# Patient Record
Sex: FEMALE | Race: WHITE | Employment: FULL TIME | URBAN - NONMETROPOLITAN AREA
[De-identification: names, ages, dates, MRNs, and addresses within clinical notes are randomized per-mention and may not be internally consistent; named-entity substitution may affect disease eponyms.]

---

## 2017-01-20 ENCOUNTER — OFFICE VISIT (OUTPATIENT)
Dept: FAMILY MEDICINE CLINIC | Age: 52
End: 2017-01-20

## 2017-01-20 VITALS
SYSTOLIC BLOOD PRESSURE: 128 MMHG | WEIGHT: 185 LBS | HEIGHT: 66 IN | DIASTOLIC BLOOD PRESSURE: 74 MMHG | BODY MASS INDEX: 29.73 KG/M2 | HEART RATE: 72 BPM

## 2017-01-20 DIAGNOSIS — K21.9 GASTROESOPHAGEAL REFLUX DISEASE WITHOUT ESOPHAGITIS: ICD-10-CM

## 2017-01-20 DIAGNOSIS — M05.9 RHEUMATOID ARTHRITIS WITH POSITIVE RHEUMATOID FACTOR, INVOLVING UNSPECIFIED SITE (HCC): ICD-10-CM

## 2017-01-20 DIAGNOSIS — E78.5 HYPERLIPIDEMIA, UNSPECIFIED HYPERLIPIDEMIA TYPE: ICD-10-CM

## 2017-01-20 DIAGNOSIS — Z72.0 TOBACCO ABUSE: Primary | ICD-10-CM

## 2017-01-20 DIAGNOSIS — R60.1 GENERALIZED EDEMA: ICD-10-CM

## 2017-01-20 PROCEDURE — 99214 OFFICE O/P EST MOD 30 MIN: CPT | Performed by: FAMILY MEDICINE

## 2017-01-20 RX ORDER — ATORVASTATIN CALCIUM 20 MG/1
TABLET, FILM COATED ORAL
Qty: 90 TABLET | Refills: 1 | Status: SHIPPED | OUTPATIENT
Start: 2017-01-20 | End: 2017-05-26 | Stop reason: SDUPTHER

## 2017-01-20 RX ORDER — FOLIC ACID 1 MG/1
1 TABLET ORAL DAILY
COMMUNITY
End: 2022-08-11

## 2017-01-20 RX ORDER — PAROXETINE HYDROCHLORIDE 20 MG/1
20 TABLET, FILM COATED ORAL DAILY
Qty: 30 TABLET | Refills: 3 | Status: SHIPPED | OUTPATIENT
Start: 2017-01-20 | End: 2017-05-26 | Stop reason: SDUPTHER

## 2017-01-20 RX ORDER — ESOMEPRAZOLE MAGNESIUM 40 MG/1
40 CAPSULE, DELAYED RELEASE ORAL
Qty: 90 CAPSULE | Refills: 1 | Status: SHIPPED | OUTPATIENT
Start: 2017-01-20 | End: 2017-05-26 | Stop reason: SDUPTHER

## 2017-01-20 RX ORDER — FUROSEMIDE 20 MG/1
20 TABLET ORAL DAILY
Qty: 20 TABLET | Refills: 3 | Status: SHIPPED | OUTPATIENT
Start: 2017-01-20 | End: 2017-05-26 | Stop reason: SDUPTHER

## 2017-01-20 ASSESSMENT — ENCOUNTER SYMPTOMS
EYES NEGATIVE: 1
RESPIRATORY NEGATIVE: 1
GASTROINTESTINAL NEGATIVE: 1
ALLERGIC/IMMUNOLOGIC NEGATIVE: 1

## 2017-04-30 ENCOUNTER — OFFICE VISIT (OUTPATIENT)
Dept: PRIMARY CARE CLINIC | Age: 52
End: 2017-04-30
Payer: COMMERCIAL

## 2017-04-30 VITALS
HEART RATE: 74 BPM | OXYGEN SATURATION: 98 % | WEIGHT: 178 LBS | TEMPERATURE: 98.3 F | HEIGHT: 64 IN | SYSTOLIC BLOOD PRESSURE: 126 MMHG | BODY MASS INDEX: 30.39 KG/M2 | DIASTOLIC BLOOD PRESSURE: 70 MMHG

## 2017-04-30 DIAGNOSIS — J40 BRONCHITIS: Primary | ICD-10-CM

## 2017-04-30 PROCEDURE — 99213 OFFICE O/P EST LOW 20 MIN: CPT | Performed by: FAMILY MEDICINE

## 2017-04-30 RX ORDER — PREDNISONE 20 MG/1
40 TABLET ORAL DAILY
Qty: 10 TABLET | Refills: 0 | Status: SHIPPED | OUTPATIENT
Start: 2017-04-30 | End: 2017-05-05

## 2017-04-30 RX ORDER — AZITHROMYCIN 250 MG/1
TABLET, FILM COATED ORAL
Qty: 1 PACKET | Refills: 1 | Status: SHIPPED | OUTPATIENT
Start: 2017-04-30 | End: 2017-08-10 | Stop reason: ALTCHOICE

## 2017-04-30 RX ORDER — ALBUTEROL SULFATE 90 UG/1
2 AEROSOL, METERED RESPIRATORY (INHALATION) EVERY 4 HOURS PRN
Qty: 1 INHALER | Refills: 0 | Status: SHIPPED | OUTPATIENT
Start: 2017-04-30 | End: 2019-07-05 | Stop reason: SDUPTHER

## 2017-04-30 ASSESSMENT — ENCOUNTER SYMPTOMS
SINUS PRESSURE: 1
CONSTIPATION: 0
NAUSEA: 0
SORE THROAT: 0
WHEEZING: 1
SHORTNESS OF BREATH: 1
DIARRHEA: 0
CHOKING: 0
CHEST TIGHTNESS: 0
COUGH: 1
TROUBLE SWALLOWING: 0

## 2017-05-26 DIAGNOSIS — R60.1 GENERALIZED EDEMA: ICD-10-CM

## 2017-05-26 DIAGNOSIS — E78.5 HYPERLIPIDEMIA, UNSPECIFIED HYPERLIPIDEMIA TYPE: ICD-10-CM

## 2017-05-26 RX ORDER — FUROSEMIDE 20 MG/1
20 TABLET ORAL DAILY PRN
Qty: 90 TABLET | Refills: 0 | Status: SHIPPED | OUTPATIENT
Start: 2017-05-26 | End: 2017-08-10 | Stop reason: SDUPTHER

## 2017-05-26 RX ORDER — ATORVASTATIN CALCIUM 20 MG/1
20 TABLET, FILM COATED ORAL DAILY
Qty: 90 TABLET | Refills: 0 | Status: SHIPPED | OUTPATIENT
Start: 2017-05-26 | End: 2017-08-10 | Stop reason: SDUPTHER

## 2017-05-26 RX ORDER — ESOMEPRAZOLE MAGNESIUM 40 MG/1
40 CAPSULE, DELAYED RELEASE ORAL
Qty: 90 CAPSULE | Refills: 0 | Status: SHIPPED | OUTPATIENT
Start: 2017-05-26 | End: 2017-08-10 | Stop reason: SDUPTHER

## 2017-05-26 RX ORDER — PAROXETINE HYDROCHLORIDE 20 MG/1
20 TABLET, FILM COATED ORAL DAILY
Qty: 90 TABLET | Refills: 0 | Status: SHIPPED | OUTPATIENT
Start: 2017-05-26 | End: 2017-08-10 | Stop reason: SDUPTHER

## 2017-08-10 ENCOUNTER — OFFICE VISIT (OUTPATIENT)
Dept: FAMILY MEDICINE CLINIC | Age: 52
End: 2017-08-10
Payer: COMMERCIAL

## 2017-08-10 VITALS
BODY MASS INDEX: 30.73 KG/M2 | DIASTOLIC BLOOD PRESSURE: 72 MMHG | HEART RATE: 72 BPM | WEIGHT: 180 LBS | HEIGHT: 64 IN | SYSTOLIC BLOOD PRESSURE: 130 MMHG

## 2017-08-10 DIAGNOSIS — Z72.0 TOBACCO ABUSE: ICD-10-CM

## 2017-08-10 DIAGNOSIS — E78.5 HYPERLIPIDEMIA, UNSPECIFIED HYPERLIPIDEMIA TYPE: ICD-10-CM

## 2017-08-10 DIAGNOSIS — F32.9 REACTIVE DEPRESSION: ICD-10-CM

## 2017-08-10 DIAGNOSIS — J31.2 CHRONIC SORE THROAT: ICD-10-CM

## 2017-08-10 DIAGNOSIS — Z12.11 SCREENING FOR COLON CANCER: ICD-10-CM

## 2017-08-10 DIAGNOSIS — Z12.31 VISIT FOR SCREENING MAMMOGRAM: Primary | ICD-10-CM

## 2017-08-10 DIAGNOSIS — Z01.419 VISIT FOR GYNECOLOGIC EXAMINATION: ICD-10-CM

## 2017-08-10 DIAGNOSIS — G47.8 UNREFRESHED BY SLEEP: ICD-10-CM

## 2017-08-10 DIAGNOSIS — R60.1 GENERALIZED EDEMA: ICD-10-CM

## 2017-08-10 DIAGNOSIS — R06.83 SNORING: ICD-10-CM

## 2017-08-10 DIAGNOSIS — M05.9 RHEUMATOID ARTHRITIS WITH POSITIVE RHEUMATOID FACTOR, INVOLVING UNSPECIFIED SITE (HCC): ICD-10-CM

## 2017-08-10 DIAGNOSIS — K21.9 GASTROESOPHAGEAL REFLUX DISEASE WITHOUT ESOPHAGITIS: ICD-10-CM

## 2017-08-10 PROCEDURE — 87081 CULTURE SCREEN ONLY: CPT | Performed by: FAMILY MEDICINE

## 2017-08-10 PROCEDURE — 99214 OFFICE O/P EST MOD 30 MIN: CPT | Performed by: FAMILY MEDICINE

## 2017-08-10 RX ORDER — ATORVASTATIN CALCIUM 20 MG/1
20 TABLET, FILM COATED ORAL DAILY
Qty: 90 TABLET | Refills: 3 | Status: SHIPPED | OUTPATIENT
Start: 2017-08-10 | End: 2017-08-14 | Stop reason: SDUPTHER

## 2017-08-10 RX ORDER — PAROXETINE HYDROCHLORIDE 40 MG/1
40 TABLET, FILM COATED ORAL EVERY MORNING
Qty: 90 TABLET | Refills: 3 | Status: SHIPPED | OUTPATIENT
Start: 2017-08-10 | End: 2017-08-14 | Stop reason: SDUPTHER

## 2017-08-10 RX ORDER — ESOMEPRAZOLE MAGNESIUM 40 MG/1
40 CAPSULE, DELAYED RELEASE ORAL
Qty: 90 CAPSULE | Refills: 3 | Status: SHIPPED | OUTPATIENT
Start: 2017-08-10 | End: 2017-08-17 | Stop reason: SDUPTHER

## 2017-08-10 RX ORDER — PAROXETINE HYDROCHLORIDE 20 MG/1
TABLET, FILM COATED ORAL
Qty: 90 TABLET | Refills: 1 | Status: SHIPPED | OUTPATIENT
Start: 2017-08-10 | End: 2017-08-10 | Stop reason: SDUPTHER

## 2017-08-10 RX ORDER — FUROSEMIDE 20 MG/1
TABLET ORAL
Qty: 90 TABLET | Refills: 1 | Status: SHIPPED | OUTPATIENT
Start: 2017-08-10 | End: 2017-08-14 | Stop reason: SDUPTHER

## 2017-08-10 ASSESSMENT — ENCOUNTER SYMPTOMS
GASTROINTESTINAL NEGATIVE: 1
SORE THROAT: 1
EYES NEGATIVE: 1
RESPIRATORY NEGATIVE: 1

## 2017-08-10 ASSESSMENT — PATIENT HEALTH QUESTIONNAIRE - PHQ9
SUM OF ALL RESPONSES TO PHQ QUESTIONS 1-9: 0
1. LITTLE INTEREST OR PLEASURE IN DOING THINGS: 0
2. FEELING DOWN, DEPRESSED OR HOPELESS: 0
SUM OF ALL RESPONSES TO PHQ9 QUESTIONS 1 & 2: 0

## 2017-08-13 LAB
CULTURE: NORMAL
Lab: NORMAL
SPECIMEN DESCRIPTION: NORMAL
SPECIMEN DESCRIPTION: NORMAL
STATUS: NORMAL

## 2017-08-14 DIAGNOSIS — R60.1 GENERALIZED EDEMA: ICD-10-CM

## 2017-08-14 DIAGNOSIS — E78.5 HYPERLIPIDEMIA, UNSPECIFIED HYPERLIPIDEMIA TYPE: ICD-10-CM

## 2017-08-17 RX ORDER — PAROXETINE HYDROCHLORIDE 40 MG/1
40 TABLET, FILM COATED ORAL EVERY MORNING
Qty: 90 TABLET | Refills: 1 | Status: SHIPPED | OUTPATIENT
Start: 2017-08-17 | End: 2018-02-28 | Stop reason: SDUPTHER

## 2017-08-17 RX ORDER — ATORVASTATIN CALCIUM 20 MG/1
20 TABLET, FILM COATED ORAL DAILY
Qty: 90 TABLET | Refills: 1 | Status: SHIPPED | OUTPATIENT
Start: 2017-08-17 | End: 2017-09-27 | Stop reason: SDUPTHER

## 2017-08-17 RX ORDER — FUROSEMIDE 20 MG/1
TABLET ORAL
Qty: 90 TABLET | Refills: 1 | Status: SHIPPED | OUTPATIENT
Start: 2017-08-17 | End: 2018-02-01 | Stop reason: SDUPTHER

## 2017-08-17 RX ORDER — ESOMEPRAZOLE MAGNESIUM 40 MG/1
40 CAPSULE, DELAYED RELEASE ORAL
Qty: 90 CAPSULE | Refills: 1 | Status: SHIPPED | OUTPATIENT
Start: 2017-08-17 | End: 2017-10-17 | Stop reason: SDUPTHER

## 2017-08-21 DIAGNOSIS — J31.2 CHRONIC SORE THROAT: Primary | ICD-10-CM

## 2017-09-13 ENCOUNTER — TELEPHONE (OUTPATIENT)
Dept: FAMILY MEDICINE CLINIC | Age: 52
End: 2017-09-13

## 2017-09-27 DIAGNOSIS — E78.5 HYPERLIPIDEMIA, UNSPECIFIED HYPERLIPIDEMIA TYPE: ICD-10-CM

## 2017-09-27 RX ORDER — ATORVASTATIN CALCIUM 20 MG/1
TABLET, FILM COATED ORAL
Qty: 90 TABLET | Refills: 3 | Status: SHIPPED | OUTPATIENT
Start: 2017-09-27 | End: 2018-09-27 | Stop reason: SDUPTHER

## 2017-10-17 RX ORDER — ESOMEPRAZOLE MAGNESIUM 40 MG/1
CAPSULE, DELAYED RELEASE ORAL
Qty: 90 CAPSULE | Refills: 1 | Status: SHIPPED | OUTPATIENT
Start: 2017-10-17 | End: 2018-09-27 | Stop reason: SDUPTHER

## 2017-10-25 ENCOUNTER — OFFICE VISIT (OUTPATIENT)
Dept: PRIMARY CARE CLINIC | Age: 52
End: 2017-10-25
Payer: COMMERCIAL

## 2017-10-25 ENCOUNTER — OFFICE VISIT (OUTPATIENT)
Dept: OBGYN | Age: 52
End: 2017-10-25

## 2017-10-25 VITALS
DIASTOLIC BLOOD PRESSURE: 74 MMHG | BODY MASS INDEX: 31.24 KG/M2 | OXYGEN SATURATION: 99 % | HEART RATE: 71 BPM | SYSTOLIC BLOOD PRESSURE: 136 MMHG | WEIGHT: 183 LBS

## 2017-10-25 DIAGNOSIS — Z01.419 WELL FEMALE EXAM WITH ROUTINE GYNECOLOGICAL EXAM: Primary | ICD-10-CM

## 2017-10-25 DIAGNOSIS — B37.0 ORAL CANDIDIASIS: Primary | ICD-10-CM

## 2017-10-25 PROCEDURE — 99202 OFFICE O/P NEW SF 15 MIN: CPT | Performed by: NURSE PRACTITIONER

## 2017-10-25 RX ORDER — NAPROXEN 500 MG/1
TABLET ORAL
COMMUNITY
Start: 2016-10-27 | End: 2022-05-26 | Stop reason: SDUPTHER

## 2017-10-25 ASSESSMENT — ENCOUNTER SYMPTOMS
COLOR CHANGE: 1
RESPIRATORY NEGATIVE: 1
SWOLLEN GLANDS: 0

## 2017-10-25 NOTE — PROGRESS NOTES
Subjective:      Patient ID: Bill Callahan is a 50 y.o. female. Other   This is a new problem. The current episode started in the past 7 days. The problem occurs constantly. The problem has been unchanged. Pertinent negatives include no fever or swollen glands. Treatments tried: Listerine; tooth brush. The treatment provided no relief. Review of Systems   Constitutional: Negative for appetite change and fever. Respiratory: Negative. Cardiovascular: Negative. Musculoskeletal: Negative. Skin: Positive for color change. Neurological: Negative. Objective:   Physical Exam   Constitutional: She is oriented to person, place, and time. She appears well-developed. HENT:   Head: Normocephalic and atraumatic. Right Ear: Tympanic membrane, external ear and ear canal normal.   Left Ear: Tympanic membrane, external ear and ear canal normal.   Nose: Nose normal.   Mouth/Throat: Uvula is midline and mucous membranes are normal. Oropharyngeal exudate present. Eyes: Conjunctivae, EOM and lids are normal. Pupils are equal, round, and reactive to light. Neck: Trachea normal and normal range of motion. Cardiovascular: Normal rate, regular rhythm, normal heart sounds and normal pulses. Pulmonary/Chest: Effort normal and breath sounds normal.   Musculoskeletal: Normal range of motion. Neurological: She is alert and oriented to person, place, and time. Skin: Skin is warm, dry and intact. Vitals:    10/25/17 1545   BP: 136/74   Pulse: 71   SpO2: 99%     I have reviewed pertinent family and social history. Assessment:      1. Oral candidiasis  nystatin (MYCOSTATIN) 082477 UNIT/ML suspension           Plan:      Patient is on Humira. Discussed that Oral yeast infection could likely be caused from Humira. Use Nystatin oral suspension as directed. Recommend yogurt and/or probiotic. Return to ER or UC for symptoms which worsen or fail to improve.   Follow up or establish with PCP for

## 2017-10-25 NOTE — PATIENT INSTRUCTIONS
Patient Education        Candidiasis: Care Instructions  Your Care Instructions  Candidiasis (say \"fuj-jyx-PK-uh-asim\") is a yeast infection. Yeast normally lives in your body. But it can cause problems if your body's defenses don't work as they should. Some medicines can increase your chance of getting a yeast infection. These include antibiotics, steroids, and cancer drugs. And some diseases like AIDS and diabetes can make you more likely to get yeast infections. There are different types of yeast infections. Sarmad Catherine is a yeast infection in the mouth. It usually occurs in people with weak immune systems. It causes white patches inside the mouth and throat. Yeast infections of the skin usually occur in skin folds where the skin stays moist. They cause red, oozing patches on your skin. Babies can get these infections under the diaper. People who often wear gloves can get them on their hands. Many women get vaginal yeast infections. They are most common when women take antibiotics. These infections can cause the vagina to itch and burn. They also cause white discharge that looks like cottage cheese. In rare cases, yeast infects the blood. This can cause serious disease. This kind of infection is treated with medicine given through a needle into a vein (IV). After you start treatment, a yeast infection usually goes away quickly. But if your immune system is weak, the infection may come back. Tell your doctor if you get yeast infections often. Follow-up care is a key part of your treatment and safety. Be sure to make and go to all appointments, and call your doctor if you are having problems. It's also a good idea to know your test results and keep a list of the medicines you take. How can you care for yourself at home? · Take your medicines exactly as prescribed. Call your doctor if you think you are having a problem with your medicine. · Use antibiotics only as directed by your doctor.   · Eat yogurt with live cultures. It has bacteria called lactobacillus. It may help prevent some types of yeast infections. · Keep your skin clean and dry. Put powder on moist places. · If you are using a cream or suppository to treat a vaginal yeast infection, don't use condoms or a diaphragm. Use a different type of birth control. · Eat a healthy diet and get regular exercise. This will help keep your immune system strong. When should you call for help? Call your doctor now or seek immediate medical care if:  · You have a fever. · You are pregnant and have signs of a vaginal or urinary tract infection such as:  ¨ Severe itching in your vagina. ¨ Pain during sex or when you urinate. ¨ Unusual discharge from your vagina. ¨ A frequent urge to urinate. ¨ Urine that is cloudy or smells bad. Watch closely for changes in your health, and be sure to contact your doctor if:  · You do not get better as expected. Where can you learn more? Go to https://MiniLuxe.Storypanda. org and sign in to your Hashtago account. Enter H969 in the eDreams Edusoft box to learn more about \"Candidiasis: Care Instructions. \"     If you do not have an account, please click on the \"Sign Up Now\" link. Current as of: October 13, 2016  Content Version: 11.3  © 3719-2316 [employer]. Care instructions adapted under license by [facility] ([facility]). If you have questions about a medical condition or this instruction, always ask your healthcare professional. [place] [address] any warranty or liability for your use of this information.

## 2017-11-09 ENCOUNTER — HOSPITAL ENCOUNTER (OUTPATIENT)
Age: 52
Setting detail: SPECIMEN
Discharge: HOME | End: 2017-11-09
Payer: COMMERCIAL

## 2017-11-09 ENCOUNTER — OFFICE VISIT (OUTPATIENT)
Dept: OBGYN | Age: 52
End: 2017-11-09
Payer: COMMERCIAL

## 2017-11-09 VITALS
DIASTOLIC BLOOD PRESSURE: 80 MMHG | SYSTOLIC BLOOD PRESSURE: 124 MMHG | HEART RATE: 78 BPM | WEIGHT: 188.2 LBS | HEIGHT: 65 IN | BODY MASS INDEX: 31.36 KG/M2

## 2017-11-09 DIAGNOSIS — Z01.419 WELL FEMALE EXAM WITH ROUTINE GYNECOLOGICAL EXAM: Primary | ICD-10-CM

## 2017-11-09 DIAGNOSIS — Z01.419 WELL FEMALE EXAM WITH ROUTINE GYNECOLOGICAL EXAM: ICD-10-CM

## 2017-11-09 DIAGNOSIS — Z12.31 SCREENING MAMMOGRAM, ENCOUNTER FOR: ICD-10-CM

## 2017-11-09 PROCEDURE — G0145 SCR C/V CYTO,THINLAYER,RESCR: HCPCS

## 2017-11-09 PROCEDURE — 99396 PREV VISIT EST AGE 40-64: CPT | Performed by: NURSE PRACTITIONER

## 2017-11-09 NOTE — PROGRESS NOTES
Mana Rojasbarbara  2017              50 y.o. Chief Complaint   Patient presents with    Gynecologic Exam     annual exam and pap         Patient's last menstrual period was 10/24/2017. No referring provider defined for this encounter. HPI :Annual Exam  Patient presents for annual exam.  Counseling on healthy lifestyle reviewed, as well as the need for self breast exam. We reviewed the need for Kegal exercises . We discussed and reviewed the need for routine screenings and immunization updates when appropriate. Menses every 2-3 months lasting 7 days. Heavy 5 days requiring change of tampon every 3-4 hours. Performs monthly self breast exams. There are no questions or concerns of a gynecologic nature. The patient is sexually active. last pap: was normal, last mammogram: was normal  The patient has regular exercise: yes .  We did review the need for and frequency of both weight bearing and strengthening as tolerated by the patient. ________________________________________________________________________  Obstetric History       T3      L6     SAB0   TAB0   Ectopic0   Molar0   Multiple0   Live Births3       # Outcome Date GA Lbr Roberto Carlos/2nd Weight Sex Delivery Anes PTL Lv   3 Term      Vag-Spont   BRANDT   2 Term      Vag-Spont   BRANDT   1 Term      Vag-Spont   BRANDT        Past Medical History:   Diagnosis Date    Fibroid uterus     removed with laparoscopy     GERD (gastroesophageal reflux disease)     Hyperlipidemia     Rheumatoid arthritis (HCC)     Tobacco abuse                                                                    Past Surgical History:   Procedure Laterality Date    CHOLECYSTECTOMY      LAPAROSCOPY      exploratory\     Family History   Problem Relation Age of Onset    Cancer Mother      breast , tumor in bowels    Heart Disease Father      Social History     Social History    Marital status:      Spouse name: N/A    Number of children: N/A    Years 2014    ________________________________________________________________________  REVIEW OF SYSTEMS:     A minimum of an eleven point review of systems was completed. Review Of Systems (11 point):  General ROS:  negative  Hematological and Lymphatic ROS:negative   Breast ROS: negative  Cardiovascular ROS: negative  Respiratory ROS: negative   Gastrointestinal ROS: negative  Genito-Urinary ROS: negative  Psychological ROS: negative  Neurological ROS: negative  Musculoskeletal ROS: negative  Dermatological ROS: negative                                                                                                                                                                                   PHYSICAL Exam:     Constitutional:  Blood pressure 124/80, pulse 78, height 5' 4.5\" (1.638 m), weight 188 lb 3.2 oz (85.4 kg), last menstrual period 10/24/2017, not currently breastfeeding. General Appearance: This  is a well Developed, well Nourished, well groomed female. Her BMI was reviewed. Skin:  There was a Normal Inspection of the skin without rashes or lesions. There were no rashes. (Papular, Maculopapular, Hives, Pustular, Macular)     There were no lesions (Ulcers, Erythema, Abn. Appearing Nevi)      Lymphatic:  No Lymph Nodes were Palpable in the neck , axilla or groin. Neck and EENT:  The neck was supple. There were no masses   The thyroid was not enlarged and had no masses. PERRLA, Nares Patent No Masses    Respiratory: The lungs were auscultated and found to be clear. There were no rales, rhonchi or wheezes. There was a good respiratory effort. Cardiovascular: The heart was in a regular rate and rhythm. . No S3 or S4. There was no murmur appreciated. Extremities: The patients extremities were without calf tenderness, edema, or varicosities. There was full range of motion in all four extremities. Pulses in all four extremities    Abdomen: The abdomen was soft and non-tender.  There were good bowel sounds in all quadrants and there was no guarding, rebound or rigidity. On evaluation there was no evidence of hepatosplenomegaly and there was no costal vertebral robel tenderness bilaterally. No hernias were appreciated. Psych: The patient had a normal Orientation to: Time, Place, Person, and Situation  Mood and affect appropriate    Breast:  (Chest)  normal appearance, no masses or tenderness, Inspection negative, No nipple retraction or dimpling, No nipple discharge or bleeding, No axillary or supraclavicular adenopathy, Normal to palpation without dominant masses, negative findings: normal in size and symmetry, normal contour with no evidence of flattening or dimpling, skin normal, nipples everted without rashes or discharge, palpation negative for masses or nodules, no palpable axillary lymphadenopathy, positive findings: fibrocystic changes, bilateral, upper aspect of breasts, right greater than left      Pelvic Exam:  External genitalia: normal general appearance  Urinary system: urethral meatus normal  Vaginal: normal mucosa without prolapse or lesions, normal without tenderness, induration or masses and normal rugae  Cervix: normal appearance and thin prep PAP obtained  Adnexa: normal bimanual exam and non palpable  Uterus: normal single, nontender and mid-position    Musculosk:  Normal Gait and station was noted. Digits were evaluated without abnormal findings. Range of motion, stability and strength were evaluated and found to be appropriate for the patients age. ASSESSMENT:      50 y.o. Annual  1. Well female exam with routine gynecological exam  PAP SMEAR   2.  Screening mammogram, encounter for  [facility] DIGITAL SCREEN W CAD BILATERAL        Chief Complaint   Patient presents with    Gynecologic Exam     annual exam and pap         Past Medical History:   Diagnosis Date    Fibroid uterus     removed with laparoscopy     GERD (gastroesophageal reflux disease)     Hyperlipidemia     [facility]oid [facility] ([facility] [address]     Tobacco abuse          Patient Active Problem List   Diagnosis    Gastroesophageal reflux disease without esophagitis    Tobacco abuse    Hyperlipidemia    Rheumatoid arthritis ([facility] [address]          Hereditary Breast, Ovarian, Colon and Uterine Cancer screening Done. Tobacco & Secondary smoke risks reviewed; instructed on cessation and avoidance    PLAN:  No Follow-up on file. Repeat pap per ASCCP 2013 guidelines  Return for annual exams  Mammograms every 1 year. If 39 yo and last mammogram was negative. Routine health maintenance per patients PCP. No orders of the defined types were placed in this encounter.       Bill Erickson  11/9/2017      (Please note that portions of this note were completed with a voice-recognition program. Efforts were made to edit the dictations but occasionally words are mis-transcribed.)

## 2017-11-17 LAB — CYTOLOGY REPORT: NORMAL

## 2017-11-28 ENCOUNTER — OFFICE VISIT (OUTPATIENT)
Dept: PRIMARY CARE CLINIC | Age: 52
End: 2017-11-28
Payer: COMMERCIAL

## 2017-11-28 VITALS
OXYGEN SATURATION: 98 % | RESPIRATION RATE: 14 BRPM | DIASTOLIC BLOOD PRESSURE: 70 MMHG | HEART RATE: 71 BPM | WEIGHT: 184.2 LBS | BODY MASS INDEX: 28.91 KG/M2 | SYSTOLIC BLOOD PRESSURE: 118 MMHG | HEIGHT: 67 IN | TEMPERATURE: 97.7 F

## 2017-11-28 DIAGNOSIS — J06.9 UPPER RESPIRATORY TRACT INFECTION, UNSPECIFIED TYPE: ICD-10-CM

## 2017-11-28 DIAGNOSIS — B37.0 ORAL CANDIDIASIS: ICD-10-CM

## 2017-11-28 DIAGNOSIS — J04.0 LARYNGITIS: Primary | ICD-10-CM

## 2017-11-28 LAB — S PYO AG THROAT QL: NORMAL

## 2017-11-28 PROCEDURE — 87880 STREP A ASSAY W/OPTIC: CPT | Performed by: PHYSICIAN ASSISTANT

## 2017-11-28 PROCEDURE — 99213 OFFICE O/P EST LOW 20 MIN: CPT | Performed by: PHYSICIAN ASSISTANT

## 2017-11-28 RX ORDER — CETIRIZINE HYDROCHLORIDE 10 MG/1
10 TABLET ORAL DAILY
Qty: 14 TABLET | Refills: 0 | Status: SHIPPED | OUTPATIENT
Start: 2017-11-28 | End: 2018-03-28 | Stop reason: ALTCHOICE

## 2017-11-28 RX ORDER — TIZANIDINE 2 MG/1
TABLET ORAL
COMMUNITY
Start: 2017-11-21 | End: 2021-10-07 | Stop reason: ALTCHOICE

## 2017-11-28 RX ORDER — DEXTROMETHORPHAN POLISTIREX 30 MG/5ML
60 SUSPENSION ORAL 2 TIMES DAILY PRN
COMMUNITY
End: 2018-02-06 | Stop reason: ALTCHOICE

## 2017-11-28 ASSESSMENT — ENCOUNTER SYMPTOMS
SWOLLEN GLANDS: 0
COUGH: 1
SORE THROAT: 1

## 2017-11-29 NOTE — PROGRESS NOTES
Subjective:      Patient ID: Bill Callahan is a 50 y.o. female. Pharyngitis   This is a new problem. The current episode started in the past 7 days. The problem has been gradually worsening. Associated symptoms include coughing and a sore throat. Pertinent negatives include no congestion, fatigue, fever, headaches, myalgias or swollen glands. Treatments tried: Delsym. The treatment provided mild relief. Review of Systems   Constitutional: Negative for fatigue and fever. HENT: Positive for sore throat. Negative for congestion. Respiratory: Positive for cough. Musculoskeletal: Negative for myalgias. Neurological: Negative for headaches. Objective:   Physical Exam   Constitutional: She is oriented to person, place, and time. She appears well-developed. HENT:   Head: Normocephalic. Right Ear: External ear normal.   Left Ear: External ear normal.   Mouth/Throat: No oropharyngeal exudate or posterior oropharyngeal edema. Eyes: Pupils are equal, round, and reactive to light. Neck: Neck supple. Cardiovascular: Normal rate and regular rhythm. Pulmonary/Chest: Effort normal and breath sounds normal.   Neurological: She is alert and oriented to person, place, and time. Office Visit on 11/28/2017   Component Date Value Ref Range Status    Strep A Ag 11/28/2017 None Detected  None Detected Final       Assessment:      1. Laryngitis    2. Upper respiratory tract infection, unspecified type    3. Oral candidiasis          Plan:      Viral etiology discussed. Recommend symptomatic care. Fluids/Rest.  Tylenol. Cetirizine. Salt water gargles. Nystatin suspension. Follow-up with PCP.

## 2017-11-29 NOTE — PATIENT INSTRUCTIONS
clear your throat. This can cause more irritation of your larynx. Take an over-the-counter cough suppressant (if your doctor recommends it) if you have a dry cough that does not produce mucus. · Do not smoke or allow others to smoke around you. If you need help quitting, talk to your doctor about stop-smoking programs and medicines. These can increase your chances of quitting for good. · Use a humidifier or vaporizer to add moisture to your bedroom. Humidity helps to thin the mucus in the nasal membranes that causes stuffiness or postnasal drip. Follow the directions for cleaning the machine. · Drink plenty of fluids, enough so that your urine is light yellow or clear like water. If you have kidney, heart, or liver disease and have to limit fluids, talk with your doctor before you increase the amount of fluids you drink. · Use saline (saltwater) nasal washes to help keep your nasal passages open and wash out mucus and bacteria. You can buy saline nose drops at a grocery store or drugstore. Or, you can make your own at home by mixing ½ teaspoon salt, 1 cup water (at room temperature), and ½ teaspoon baking soda. If you make your own, fill a bulb syringe with the solution, insert the tip into your nostril, and squeeze gently. Arlice Corona your nose. When should you call for help? Call 911 anytime you think you may need emergency care. For example, call if:  · You have trouble breathing. Call your doctor now or seek immediate medical care if:  · You have new or worse pain. · You have trouble swallowing. Watch closely for changes in your health, and be sure to contact your doctor if:  · You do not get better as expected. Where can you learn more? Go to https://[email or web address] [email or web address].Tow [email or web address] org and sign in to your GetYou account. Enter Y283 in the Cloud Takeoff box to learn more about \"Laryngitis: Care Instructions. \"     If you do not have an account, please click on the \"Sign Up Now\" link.   Current as of: July 29, 2016  Content Version: 11.3  © 7625-8894 [employer]. Care instructions adapted under license by [facility] ([place] [facility]). If you have questions about a medical condition or this instruction, always ask your healthcare professional. [address] any warranty or liability for your use of this information.

## 2017-12-02 ENCOUNTER — HOSPITAL ENCOUNTER (OUTPATIENT)
Dept: MAMMOGRAPHY | Age: 52
Discharge: HOME | End: 2017-12-02
Payer: COMMERCIAL

## 2017-12-02 DIAGNOSIS — Z12.31 VISIT FOR SCREENING MAMMOGRAM: ICD-10-CM

## 2017-12-14 ENCOUNTER — OFFICE VISIT (OUTPATIENT)
Dept: OTOLARYNGOLOGY | Age: 52
End: 2017-12-14
Payer: COMMERCIAL

## 2017-12-14 VITALS
HEART RATE: 72 BPM | DIASTOLIC BLOOD PRESSURE: 78 MMHG | SYSTOLIC BLOOD PRESSURE: 132 MMHG | WEIGHT: 186 LBS | BODY MASS INDEX: 43.05 KG/M2 | HEIGHT: 55 IN

## 2017-12-14 DIAGNOSIS — R49.0 DYSPHONIA: Primary | ICD-10-CM

## 2017-12-14 DIAGNOSIS — J37.0 CHRONIC LARYNGITIS: ICD-10-CM

## 2017-12-14 DIAGNOSIS — J38.1 VOCAL CORD POLYP: ICD-10-CM

## 2017-12-14 PROCEDURE — 31575 DIAGNOSTIC LARYNGOSCOPY: CPT | Performed by: OTOLARYNGOLOGY

## 2017-12-14 NOTE — PROGRESS NOTES
Wendel Ormond  19    Chief Complaint   Patient presents with   Wiliam Shaper     had previous polyps removed over 20yrs ago       HPI:  hx VC polyps 20 yrs ago, now co hoarse x 1 yr, smokes, no pain, on pred 10 for arthritis     Past Med Hx:     Past Medical History:   Diagnosis Date    Fibroid uterus     removed with laparoscopy     GERD (gastroesophageal reflux disease)     Hyperlipidemia     Rheumatoid arthritis ([address]     Tobacco abuse         Past Surgical History:   Procedure Laterality Date    CHOLECYSTECTOMY      LAPAROSCOPY      exploratory\       Family History:     Family History   Problem Relation Age of Onset    Cancer Mother      breast , tumor in bowels    Heart Disease Father        Social Hx:     Social History     Social History    Marital status:      Spouse name: N/A    Number of children: N/A    Years of education: N/A     Occupational History    Not on file.      Social History Main Topics    Smoking status: Current Every Day Smoker     Packs/day: 1.00     Years: 30.00     Types: Cigarettes    Smokeless tobacco: Never Used    Alcohol use 0.0 oz/week      Comment: social    Drug use: No    Sexual activity: Not on file     Other Topics Concern    Not on file     Social History Narrative    No narrative on file       ROS:   CV: n   Endocrine:    Resp:     GI:  gerd     Neuro:   PE:     General appearance:  Normal                 Ability to Communicate:  Normal       Head & Face:  Normal   Salivary Glands:  Normal              Facial Strength:  Normal   Ears:    Pinna:  Normal            EAC:  Normal      TMs:  Normal       [place]:  Escalona Rinne     Finger Rub     Nose:    External: Normal    Septum:  Normal    Turbinates: Normal             Nasal Cavity: Normal         Naso Pharyngoscopy:     Nasal Endoscopy:      Oral Cavity & Oral Pharynx:    Tongue:  Normal    Teeth & Gums:             Palate:  Mckayla     Tonsils:      FOM:  Normal     Other: Procedure: FIBEROPTIC DIRECT LARYNGOSCOPY    Pre-op dx: dysphonia     Post - op dx: Same    Indications: same    Anesthesia: Topical spray of oxymetazoline & 4% lidocaine mixed 1:1    Description of procedure: Each nostril was sprayed with an oxymetazoline & 4% lidocaine mixture. After approximately 5 minutes a [employer] 4mm fiberoptic nasopharyngoscope was passed through the  nostril, through the naso pharynx to the hypopharynx. The base of tongue, epiglottis, aryepglotic folds, arytenoids, intra-arytenoid space, false and true vocal cords, and pyriform sinuses were visualized. Vocal cord mobility was also assessed. Findings:  Base of tongue: Normal    Epiglottis: Normal    Aryepiglottic folds: Normal    Arytenoids: Normal    False vocal cords: Normal    True vocal cords: Adam VC thickening, w early polyp formation    Pyriform sinuses: Normal    Vocal cord mobility: Normal    Intra-aytenoid space: Normal    Hypopharynx : Normal   Neck:    Thyroid:Normal       Lymph nodes: Normal           Trachea:  Normal      Masses:  Normal    Other:        Eyes:    EOMs:      Nystagmus:      Neurological:    CN V:      CN VII:       Gait & Station:      Romberg:      Tandem Gait:      Halpikes:       Oriented x 3: Normal     Affect:  Normal    Data reviewed:      ASSESSMENT:  1. Dysphonia  WI LARYNGOSCOPY FLEXIBLE DIAGNOSTIC   2. Vocal cord polyp     3. Chronic laryngitis         PLAN:cont pred & omep, stop smoking, asmanex inhaler, see 4wks  Return in about 4 weeks (around 1/11/2018).     Orders Placed This Encounter   Medications    mometasone (ASMANEX 30 METERED DOSES) 220 MCG/INH inhaler     Sig: Inhale 2 puffs into the lungs daily     Dispense:  1 Inhaler     Refill:  3         Erik Walker MD

## 2018-02-01 DIAGNOSIS — R60.1 GENERALIZED EDEMA: ICD-10-CM

## 2018-02-01 RX ORDER — ESOMEPRAZOLE MAGNESIUM 40 MG/1
CAPSULE, DELAYED RELEASE ORAL
Qty: 90 CAPSULE | Refills: 0 | Status: SHIPPED | OUTPATIENT
Start: 2018-02-01 | End: 2018-02-06 | Stop reason: SDUPTHER

## 2018-02-01 RX ORDER — FUROSEMIDE 20 MG/1
TABLET ORAL
Qty: 90 TABLET | Refills: 0 | Status: SHIPPED | OUTPATIENT
Start: 2018-02-01 | End: 2018-05-03 | Stop reason: SDUPTHER

## 2018-02-06 ENCOUNTER — TELEPHONE (OUTPATIENT)
Dept: FAMILY MEDICINE CLINIC | Age: 53
End: 2018-02-06

## 2018-02-06 ENCOUNTER — OFFICE VISIT (OUTPATIENT)
Dept: PRIMARY CARE CLINIC | Age: 53
End: 2018-02-06
Payer: COMMERCIAL

## 2018-02-06 VITALS
SYSTOLIC BLOOD PRESSURE: 130 MMHG | WEIGHT: 194.8 LBS | DIASTOLIC BLOOD PRESSURE: 80 MMHG | TEMPERATURE: 98 F | HEART RATE: 80 BPM | BODY MASS INDEX: 31.31 KG/M2 | HEIGHT: 66 IN

## 2018-02-06 DIAGNOSIS — J01.00 ACUTE NON-RECURRENT MAXILLARY SINUSITIS: Primary | ICD-10-CM

## 2018-02-06 PROCEDURE — 99213 OFFICE O/P EST LOW 20 MIN: CPT | Performed by: FAMILY MEDICINE

## 2018-02-06 RX ORDER — AMOXICILLIN AND CLAVULANATE POTASSIUM 875; 125 MG/1; MG/1
1 TABLET, FILM COATED ORAL 2 TIMES DAILY
Qty: 20 TABLET | Refills: 0 | Status: SHIPPED | OUTPATIENT
Start: 2018-02-06 | End: 2019-02-12 | Stop reason: SDUPTHER

## 2018-02-06 RX ORDER — BENZONATATE 100 MG/1
100 CAPSULE ORAL EVERY 8 HOURS PRN
Qty: 30 CAPSULE | Refills: 0 | Status: SHIPPED | OUTPATIENT
Start: 2018-02-06 | End: 2018-02-15 | Stop reason: SDUPTHER

## 2018-02-06 RX ORDER — DEXTROMETHORPHAN HYDROBROMIDE AND PROMETHAZINE HYDROCHLORIDE 15; 6.25 MG/5ML; MG/5ML
5 SYRUP ORAL NIGHTLY PRN
Qty: 50 ML | Refills: 0 | Status: SHIPPED | OUTPATIENT
Start: 2018-02-06 | End: 2018-02-15 | Stop reason: ALTCHOICE

## 2018-02-06 ASSESSMENT — ENCOUNTER SYMPTOMS
SINUS PRESSURE: 1
COUGH: 1
VOMITING: 0
NAUSEA: 0
SORE THROAT: 1
RHINORRHEA: 0
WHEEZING: 0
SHORTNESS OF BREATH: 0
VOICE CHANGE: 1
DIARRHEA: 0

## 2018-02-07 NOTE — PATIENT INSTRUCTIONS

## 2018-02-15 ENCOUNTER — OFFICE VISIT (OUTPATIENT)
Dept: FAMILY MEDICINE CLINIC | Age: 53
End: 2018-02-15
Payer: COMMERCIAL

## 2018-02-15 VITALS
HEIGHT: 66 IN | WEIGHT: 183 LBS | HEART RATE: 72 BPM | SYSTOLIC BLOOD PRESSURE: 128 MMHG | BODY MASS INDEX: 29.41 KG/M2 | DIASTOLIC BLOOD PRESSURE: 72 MMHG

## 2018-02-15 DIAGNOSIS — R60.1 GENERALIZED EDEMA: ICD-10-CM

## 2018-02-15 DIAGNOSIS — G47.30 SLEEP APNEA, UNSPECIFIED TYPE: Primary | ICD-10-CM

## 2018-02-15 DIAGNOSIS — Z72.0 TOBACCO ABUSE: ICD-10-CM

## 2018-02-15 DIAGNOSIS — Z12.11 ENCOUNTER FOR SCREENING COLONOSCOPY: ICD-10-CM

## 2018-02-15 DIAGNOSIS — E78.5 HYPERLIPIDEMIA, UNSPECIFIED HYPERLIPIDEMIA TYPE: ICD-10-CM

## 2018-02-15 DIAGNOSIS — M05.9 RHEUMATOID ARTHRITIS WITH POSITIVE RHEUMATOID FACTOR, INVOLVING UNSPECIFIED SITE (HCC): ICD-10-CM

## 2018-02-15 DIAGNOSIS — J01.00 ACUTE NON-RECURRENT MAXILLARY SINUSITIS: ICD-10-CM

## 2018-02-15 DIAGNOSIS — Z12.31 VISIT FOR SCREENING MAMMOGRAM: Primary | ICD-10-CM

## 2018-02-15 DIAGNOSIS — F32.9 REACTIVE DEPRESSION: ICD-10-CM

## 2018-02-15 DIAGNOSIS — K21.9 GASTROESOPHAGEAL REFLUX DISEASE WITHOUT ESOPHAGITIS: ICD-10-CM

## 2018-02-15 PROCEDURE — 99214 OFFICE O/P EST MOD 30 MIN: CPT | Performed by: FAMILY MEDICINE

## 2018-02-15 RX ORDER — DEXTROMETHORPHAN HYDROBROMIDE AND PROMETHAZINE HYDROCHLORIDE 15; 6.25 MG/5ML; MG/5ML
5 SYRUP ORAL NIGHTLY PRN
Qty: 50 ML | Refills: 0 | Status: SHIPPED | OUTPATIENT
Start: 2018-02-15 | End: 2018-03-28 | Stop reason: ALTCHOICE

## 2018-02-15 RX ORDER — CLOTRIMAZOLE 10 MG/1
10 LOZENGE ORAL; TOPICAL
Qty: 50 TABLET | Refills: 0 | Status: SHIPPED | OUTPATIENT
Start: 2018-02-15 | End: 2018-02-25

## 2018-02-15 RX ORDER — BENZONATATE 100 MG/1
100 CAPSULE ORAL EVERY 8 HOURS PRN
Qty: 30 CAPSULE | Refills: 0 | Status: SHIPPED | OUTPATIENT
Start: 2018-02-15 | End: 2018-03-28 | Stop reason: ALTCHOICE

## 2018-02-15 ASSESSMENT — ENCOUNTER SYMPTOMS
SINUS PRESSURE: 1
EYES NEGATIVE: 1
COUGH: 1
GASTROINTESTINAL NEGATIVE: 1
SORE THROAT: 0
RHINORRHEA: 1
VOICE CHANGE: 1

## 2018-02-15 ASSESSMENT — PATIENT HEALTH QUESTIONNAIRE - PHQ9
1. LITTLE INTEREST OR PLEASURE IN DOING THINGS: 0
2. FEELING DOWN, DEPRESSED OR HOPELESS: 0
SUM OF ALL RESPONSES TO PHQ9 QUESTIONS 1 & 2: 0
SUM OF ALL RESPONSES TO PHQ QUESTIONS 1-9: 0

## 2018-02-15 NOTE — PROGRESS NOTES
Subjective:      Patient ID: Ivette Weeks is a 50 y.o. female. Pharyngitis   Associated symptoms include arthralgias (right wrist/mcps), congestion, coughing and fatigue. Pertinent negatives include no fever or sore throat. Hyperlipidemia       Routine follow up on hyperlipidemia. Her rheumatoid arthritis is ongoing. She has been on methotrexate  And humira. Not having complete remission in her migratory arthralgias. Moving through all the joints by report. Left hand quite swollen about 2 weeks ago by report. Started on a steroid burst through her rheumatologist , finishing today. Hands are better at present. Still achey. Recent sinus issues, likely starting with cold. Finishing augmentin. Compliant with medications at present. Some chronic hoarse voice. Smoker. Inhaled steroid use. Saw ent x 2 and had direct visualization of cords with just irritation. Past Medical History:   Diagnosis Date    Fibroid uterus     removed with laparoscopy     GERD (gastroesophageal reflux disease)     Hyperlipidemia     Rheumatoid arthritis (HCC)     Tobacco abuse        Past Surgical History:   Procedure Laterality Date    CHOLECYSTECTOMY      LAPAROSCOPY      exploratory\     Current Outpatient Prescriptions   Medication Sig Dispense Refill    amoxicillin-clavulanate (AUGMENTIN) 875-125 MG per tablet Take 1 tablet by mouth 2 times daily for 10 days With food.  20 tablet 0    benzonatate (TESSALON PERLES) 100 MG capsule Take 1 capsule by mouth every 8 hours as needed for Cough 30 capsule 0    furosemide (LASIX) 20 MG tablet TAKE 1 TABLET DAILY AS     NEEDED FOR EDEMA 90 tablet 0    mometasone (ASMANEX 30 METERED DOSES) 220 MCG/INH inhaler Inhale 2 puffs into the lungs daily 1 Inhaler 3    tiZANidine (ZANAFLEX) 2 MG tablet       cetirizine (ZYRTEC) 10 MG tablet Take 1 tablet by mouth daily 14 tablet 0    naproxen (NAPROSYN) 500 MG tablet       esomeprazole (NEXIUM) 40 MG delayed release capsule TAKE 1 CAPSULE EVERY MORNING BEFORE BREAKFAST 90 capsule 1    atorvastatin (LIPITOR) 20 MG tablet TAKE 1 TABLET DAILY 90 tablet 3    PARoxetine (PAXIL) 40 MG tablet Take 1 tablet by mouth every morning 90 tablet 1    Adalimumab (HUMIRA SC) Inject into the skin      methotrexate (RHEUMATREX) 2.5 MG chemo tablet Take by mouth once a week      albuterol sulfate HFA (VENTOLIN HFA) 108 (90 BASE) MCG/ACT inhaler Inhale 2 puffs into the lungs every 4 hours as needed for Wheezing or Shortness of Breath 1 Inhaler 0    folic acid (FOLVITE) 1 MG tablet Take 1 mg by mouth daily      vitamin D (CHOLECALCIFEROL) 1000 UNIT TABS tablet Take 1,000 Units by mouth daily       No current facility-administered medications for this visit. No Known Allergies      Review of Systems   Constitutional: Positive for fatigue. Negative for fever. HENT: Positive for congestion, rhinorrhea, sinus pressure and voice change. Negative for sore throat. Eyes: Negative. Respiratory: Positive for cough. Cardiovascular: Negative. Gastrointestinal: Negative. Endocrine: Negative. Genitourinary: Negative. Musculoskeletal: Positive for arthralgias (right wrist/mcps). Skin: Negative. Allergic/Immunologic: Positive for immunocompromised state. Neurological: Negative. Hematological: Negative. Psychiatric/Behavioral: Negative for dysphoric mood. Objective:   Physical Exam   Constitutional: She is oriented to person, place, and time. She appears well-developed and well-nourished. No distress. HENT:   Head: Normocephalic and atraumatic. Right Ear: External ear normal.   Left Ear: External ear normal.   Nose: Mucosal edema present. No rhinorrhea. Mouth/Throat: Uvula is midline. Posterior oropharyngeal erythema present. No oropharyngeal exudate or posterior oropharyngeal edema (crowded oral pharnyx , posterior tougue blocking views of oral pharnyx. ).        Eyes: Conjunctivae and EOM are normal. No

## 2018-02-25 DIAGNOSIS — E78.5 HYPERLIPIDEMIA, UNSPECIFIED HYPERLIPIDEMIA TYPE: ICD-10-CM

## 2018-02-26 RX ORDER — ATORVASTATIN CALCIUM 20 MG/1
TABLET, FILM COATED ORAL
Qty: 90 TABLET | Refills: 1 | Status: SHIPPED | OUTPATIENT
Start: 2018-02-26 | End: 2018-03-28 | Stop reason: SDUPTHER

## 2018-02-28 RX ORDER — PAROXETINE HYDROCHLORIDE 40 MG/1
40 TABLET, FILM COATED ORAL EVERY MORNING
Qty: 90 TABLET | Refills: 1 | Status: SHIPPED | OUTPATIENT
Start: 2018-02-28 | End: 2018-08-01 | Stop reason: SDUPTHER

## 2018-02-28 RX ORDER — PAROXETINE HYDROCHLORIDE 40 MG/1
40 TABLET, FILM COATED ORAL EVERY MORNING
Qty: 10 TABLET | Refills: 0 | Status: SHIPPED | OUTPATIENT
Start: 2018-02-28 | End: 2018-03-28 | Stop reason: SDUPTHER

## 2018-03-13 ENCOUNTER — TELEPHONE (OUTPATIENT)
Dept: FAMILY MEDICINE CLINIC | Age: 53
End: 2018-03-13

## 2018-03-13 RX ORDER — ZOLPIDEM TARTRATE 10 MG/1
10 TABLET ORAL NIGHTLY PRN
Qty: 3 TABLET | Refills: 0 | Status: SHIPPED | OUTPATIENT
Start: 2018-03-13 | End: 2018-03-16

## 2018-03-28 ENCOUNTER — NURSE ONLY (OUTPATIENT)
Dept: SURGERY | Age: 53
End: 2018-03-28

## 2018-03-28 VITALS
HEIGHT: 66 IN | WEIGHT: 180 LBS | BODY MASS INDEX: 28.93 KG/M2 | HEART RATE: 84 BPM | TEMPERATURE: 98.7 F | DIASTOLIC BLOOD PRESSURE: 70 MMHG | SYSTOLIC BLOOD PRESSURE: 124 MMHG

## 2018-03-28 DIAGNOSIS — Z12.11 COLON CANCER SCREENING: Primary | ICD-10-CM

## 2018-03-28 RX ORDER — PREDNISONE 10 MG/1
5 TABLET ORAL DAILY
COMMUNITY
Start: 2018-02-22 | End: 2020-01-16

## 2018-03-28 RX ORDER — POLYETHYLENE GLYCOL 3350, SODIUM CHLORIDE, SODIUM BICARBONATE, POTASSIUM CHLORIDE 420; 11.2; 5.72; 1.48 G/4L; G/4L; G/4L; G/4L
4000 POWDER, FOR SOLUTION ORAL ONCE
Qty: 4000 ML | Refills: 0 | Status: SHIPPED | OUTPATIENT
Start: 2018-03-28 | End: 2018-03-28

## 2018-03-28 NOTE — PROGRESS NOTES
 naproxen (NAPROSYN) 500 MG tablet       esomeprazole (NEXIUM) 40 MG delayed release capsule TAKE 1 CAPSULE EVERY MORNING BEFORE BREAKFAST 90 capsule 1    atorvastatin (LIPITOR) 20 MG tablet TAKE 1 TABLET DAILY 90 tablet 3    Adalimumab (HUMIRA SC) Inject into the skin      methotrexate (RHEUMATREX) 2.5 MG chemo tablet Take by mouth once a week      albuterol sulfate HFA (VENTOLIN HFA) 108 (90 BASE) MCG/ACT inhaler Inhale 2 puffs into the lungs every 4 hours as needed for Wheezing or Shortness of Breath 1 Inhaler 0    folic acid (FOLVITE) 1 MG tablet Take 1 mg by mouth daily      vitamin D (CHOLECALCIFEROL) 1000 UNIT TABS tablet Take 1,000 Units by mouth daily      tiZANidine (ZANAFLEX) 2 MG tablet        No current facility-administered medications on file prior to visit.       Allergies as of 03/28/2018    (No Known Allergies)           PEx:                ASSESSMENT:        PLAN:colonoscopy at [facility] 4/27/18

## 2018-04-03 ENCOUNTER — TELEPHONE (OUTPATIENT)
Dept: FAMILY MEDICINE CLINIC | Age: 53
End: 2018-04-03

## 2018-05-03 DIAGNOSIS — R60.1 GENERALIZED EDEMA: ICD-10-CM

## 2018-05-03 RX ORDER — FUROSEMIDE 20 MG/1
TABLET ORAL
Qty: 90 TABLET | Refills: 0 | Status: ON HOLD | OUTPATIENT
Start: 2018-05-03 | End: 2022-09-30 | Stop reason: ALTCHOICE

## 2018-05-03 RX ORDER — ESOMEPRAZOLE MAGNESIUM 40 MG/1
CAPSULE, DELAYED RELEASE ORAL
Qty: 90 CAPSULE | Refills: 0 | Status: SHIPPED | OUTPATIENT
Start: 2018-05-03 | End: 2018-09-27 | Stop reason: SDUPTHER

## 2018-08-01 RX ORDER — ESOMEPRAZOLE MAGNESIUM 40 MG/1
CAPSULE, DELAYED RELEASE ORAL
Qty: 90 CAPSULE | Refills: 3 | Status: SHIPPED | OUTPATIENT
Start: 2018-08-01 | End: 2019-07-05 | Stop reason: SDUPTHER

## 2018-08-01 RX ORDER — PAROXETINE HYDROCHLORIDE 40 MG/1
TABLET, FILM COATED ORAL
Qty: 90 TABLET | Refills: 3 | Status: SHIPPED | OUTPATIENT
Start: 2018-08-01 | End: 2019-07-05 | Stop reason: SDUPTHER

## 2018-08-25 ENCOUNTER — HOSPITAL ENCOUNTER (OUTPATIENT)
Dept: LAB | Age: 53
Setting detail: SPECIMEN
Discharge: HOME | End: 2018-08-25
Payer: COMMERCIAL

## 2018-08-25 DIAGNOSIS — E78.5 HYPERLIPIDEMIA, UNSPECIFIED HYPERLIPIDEMIA TYPE: ICD-10-CM

## 2018-08-25 DIAGNOSIS — M05.9 RHEUMATOID ARTHRITIS WITH POSITIVE RHEUMATOID FACTOR, INVOLVING UNSPECIFIED SITE (HCC): ICD-10-CM

## 2018-08-25 LAB
ABSOLUTE EOS #: 0.2 K/UL (ref 0–0.4)
ABSOLUTE IMMATURE GRANULOCYTE: ABNORMAL K/UL (ref 0–0.3)
ABSOLUTE LYMPH #: 3.5 K/UL (ref 1–4.8)
ABSOLUTE MONO #: 0.7 K/UL (ref 0.1–1.2)
ANION GAP SERPL CALCULATED.3IONS-SCNC: 12 MMOL/L (ref 9–17)
BASOPHILS # BLD: 1 % (ref 0–1)
BASOPHILS ABSOLUTE: 0.1 K/UL (ref 0–0.2)
BUN BLDV-MCNC: 19 MG/DL (ref 6–20)
BUN/CREAT BLD: 31 (ref 9–20)
CALCIUM SERPL-MCNC: 9.3 MG/DL (ref 8.6–10.4)
CHLORIDE BLD-SCNC: 109 MMOL/L (ref 98–107)
CHOLESTEROL/HDL RATIO: 3.3
CHOLESTEROL: 179 MG/DL
CO2: 25 MMOL/L (ref 20–31)
CREAT SERPL-MCNC: 0.62 MG/DL (ref 0.5–0.9)
DIFFERENTIAL TYPE: ABNORMAL
EOSINOPHILS RELATIVE PERCENT: 3 % (ref 1–7)
GFR AFRICAN AMERICAN: >60 ML/MIN
GFR NON-AFRICAN AMERICAN: >60 ML/MIN
GFR SERPL CREATININE-BSD FRML MDRD: ABNORMAL ML/MIN/{1.73_M2}
GFR SERPL CREATININE-BSD FRML MDRD: ABNORMAL ML/MIN/{1.73_M2}
GLUCOSE BLD-MCNC: 97 MG/DL (ref 70–99)
HCT VFR BLD CALC: 42.2 % (ref 36–46)
HDLC SERPL-MCNC: 54 MG/DL
HEMOGLOBIN: 14.6 G/DL (ref 12–16)
IMMATURE GRANULOCYTES: ABNORMAL %
LDL CHOLESTEROL: 99 MG/DL (ref 0–130)
LYMPHOCYTES # BLD: 44 % (ref 16–46)
MCH RBC QN AUTO: 34.9 PG (ref 26–34)
MCHC RBC AUTO-ENTMCNC: 34.6 G/DL (ref 31–37)
MCV RBC AUTO: 100.9 FL (ref 80–100)
MONOCYTES # BLD: 9 % (ref 4–11)
NRBC AUTOMATED: ABNORMAL PER 100 WBC
PDW BLD-RTO: 13.2 % (ref 11–14.5)
PLATELET # BLD: 251 K/UL (ref 140–450)
PLATELET ESTIMATE: ABNORMAL
PMV BLD AUTO: 8.6 FL (ref 6–12)
POTASSIUM SERPL-SCNC: 3.7 MMOL/L (ref 3.7–5.3)
RBC # BLD: 4.18 M/UL (ref 4–5.2)
RBC # BLD: ABNORMAL 10*6/UL
SEG NEUTROPHILS: 43 % (ref 43–77)
SEGMENTED NEUTROPHILS ABSOLUTE COUNT: 3.5 K/UL (ref 1.8–7.7)
SODIUM BLD-SCNC: 146 MMOL/L (ref 135–144)
TRIGL SERPL-MCNC: 130 MG/DL
VLDLC SERPL CALC-MCNC: NORMAL MG/DL (ref 1–30)
WBC # BLD: 8 K/UL (ref 3.5–11)
WBC # BLD: ABNORMAL 10*3/UL

## 2018-08-25 PROCEDURE — 36415 COLL VENOUS BLD VENIPUNCTURE: CPT

## 2018-08-25 PROCEDURE — 85025 COMPLETE CBC W/AUTO DIFF WBC: CPT

## 2018-08-25 PROCEDURE — 80061 LIPID PANEL: CPT

## 2018-08-25 PROCEDURE — 80048 BASIC METABOLIC PNL TOTAL CA: CPT

## 2018-09-27 ENCOUNTER — OFFICE VISIT (OUTPATIENT)
Dept: FAMILY MEDICINE CLINIC | Age: 53
End: 2018-09-27
Payer: COMMERCIAL

## 2018-09-27 VITALS
HEIGHT: 65 IN | SYSTOLIC BLOOD PRESSURE: 118 MMHG | WEIGHT: 179 LBS | HEART RATE: 72 BPM | BODY MASS INDEX: 29.82 KG/M2 | DIASTOLIC BLOOD PRESSURE: 68 MMHG

## 2018-09-27 DIAGNOSIS — G47.30 SLEEP APNEA, UNSPECIFIED TYPE: ICD-10-CM

## 2018-09-27 DIAGNOSIS — E78.5 HYPERLIPIDEMIA, UNSPECIFIED HYPERLIPIDEMIA TYPE: ICD-10-CM

## 2018-09-27 DIAGNOSIS — Z72.0 TOBACCO ABUSE: ICD-10-CM

## 2018-09-27 DIAGNOSIS — F32.9 REACTIVE DEPRESSION: ICD-10-CM

## 2018-09-27 DIAGNOSIS — K21.9 GASTROESOPHAGEAL REFLUX DISEASE WITHOUT ESOPHAGITIS: ICD-10-CM

## 2018-09-27 DIAGNOSIS — M05.9 RHEUMATOID ARTHRITIS WITH POSITIVE RHEUMATOID FACTOR, INVOLVING UNSPECIFIED SITE (HCC): ICD-10-CM

## 2018-09-27 DIAGNOSIS — R60.1 GENERALIZED EDEMA: Primary | ICD-10-CM

## 2018-09-27 PROCEDURE — 99214 OFFICE O/P EST MOD 30 MIN: CPT | Performed by: FAMILY MEDICINE

## 2018-09-27 RX ORDER — ATORVASTATIN CALCIUM 20 MG/1
TABLET, FILM COATED ORAL
Qty: 90 TABLET | Refills: 3 | Status: SHIPPED | OUTPATIENT
Start: 2018-09-27 | End: 2019-10-13 | Stop reason: SDUPTHER

## 2018-09-27 ASSESSMENT — ENCOUNTER SYMPTOMS
SORE THROAT: 0
GASTROINTESTINAL NEGATIVE: 1
EYES NEGATIVE: 1
SINUS PRESSURE: 0
RHINORRHEA: 0
COUGH: 0
RESPIRATORY NEGATIVE: 1
VOICE CHANGE: 0

## 2018-09-27 ASSESSMENT — PATIENT HEALTH QUESTIONNAIRE - PHQ9
SUM OF ALL RESPONSES TO PHQ QUESTIONS 1-9: 0
SUM OF ALL RESPONSES TO PHQ QUESTIONS 1-9: 0
SUM OF ALL RESPONSES TO PHQ9 QUESTIONS 1 & 2: 0
1. LITTLE INTEREST OR PLEASURE IN DOING THINGS: 0
2. FEELING DOWN, DEPRESSED OR HOPELESS: 0

## 2018-09-27 NOTE — PROGRESS NOTES
methotrexate (RHEUMATREX) 2.5 MG chemo tablet Take by mouth once a week      albuterol sulfate HFA (VENTOLIN HFA) 108 (90 BASE) MCG/ACT inhaler Inhale 2 puffs into the lungs every 4 hours as needed for Wheezing or Shortness of Breath 1 Inhaler 0    folic acid (FOLVITE) 1 MG tablet Take 1 mg by mouth daily      vitamin D (CHOLECALCIFEROL) 1000 UNIT TABS tablet Take 1,000 Units by mouth daily       No current facility-administered medications for this visit. No Known Allergies      Review of Systems   Constitutional: Negative for fatigue and fever. HENT: Negative for congestion, rhinorrhea, sinus pressure, sore throat and voice change. Eyes: Negative. Respiratory: Negative. Negative for cough. Cardiovascular: Negative. Gastrointestinal: Negative. Endocrine: Negative. Genitourinary: Negative. Musculoskeletal: Positive for arthralgias (right wrist/mcps). Skin: Negative. Allergic/Immunologic: Positive for immunocompromised state. Neurological: Negative. Hematological: Negative. Psychiatric/Behavioral: Negative for dysphoric mood. Objective:   Physical Exam   Constitutional: She is oriented to person, place, and time. She appears well-developed and well-nourished. No distress. HENT:   Head: Normocephalic and atraumatic. Right Ear: External ear normal.   Left Ear: External ear normal.   Nose: Mucosal edema present. No rhinorrhea. Mouth/Throat: Uvula is midline. Posterior oropharyngeal erythema present. No oropharyngeal exudate or posterior oropharyngeal edema (crowded oral pharnyx , posterior tougue blocking views of oral pharnyx. ). Eyes: Conjunctivae and EOM are normal. No scleral icterus. Neck: Neck supple. No thyromegaly present. Cardiovascular: Normal rate, regular rhythm, normal heart sounds and intact distal pulses. No murmur heard. Pulmonary/Chest: Effort normal and breath sounds normal. No respiratory distress. She has no wheezes.    Abdominal: Soft. Bowel sounds are normal. She exhibits no distension. There is no tenderness. There is no rebound. Musculoskeletal: Normal range of motion. She exhibits no edema or tenderness. Neurological: She is alert and oriented to person, place, and time. Skin: Skin is warm and dry. No rash noted. No erythema. Psychiatric: She has a normal mood and affect. Her behavior is normal. Judgment and thought content normal.     /68 (Site: Left Upper Arm, Position: Sitting, Cuff Size: Large Adult)   Pulse 72   Ht 5' 5\" (1.651 m)   Wt 179 lb (81.2 kg)   BMI 29.79 kg/m²   No visits with results within 1 Month(s) from this visit.    Latest known visit with results is:   Hospital Outpatient Visit on 08/25/2018   Component Date Value Ref Range Status    Cholesterol 08/25/2018 179  <200 mg/dL Final    HDL 08/25/2018 54  >40 mg/dL Final    LDL Cholesterol 08/25/2018 99  0 - 130 mg/dL Final    Chol/HDL Ratio 08/25/2018 3.3  <5 Final    Triglycerides 08/25/2018 130  <150 mg/dL Final    VLDL 08/25/2018 NOT REPORTED  1 - 30 mg/dL Final    Glucose 08/25/2018 97  70 - 99 mg/dL Final    BUN 08/25/2018 19  6 - 20 mg/dL Final    CREATININE 08/25/2018 0.62  0.50 - 0.90 mg/dL Final    Bun/Cre Ratio 08/25/2018 31* 9 - 20 Final    Calcium 08/25/2018 9.3  8.6 - 10.4 mg/dL Final    Sodium 08/25/2018 146* 135 - 144 mmol/L Final    Potassium 08/25/2018 3.7  3.7 - 5.3 mmol/L Final    Chloride 08/25/2018 109* 98 - 107 mmol/L Final    CO2 08/25/2018 25  20 - 31 mmol/L Final    Anion Gap 08/25/2018 12  9 - 17 mmol/L Final    GFR Non- 08/25/2018 >60  >60 mL/min Final    GFR  08/25/2018 >60  >60 mL/min Final    GFR Comment 08/25/2018        Final    GFR Staging 08/25/2018 NOT REPORTED   Final    WBC 08/25/2018 8.0  3.5 - 11.0 k/uL Final    RBC 08/25/2018 4.18  4.0 - 5.2 m/uL Final    Hemoglobin 08/25/2018 14.6  12.0 - 16.0 g/dL Final    Hematocrit 08/25/2018 42.2  36 - 46 % Final    MCV having migratory flares of joints. Still taking prednisone at 10mg/day. Following with rheumatologist , trialing humara at present along with mtx. Cont. Scheduled follow. Gerd: quiescent on nexium at present. Consider dosing reduction if possible. Increasing irritability and mood swings. She noted improvement at first, seeing some recurrent symptoms at present. Increased to 40mg/day. Doing well over the interval.  Plan to cont. Same. jet; started cpap. Mostly compliant. Mask ends up on the floor some nights. encouraged ongoing compliance. Updating mammogram, gyne exam and pap updated 11/17-ok. Colonoscopy due. Will schedule. Somewhat chronic sore throat at last visit. Soft palate red. No individual sores or ulceration in the nose or throat. Recent treatment for sinusitis. Consider yeast issue with chronic inhaled steroid use , vs other. Trial of antifungal troches. This seemed to help and resolve the issue. Reminder to rinse mouth after asmanex use. Recheck prn recurrent symptoms. Shingles vaccine. Will need to get permission from her rheumatologist due to her methotrexate use/contraindication. Menopausal, no menses in the last year. Discussed calcium and vitamin D supplement 2/day.

## 2018-10-30 ENCOUNTER — OFFICE VISIT (OUTPATIENT)
Dept: PRIMARY CARE CLINIC | Age: 53
End: 2018-10-30
Payer: COMMERCIAL

## 2018-10-30 VITALS
WEIGHT: 182 LBS | DIASTOLIC BLOOD PRESSURE: 78 MMHG | HEART RATE: 76 BPM | OXYGEN SATURATION: 96 % | BODY MASS INDEX: 29.25 KG/M2 | SYSTOLIC BLOOD PRESSURE: 122 MMHG | HEIGHT: 66 IN | TEMPERATURE: 98.3 F

## 2018-10-30 DIAGNOSIS — J06.9 UPPER RESPIRATORY TRACT INFECTION, UNSPECIFIED TYPE: Primary | ICD-10-CM

## 2018-10-30 DIAGNOSIS — J01.00 ACUTE NON-RECURRENT MAXILLARY SINUSITIS: ICD-10-CM

## 2018-10-30 PROCEDURE — 99213 OFFICE O/P EST LOW 20 MIN: CPT | Performed by: NURSE PRACTITIONER

## 2018-10-30 RX ORDER — BENZONATATE 100 MG/1
100 CAPSULE ORAL 3 TIMES DAILY PRN
Qty: 30 CAPSULE | Refills: 0 | Status: SHIPPED | OUTPATIENT
Start: 2018-10-30 | End: 2018-11-29 | Stop reason: SDUPTHER

## 2018-10-30 ASSESSMENT — ENCOUNTER SYMPTOMS
SORE THROAT: 1
RHINORRHEA: 1
GASTROINTESTINAL NEGATIVE: 1
COUGH: 1

## 2018-10-30 NOTE — PROGRESS NOTES
ALT 21 01/28/2017    LABGLOM >60 08/25/2018    GFRAA >60 08/25/2018       Outpatient Encounter Prescriptions as of 10/30/2018   Medication Sig Dispense Refill    benzonatate (TESSALON PERLES) 100 MG capsule Take 1 capsule by mouth 3 times daily as needed for Cough 30 capsule 0    atorvastatin (LIPITOR) 20 MG tablet TAKE 1 TABLET DAILY 90 tablet 3    esomeprazole (NEXIUM) 40 MG delayed release capsule TAKE 1 CAPSULE EVERY       MORNING BEFORE BREAKFAST 90 capsule 3    PARoxetine (PAXIL) 40 MG tablet TAKE 1 TABLET EVERY MORNING 90 tablet 3    furosemide (LASIX) 20 MG tablet TAKE 1 TABLET DAILY AS     NEEDED FOR EDEMA 90 tablet 0    predniSONE (DELTASONE) 10 MG tablet Take 1 tablet by mouth daily      mometasone (ASMANEX 30 METERED DOSES) 220 MCG/INH inhaler Inhale 2 puffs into the lungs daily 1 Inhaler 3    tiZANidine (ZANAFLEX) 2 MG tablet       naproxen (NAPROSYN) 500 MG tablet       Adalimumab (HUMIRA SC) Inject into the skin      methotrexate (RHEUMATREX) 2.5 MG chemo tablet Take by mouth once a week      albuterol sulfate HFA (VENTOLIN HFA) 108 (90 BASE) MCG/ACT inhaler Inhale 2 puffs into the lungs every 4 hours as needed for Wheezing or Shortness of Breath 1 Inhaler 0    folic acid (FOLVITE) 1 MG tablet Take 1 mg by mouth daily      vitamin D (CHOLECALCIFEROL) 1000 UNIT TABS tablet Take 1,000 Units by mouth daily       No facility-administered encounter medications on file as of 10/30/2018. Review of Systems   Constitutional: Positive for fatigue. Negative for chills and fever. HENT: Positive for rhinorrhea and sore throat. Respiratory: Positive for cough. Cardiovascular: Negative. Gastrointestinal: Negative. Genitourinary: Negative. Neurological: Negative. Physical Exam   Constitutional: She is oriented to person, place, and time. She appears well-developed and well-nourished. HENT:   Head: Normocephalic.    Right Ear: External ear normal.   Left Ear: External ear normal.   Nose: Nose normal.   Mouth/Throat: Oropharynx is clear and moist.   Neck: Normal range of motion. No thyromegaly present. Cardiovascular: Normal rate, regular rhythm and normal heart sounds. Pulmonary/Chest: Effort normal and breath sounds normal.   Musculoskeletal: Normal range of motion. Neurological: She is alert and oriented to person, place, and time. Skin: Skin is warm. Psychiatric: She has a normal mood and affect. Data reviewed:      ASSESSMENT:   Diagnosis Orders   1. Upper respiratory tract infection, unspecified type     2. Acute non-recurrent maxillary sinusitis  benzonatate (TESSALON PERLES) 100 MG capsule       PLAN:  Return if symptoms worsen or fail to improve.     Orders Placed This Encounter   Medications    benzonatate (TESSALON PERLES) 100 MG capsule     Sig: Take 1 capsule by mouth 3 times daily as needed for Cough     Dispense:  30 capsule     Refill:  0       Lungs clear throughout, no wheezing  Tessalon perles  Follow up if no improvement or worsening of symptoms      Electronically signed by PARAS Novoa CNP on 10/30/18 at 3:32 PM

## 2018-11-29 DIAGNOSIS — J01.00 ACUTE NON-RECURRENT MAXILLARY SINUSITIS: ICD-10-CM

## 2018-11-29 RX ORDER — BENZONATATE 100 MG/1
100 CAPSULE ORAL 3 TIMES DAILY PRN
Qty: 30 CAPSULE | Refills: 0 | Status: SHIPPED | OUTPATIENT
Start: 2018-11-29 | End: 2018-12-29

## 2018-12-12 ENCOUNTER — HOSPITAL ENCOUNTER (OUTPATIENT)
Dept: MAMMOGRAPHY | Age: 53
Discharge: HOME | End: 2018-12-14
Payer: COMMERCIAL

## 2018-12-12 DIAGNOSIS — Z12.31 SCREENING MAMMOGRAM, ENCOUNTER FOR: ICD-10-CM

## 2018-12-12 DIAGNOSIS — Z12.31 VISIT FOR SCREENING MAMMOGRAM: ICD-10-CM

## 2018-12-12 PROCEDURE — 77063 BREAST TOMOSYNTHESIS BI: CPT

## 2019-02-12 ENCOUNTER — OFFICE VISIT (OUTPATIENT)
Dept: PRIMARY CARE CLINIC | Age: 54
End: 2019-02-12
Payer: COMMERCIAL

## 2019-02-12 ENCOUNTER — HOSPITAL ENCOUNTER (OUTPATIENT)
Age: 54
Setting detail: SPECIMEN
Discharge: HOME | End: 2019-02-12
Payer: COMMERCIAL

## 2019-02-12 ENCOUNTER — OFFICE VISIT (OUTPATIENT)
Dept: OBGYN | Age: 54
End: 2019-02-12
Payer: COMMERCIAL

## 2019-02-12 VITALS
WEIGHT: 183 LBS | TEMPERATURE: 98.2 F | HEIGHT: 67 IN | BODY MASS INDEX: 28.72 KG/M2 | SYSTOLIC BLOOD PRESSURE: 124 MMHG | DIASTOLIC BLOOD PRESSURE: 80 MMHG | HEART RATE: 82 BPM

## 2019-02-12 VITALS
DIASTOLIC BLOOD PRESSURE: 80 MMHG | SYSTOLIC BLOOD PRESSURE: 124 MMHG | HEIGHT: 67 IN | WEIGHT: 183 LBS | BODY MASS INDEX: 28.72 KG/M2 | TEMPERATURE: 98.3 F | OXYGEN SATURATION: 98 % | HEART RATE: 82 BPM

## 2019-02-12 DIAGNOSIS — Z12.4 SCREENING FOR CERVICAL CANCER: ICD-10-CM

## 2019-02-12 DIAGNOSIS — J01.00 ACUTE NON-RECURRENT MAXILLARY SINUSITIS: Primary | ICD-10-CM

## 2019-02-12 DIAGNOSIS — Z12.31 VISIT FOR SCREENING MAMMOGRAM: ICD-10-CM

## 2019-02-12 DIAGNOSIS — Z01.419 WELL FEMALE EXAM WITH ROUTINE GYNECOLOGICAL EXAM: Primary | ICD-10-CM

## 2019-02-12 DIAGNOSIS — R05.9 COUGH: ICD-10-CM

## 2019-02-12 DIAGNOSIS — R51.9 INTRACTABLE HEADACHE, UNSPECIFIED CHRONICITY PATTERN, UNSPECIFIED HEADACHE TYPE: ICD-10-CM

## 2019-02-12 LAB
INFLUENZA A ANTIBODY: NEGATIVE
INFLUENZA B ANTIBODY: NEGATIVE

## 2019-02-12 PROCEDURE — 99213 OFFICE O/P EST LOW 20 MIN: CPT | Performed by: NURSE PRACTITIONER

## 2019-02-12 PROCEDURE — 99396 PREV VISIT EST AGE 40-64: CPT | Performed by: NURSE PRACTITIONER

## 2019-02-12 PROCEDURE — G0145 SCR C/V CYTO,THINLAYER,RESCR: HCPCS

## 2019-02-12 PROCEDURE — 87804 INFLUENZA ASSAY W/OPTIC: CPT | Performed by: NURSE PRACTITIONER

## 2019-02-12 RX ORDER — AMOXICILLIN AND CLAVULANATE POTASSIUM 875; 125 MG/1; MG/1
1 TABLET, FILM COATED ORAL 2 TIMES DAILY
Qty: 20 TABLET | Refills: 0 | Status: SHIPPED | OUTPATIENT
Start: 2019-02-12 | End: 2019-02-22

## 2019-02-12 ASSESSMENT — PATIENT HEALTH QUESTIONNAIRE - PHQ9
2. FEELING DOWN, DEPRESSED OR HOPELESS: 1
SUM OF ALL RESPONSES TO PHQ QUESTIONS 1-9: 2
SUM OF ALL RESPONSES TO PHQ QUESTIONS 1-9: 2
SUM OF ALL RESPONSES TO PHQ9 QUESTIONS 1 & 2: 2
1. LITTLE INTEREST OR PLEASURE IN DOING THINGS: 1

## 2019-02-12 ASSESSMENT — ENCOUNTER SYMPTOMS
SORE THROAT: 1
RHINORRHEA: 1
COUGH: 1
SINUS PAIN: 1
SINUS PRESSURE: 1
SHORTNESS OF BREATH: 0
WHEEZING: 0

## 2019-02-14 LAB — COMMENT: NORMAL

## 2019-02-25 LAB — CYTOLOGY REPORT: NORMAL

## 2019-02-26 ENCOUNTER — TELEPHONE (OUTPATIENT)
Dept: OBGYN | Age: 54
End: 2019-02-26

## 2019-02-26 RX ORDER — FLUCONAZOLE 150 MG/1
TABLET ORAL
Qty: 2 TABLET | Refills: 1 | Status: SHIPPED | OUTPATIENT
Start: 2019-02-26 | End: 2019-04-03 | Stop reason: ALTCHOICE

## 2019-04-03 ENCOUNTER — OFFICE VISIT (OUTPATIENT)
Dept: FAMILY MEDICINE CLINIC | Age: 54
End: 2019-04-03
Payer: COMMERCIAL

## 2019-04-03 VITALS
SYSTOLIC BLOOD PRESSURE: 124 MMHG | HEIGHT: 67 IN | WEIGHT: 183 LBS | DIASTOLIC BLOOD PRESSURE: 78 MMHG | HEART RATE: 80 BPM | BODY MASS INDEX: 28.72 KG/M2

## 2019-04-03 DIAGNOSIS — R60.1 GENERALIZED EDEMA: ICD-10-CM

## 2019-04-03 DIAGNOSIS — K21.9 GASTROESOPHAGEAL REFLUX DISEASE WITHOUT ESOPHAGITIS: ICD-10-CM

## 2019-04-03 DIAGNOSIS — G47.30 SLEEP APNEA, UNSPECIFIED TYPE: ICD-10-CM

## 2019-04-03 DIAGNOSIS — J31.2 CHRONIC SORE THROAT: ICD-10-CM

## 2019-04-03 DIAGNOSIS — M05.9 RHEUMATOID ARTHRITIS WITH POSITIVE RHEUMATOID FACTOR, INVOLVING UNSPECIFIED SITE (HCC): ICD-10-CM

## 2019-04-03 DIAGNOSIS — F32.9 REACTIVE DEPRESSION: ICD-10-CM

## 2019-04-03 DIAGNOSIS — Z72.0 TOBACCO ABUSE: ICD-10-CM

## 2019-04-03 DIAGNOSIS — E78.5 HYPERLIPIDEMIA, UNSPECIFIED HYPERLIPIDEMIA TYPE: Primary | ICD-10-CM

## 2019-04-03 PROCEDURE — 99214 OFFICE O/P EST MOD 30 MIN: CPT | Performed by: FAMILY MEDICINE

## 2019-04-03 ASSESSMENT — ENCOUNTER SYMPTOMS
EYES NEGATIVE: 1
RHINORRHEA: 0
RESPIRATORY NEGATIVE: 1
SINUS PRESSURE: 0
COUGH: 0
GASTROINTESTINAL NEGATIVE: 1
SORE THROAT: 0
VOICE CHANGE: 0

## 2019-04-03 NOTE — PROGRESS NOTES
(HUMIRA SC) Inject into the skin      methotrexate (RHEUMATREX) 2.5 MG chemo tablet Take by mouth once a week      albuterol sulfate HFA (VENTOLIN HFA) 108 (90 BASE) MCG/ACT inhaler Inhale 2 puffs into the lungs every 4 hours as needed for Wheezing or Shortness of Breath 1 Inhaler 0    folic acid (FOLVITE) 1 MG tablet Take 1 mg by mouth daily      vitamin D (CHOLECALCIFEROL) 1000 UNIT TABS tablet Take 1,000 Units by mouth daily       No current facility-administered medications for this visit. No Known Allergies      Review of Systems   Constitutional: Negative for fatigue and fever. HENT: Negative for congestion, rhinorrhea, sinus pressure, sore throat and voice change. Eyes: Negative. Respiratory: Negative. Negative for cough. Cardiovascular: Negative. Gastrointestinal: Negative. Endocrine: Negative. Genitourinary: Negative. Musculoskeletal: Positive for arthralgias (right wrist/mcps, toes/fingers). Skin: Negative. Allergic/Immunologic: Positive for immunocompromised state. Neurological: Negative. Hematological: Negative. Psychiatric/Behavioral: Negative for dysphoric mood. Objective:   Physical Exam   Constitutional: She is oriented to person, place, and time. She appears well-developed and well-nourished. No distress. HENT:   Head: Normocephalic and atraumatic. Mouth/Throat: Uvula is midline. Eyes: Conjunctivae and EOM are normal. No scleral icterus. Neck: Neck supple. No thyromegaly present. Cardiovascular: Normal rate, regular rhythm, normal heart sounds and intact distal pulses. No murmur heard. Pulmonary/Chest: Effort normal and breath sounds normal. No respiratory distress. She has no wheezes. Abdominal: Soft. Bowel sounds are normal. She exhibits no distension. There is no tenderness. There is no rebound. Musculoskeletal: Normal range of motion. She exhibits no edema or tenderness.    Neurological: She is alert and oriented to person, place, and time. Skin: Skin is warm and dry. No rash noted. No erythema. Psychiatric: She has a normal mood and affect. Her behavior is normal. Judgment and thought content normal.     /78 (Site: Right Upper Arm, Position: Sitting, Cuff Size: Large Adult)   Pulse 80   Ht 5' 7.01\" (1.702 m)   Wt 183 lb (83 kg)   LMP 01/18/2018 (Within Months)   BMI 28.66 kg/m²     Assessment:          Encounter Diagnoses   Name Primary?  Hyperlipidemia, unspecified hyperlipidemia type Yes    Generalized edema     Tobacco abuse     Rheumatoid arthritis with positive rheumatoid factor, involving unspecified site (HCC)     Gastroesophageal reflux disease without esophagitis     Reactive depression     Sleep apnea, unspecified type     Chronic sore throat        Plan:      Hyperlipidemia: updating labs on lipitor. Plan to cont. Same. Edema: some lower ext. Edema, complicated by RA swelling of ankles and other joints. Using lasix at 20mg/day to good effect. Bmp update pending. .    Tobacco abuse: ongoing 1/2 ppd. Rec. Cessation , contemplating at present. Failed chantix. RA: mtx not seemingly effective at present. Still having migratory flares of joints. Still taking prednisone at 10mg/day. Following with rheumatologist , trialing humara at present along with mtx. Cont. Scheduled follow. Gerd: quiescent on nexium at present. She reports symptoms fairly quickly off the medication. Opting to cont. Daily dosing. Increasing irritability and mood swings. She noted improvement at first, seeing some recurrent symptoms at last check. Increased to 40mg/day. Doing well over the interval.  Noted improvement. Plan to cont. Same. jet; started cpap. Mostly compliant. Mask ends up on the floor some nights. encouraged ongoing compliance. Updating mammogram, gyne exam and pap updated 11/17-ok. Colonoscopy updated 4/27/17,  Adenoma x 1. Repeat 5 yrs.      Somewhat chronic sore

## 2019-04-11 ENCOUNTER — HOSPITAL ENCOUNTER (OUTPATIENT)
Dept: LAB | Age: 54
Discharge: HOME | End: 2019-04-11
Payer: COMMERCIAL

## 2019-04-11 DIAGNOSIS — E78.5 HYPERLIPIDEMIA, UNSPECIFIED HYPERLIPIDEMIA TYPE: ICD-10-CM

## 2019-04-11 DIAGNOSIS — M05.9 RHEUMATOID ARTHRITIS WITH POSITIVE RHEUMATOID FACTOR, INVOLVING UNSPECIFIED SITE (HCC): ICD-10-CM

## 2019-04-11 LAB
ABSOLUTE EOS #: 0.2 K/UL (ref 0–0.4)
ABSOLUTE IMMATURE GRANULOCYTE: ABNORMAL K/UL (ref 0–0.3)
ABSOLUTE LYMPH #: 3.5 K/UL (ref 1–4.8)
ABSOLUTE MONO #: 0.7 K/UL (ref 0.1–1.2)
ALBUMIN SERPL-MCNC: 4.2 G/DL (ref 3.5–5.2)
ALBUMIN/GLOBULIN RATIO: 1.5 (ref 1–2.5)
ALP BLD-CCNC: 73 U/L (ref 35–104)
ALT SERPL-CCNC: 28 U/L (ref 5–33)
ANION GAP SERPL CALCULATED.3IONS-SCNC: 10 MMOL/L (ref 9–17)
AST SERPL-CCNC: 23 U/L
BASOPHILS # BLD: 1 % (ref 0–1)
BASOPHILS ABSOLUTE: 0.1 K/UL (ref 0–0.2)
BILIRUB SERPL-MCNC: 0.23 MG/DL (ref 0.3–1.2)
BUN BLDV-MCNC: 18 MG/DL (ref 6–20)
BUN/CREAT BLD: 26 (ref 9–20)
CALCIUM SERPL-MCNC: 9.4 MG/DL (ref 8.6–10.4)
CHLORIDE BLD-SCNC: 103 MMOL/L (ref 98–107)
CHOLESTEROL/HDL RATIO: 2.8
CHOLESTEROL: 189 MG/DL
CO2: 26 MMOL/L (ref 20–31)
CREAT SERPL-MCNC: 0.68 MG/DL (ref 0.5–0.9)
DIFFERENTIAL TYPE: ABNORMAL
EOSINOPHILS RELATIVE PERCENT: 2 % (ref 1–7)
GFR AFRICAN AMERICAN: >60 ML/MIN
GFR NON-AFRICAN AMERICAN: >60 ML/MIN
GFR SERPL CREATININE-BSD FRML MDRD: ABNORMAL ML/MIN/{1.73_M2}
GFR SERPL CREATININE-BSD FRML MDRD: ABNORMAL ML/MIN/{1.73_M2}
GLUCOSE BLD-MCNC: 105 MG/DL (ref 70–99)
HCT VFR BLD CALC: 41.9 % (ref 36–46)
HDLC SERPL-MCNC: 67 MG/DL
HEMOGLOBIN: 14.1 G/DL (ref 12–16)
IMMATURE GRANULOCYTES: ABNORMAL %
LDL CHOLESTEROL: 103 MG/DL (ref 0–130)
LYMPHOCYTES # BLD: 38 % (ref 16–46)
MCH RBC QN AUTO: 34.5 PG (ref 26–34)
MCHC RBC AUTO-ENTMCNC: 33.7 G/DL (ref 31–37)
MCV RBC AUTO: 102.5 FL (ref 80–100)
MONOCYTES # BLD: 7 % (ref 4–11)
NRBC AUTOMATED: ABNORMAL PER 100 WBC
PDW BLD-RTO: 13.6 % (ref 11–14.5)
PLATELET # BLD: 253 K/UL (ref 140–450)
PLATELET ESTIMATE: ABNORMAL
PMV BLD AUTO: 8.2 FL (ref 6–12)
POTASSIUM SERPL-SCNC: 3.7 MMOL/L (ref 3.7–5.3)
RBC # BLD: 4.09 M/UL (ref 4–5.2)
RBC # BLD: ABNORMAL 10*6/UL
SEDIMENTATION RATE, ERYTHROCYTE: 8 MM (ref 0–30)
SEG NEUTROPHILS: 52 % (ref 43–77)
SEGMENTED NEUTROPHILS ABSOLUTE COUNT: 4.8 K/UL (ref 1.8–7.7)
SODIUM BLD-SCNC: 139 MMOL/L (ref 135–144)
TOTAL PROTEIN: 7 G/DL (ref 6.4–8.3)
TRIGL SERPL-MCNC: 95 MG/DL
VLDLC SERPL CALC-MCNC: NORMAL MG/DL (ref 1–30)
WBC # BLD: 9.3 K/UL (ref 3.5–11)
WBC # BLD: ABNORMAL 10*3/UL

## 2019-04-11 PROCEDURE — 80061 LIPID PANEL: CPT

## 2019-04-11 PROCEDURE — 80053 COMPREHEN METABOLIC PANEL: CPT

## 2019-04-11 PROCEDURE — 36415 COLL VENOUS BLD VENIPUNCTURE: CPT

## 2019-04-11 PROCEDURE — 85025 COMPLETE CBC W/AUTO DIFF WBC: CPT

## 2019-04-11 PROCEDURE — 85651 RBC SED RATE NONAUTOMATED: CPT

## 2019-06-29 ENCOUNTER — OFFICE VISIT (OUTPATIENT)
Dept: PRIMARY CARE CLINIC | Age: 54
End: 2019-06-29
Payer: COMMERCIAL

## 2019-06-29 ENCOUNTER — HOSPITAL ENCOUNTER (INPATIENT)
Age: 54
LOS: 3 days | Discharge: HOME | DRG: 195 | End: 2019-07-02
Attending: EMERGENCY MEDICINE | Admitting: INTERNAL MEDICINE
Payer: COMMERCIAL

## 2019-06-29 VITALS
HEART RATE: 108 BPM | BODY MASS INDEX: 32.42 KG/M2 | DIASTOLIC BLOOD PRESSURE: 74 MMHG | SYSTOLIC BLOOD PRESSURE: 138 MMHG | OXYGEN SATURATION: 94 % | HEIGHT: 63 IN | TEMPERATURE: 103.6 F

## 2019-06-29 DIAGNOSIS — R51.9 ACUTE NONINTRACTABLE HEADACHE, UNSPECIFIED HEADACHE TYPE: ICD-10-CM

## 2019-06-29 DIAGNOSIS — R50.9 FEVER, UNSPECIFIED FEVER CAUSE: Primary | ICD-10-CM

## 2019-06-29 DIAGNOSIS — J18.9 PNEUMONIA DUE TO ORGANISM: Primary | ICD-10-CM

## 2019-06-29 DIAGNOSIS — R52 GENERALIZED BODY ACHES: ICD-10-CM

## 2019-06-29 LAB
INFLUENZA A ANTIBODY: NORMAL
INFLUENZA B ANTIBODY: NORMAL

## 2019-06-29 PROCEDURE — 94664 DEMO&/EVAL PT USE INHALER: CPT

## 2019-06-29 PROCEDURE — 6370000000 HC RX 637 (ALT 250 FOR IP): Performed by: EMERGENCY MEDICINE

## 2019-06-29 PROCEDURE — 96365 THER/PROPH/DIAG IV INF INIT: CPT

## 2019-06-29 PROCEDURE — 99285 EMERGENCY DEPT VISIT HI MDM: CPT

## 2019-06-29 PROCEDURE — 2580000003 HC RX 258: Performed by: NURSE PRACTITIONER

## 2019-06-29 PROCEDURE — 94150 VITAL CAPACITY TEST: CPT

## 2019-06-29 PROCEDURE — 36415 COLL VENOUS BLD VENIPUNCTURE: CPT

## 2019-06-29 PROCEDURE — 2060000000 HC ICU INTERMEDIATE R&B

## 2019-06-29 PROCEDURE — 96375 TX/PRO/DX INJ NEW DRUG ADDON: CPT

## 2019-06-29 PROCEDURE — 94760 N-INVAS EAR/PLS OXIMETRY 1: CPT

## 2019-06-29 PROCEDURE — 87040 BLOOD CULTURE FOR BACTERIA: CPT

## 2019-06-29 PROCEDURE — 96376 TX/PRO/DX INJ SAME DRUG ADON: CPT

## 2019-06-29 PROCEDURE — 6360000002 HC RX W HCPCS: Performed by: EMERGENCY MEDICINE

## 2019-06-29 PROCEDURE — 2580000003 HC RX 258: Performed by: EMERGENCY MEDICINE

## 2019-06-29 PROCEDURE — 87804 INFLUENZA ASSAY W/OPTIC: CPT | Performed by: FAMILY MEDICINE

## 2019-06-29 PROCEDURE — 6360000002 HC RX W HCPCS

## 2019-06-29 RX ORDER — ONDANSETRON 2 MG/ML
4 INJECTION INTRAMUSCULAR; INTRAVENOUS ONCE
Status: COMPLETED | OUTPATIENT
Start: 2019-06-29 | End: 2019-06-29

## 2019-06-29 RX ORDER — KETOROLAC TROMETHAMINE 30 MG/ML
30 INJECTION, SOLUTION INTRAMUSCULAR; INTRAVENOUS ONCE
Status: COMPLETED | OUTPATIENT
Start: 2019-06-29 | End: 2019-06-29

## 2019-06-29 RX ORDER — ONDANSETRON 2 MG/ML
4 INJECTION INTRAMUSCULAR; INTRAVENOUS EVERY 6 HOURS PRN
Status: DISCONTINUED | OUTPATIENT
Start: 2019-06-29 | End: 2019-07-02 | Stop reason: HOSPADM

## 2019-06-29 RX ORDER — FENTANYL CITRATE 50 UG/ML
INJECTION, SOLUTION INTRAMUSCULAR; INTRAVENOUS
Status: COMPLETED
Start: 2019-06-29 | End: 2019-06-29

## 2019-06-29 RX ORDER — SODIUM CHLORIDE 0.9 % (FLUSH) 0.9 %
10 SYRINGE (ML) INJECTION PRN
Status: DISCONTINUED | OUTPATIENT
Start: 2019-06-29 | End: 2019-07-02 | Stop reason: HOSPADM

## 2019-06-29 RX ORDER — PREDNISONE 10 MG/1
10 TABLET ORAL DAILY
Status: DISCONTINUED | OUTPATIENT
Start: 2019-06-30 | End: 2019-07-02 | Stop reason: HOSPADM

## 2019-06-29 RX ORDER — IPRATROPIUM BROMIDE AND ALBUTEROL SULFATE 2.5; .5 MG/3ML; MG/3ML
1 SOLUTION RESPIRATORY (INHALATION)
Status: DISCONTINUED | OUTPATIENT
Start: 2019-06-30 | End: 2019-06-30

## 2019-06-29 RX ORDER — ACETAMINOPHEN 500 MG
1000 TABLET ORAL ONCE
Status: COMPLETED | OUTPATIENT
Start: 2019-06-29 | End: 2019-06-29

## 2019-06-29 RX ORDER — PANTOPRAZOLE SODIUM 40 MG/1
40 TABLET, DELAYED RELEASE ORAL
Status: DISCONTINUED | OUTPATIENT
Start: 2019-06-30 | End: 2019-07-02 | Stop reason: HOSPADM

## 2019-06-29 RX ORDER — SODIUM CHLORIDE 9 MG/ML
INJECTION, SOLUTION INTRAVENOUS CONTINUOUS
Status: DISCONTINUED | OUTPATIENT
Start: 2019-06-29 | End: 2019-07-01

## 2019-06-29 RX ORDER — PAROXETINE HYDROCHLORIDE 20 MG/1
40 TABLET, FILM COATED ORAL DAILY
Status: DISCONTINUED | OUTPATIENT
Start: 2019-06-30 | End: 2019-07-02 | Stop reason: HOSPADM

## 2019-06-29 RX ORDER — NICOTINE 21 MG/24HR
1 PATCH, TRANSDERMAL 24 HOURS TRANSDERMAL DAILY PRN
Status: DISCONTINUED | OUTPATIENT
Start: 2019-06-29 | End: 2019-07-02 | Stop reason: HOSPADM

## 2019-06-29 RX ORDER — SODIUM CHLORIDE 0.9 % (FLUSH) 0.9 %
10 SYRINGE (ML) INJECTION EVERY 12 HOURS SCHEDULED
Status: DISCONTINUED | OUTPATIENT
Start: 2019-06-29 | End: 2019-07-02 | Stop reason: HOSPADM

## 2019-06-29 RX ORDER — ALBUTEROL SULFATE 90 UG/1
2 AEROSOL, METERED RESPIRATORY (INHALATION) EVERY 4 HOURS PRN
Status: DISCONTINUED | OUTPATIENT
Start: 2019-06-29 | End: 2019-06-30

## 2019-06-29 RX ORDER — FLUTICASONE PROPIONATE 110 UG/1
2 AEROSOL, METERED RESPIRATORY (INHALATION) 2 TIMES DAILY
Status: DISCONTINUED | OUTPATIENT
Start: 2019-06-29 | End: 2019-07-02 | Stop reason: HOSPADM

## 2019-06-29 RX ORDER — FOLIC ACID 1 MG/1
1 TABLET ORAL DAILY
Status: DISCONTINUED | OUTPATIENT
Start: 2019-06-30 | End: 2019-07-02 | Stop reason: HOSPADM

## 2019-06-29 RX ORDER — FENTANYL CITRATE 50 UG/ML
50 INJECTION, SOLUTION INTRAMUSCULAR; INTRAVENOUS ONCE
Status: COMPLETED | OUTPATIENT
Start: 2019-06-29 | End: 2019-06-29

## 2019-06-29 RX ORDER — ACETAMINOPHEN 325 MG/1
650 TABLET ORAL EVERY 4 HOURS PRN
Status: DISCONTINUED | OUTPATIENT
Start: 2019-06-29 | End: 2019-07-02 | Stop reason: HOSPADM

## 2019-06-29 RX ORDER — ALBUTEROL SULFATE 2.5 MG/3ML
2.5 SOLUTION RESPIRATORY (INHALATION)
Status: DISCONTINUED | OUTPATIENT
Start: 2019-06-29 | End: 2019-07-02 | Stop reason: HOSPADM

## 2019-06-29 RX ORDER — 0.9 % SODIUM CHLORIDE 0.9 %
1000 INTRAVENOUS SOLUTION INTRAVENOUS ONCE
Status: COMPLETED | OUTPATIENT
Start: 2019-06-29 | End: 2019-06-29

## 2019-06-29 RX ADMIN — SODIUM CHLORIDE: 9 INJECTION, SOLUTION INTRAVENOUS at 21:50

## 2019-06-29 RX ADMIN — FENTANYL CITRATE 50 MCG: 50 INJECTION, SOLUTION INTRAMUSCULAR; INTRAVENOUS at 18:21

## 2019-06-29 RX ADMIN — AZITHROMYCIN MONOHYDRATE 500 MG: 500 INJECTION, POWDER, LYOPHILIZED, FOR SOLUTION INTRAVENOUS at 18:58

## 2019-06-29 RX ADMIN — SODIUM CHLORIDE, PRESERVATIVE FREE 10 ML: 5 INJECTION INTRAVENOUS at 21:50

## 2019-06-29 RX ADMIN — SODIUM CHLORIDE 1000 ML: 9 INJECTION, SOLUTION INTRAVENOUS at 17:30

## 2019-06-29 RX ADMIN — SODIUM CHLORIDE 1000 ML: 9 INJECTION, SOLUTION INTRAVENOUS at 18:30

## 2019-06-29 RX ADMIN — ONDANSETRON 4 MG: 2 INJECTION INTRAMUSCULAR; INTRAVENOUS at 18:58

## 2019-06-29 RX ADMIN — ACETAMINOPHEN 1000 MG: 500 TABLET, FILM COATED ORAL at 19:03

## 2019-06-29 RX ADMIN — CEFTRIAXONE 1 G: 1 INJECTION, POWDER, FOR SOLUTION INTRAMUSCULAR; INTRAVENOUS at 18:29

## 2019-06-29 RX ADMIN — ONDANSETRON 4 MG: 2 INJECTION INTRAMUSCULAR; INTRAVENOUS at 19:43

## 2019-06-29 RX ADMIN — FENTANYL CITRATE 50 MCG: 50 INJECTION, SOLUTION INTRAMUSCULAR; INTRAVENOUS at 19:52

## 2019-06-29 RX ADMIN — KETOROLAC TROMETHAMINE 30 MG: 30 INJECTION, SOLUTION INTRAMUSCULAR; INTRAVENOUS at 17:41

## 2019-06-29 ASSESSMENT — PAIN DESCRIPTION - LOCATION
LOCATION: HEAD

## 2019-06-29 ASSESSMENT — PAIN DESCRIPTION - FREQUENCY
FREQUENCY: CONTINUOUS

## 2019-06-29 ASSESSMENT — ENCOUNTER SYMPTOMS
BLOOD IN STOOL: 0
SHORTNESS OF BREATH: 0
CONSTIPATION: 0
NAUSEA: 1
VOMITING: 1
EYE PAIN: 0
COUGH: 1
BACK PAIN: 0
DIARRHEA: 0
ABDOMINAL PAIN: 0

## 2019-06-29 ASSESSMENT — PAIN DESCRIPTION - DESCRIPTORS
DESCRIPTORS: CONSTANT;THROBBING
DESCRIPTORS: ACHING
DESCRIPTORS: ACHING
DESCRIPTORS: CONSTANT;THROBBING

## 2019-06-29 ASSESSMENT — PAIN SCALES - GENERAL
PAINLEVEL_OUTOF10: 9
PAINLEVEL_OUTOF10: 8
PAINLEVEL_OUTOF10: 4
PAINLEVEL_OUTOF10: 8
PAINLEVEL_OUTOF10: 6
PAINLEVEL_OUTOF10: 5
PAINLEVEL_OUTOF10: 9

## 2019-06-29 ASSESSMENT — PAIN DESCRIPTION - PAIN TYPE
TYPE: ACUTE PAIN

## 2019-06-29 NOTE — ED PROVIDER NOTES
abuse.    SURGICAL HISTORY      has a past surgical history that includes laparoscopy; Cholecystectomy; and Colonoscopy (2018). CURRENT MEDICATIONS       Previous Medications    ADALIMUMAB (HUMIRA SC)    Inject into the skin    ALBUTEROL SULFATE HFA (VENTOLIN HFA) 108 (90 BASE) MCG/ACT INHALER    Inhale 2 puffs into the lungs every 4 hours as needed for Wheezing or Shortness of Breath    ATORVASTATIN (LIPITOR) 20 MG TABLET    TAKE 1 TABLET DAILY    ESOMEPRAZOLE (NEXIUM) 40 MG DELAYED RELEASE CAPSULE    TAKE 1 CAPSULE EVERY       MORNING BEFORE BREAKFAST    FOLIC ACID (FOLVITE) 1 MG TABLET    Take 1 mg by mouth daily    FUROSEMIDE (LASIX) 20 MG TABLET    TAKE 1 TABLET DAILY AS     NEEDED FOR EDEMA    METHOTREXATE (RHEUMATREX) 2.5 MG CHEMO TABLET    Take by mouth once a week    MOMETASONE (ASMANEX 30 METERED DOSES) 220 MCG/INH INHALER    Inhale 2 puffs into the lungs daily    NAPROXEN (NAPROSYN) 500 MG TABLET        PAROXETINE (PAXIL) 40 MG TABLET    TAKE 1 TABLET EVERY MORNING    PREDNISONE (DELTASONE) 10 MG TABLET    Take 1 tablet by mouth daily    TIZANIDINE (ZANAFLEX) 2 MG TABLET        VITAMIN D (CHOLECALCIFEROL) 1000 UNIT TABS TABLET    Take 1,000 Units by mouth daily       ALLERGIES     has No Known Allergies. FAMILY HISTORY     indicated that her mother is . She indicated that her father is . family history includes Cancer in her mother; Heart Disease in her father. SOCIAL HISTORY      reports that she has been smoking cigarettes. She has a 30.00 pack-year smoking history. She has never used smokeless tobacco. She reports that she drinks alcohol. She reports that she does not use drugs. PHYSICAL EXAM     INITIAL VITALS:  tympanic temperature is 102.2 °F (39 °C). Her blood pressure is 146/82 (abnormal) and her pulse is 98. Her respiration is 18 and oxygen saturation is 98%.       Well-developed female who is febrile but nontoxic alert and oriented  Physical Exam

## 2019-06-29 NOTE — PROGRESS NOTES
Pt taken to [facility] ED for dehydration sx, fever of 103, migraine, and dry mucous membranes.

## 2019-06-30 ENCOUNTER — APPOINTMENT (OUTPATIENT)
Dept: GENERAL RADIOLOGY | Age: 54
DRG: 195 | End: 2019-06-30
Payer: COMMERCIAL

## 2019-06-30 LAB
ANION GAP SERPL CALCULATED.3IONS-SCNC: 14 MMOL/L (ref 9–17)
BUN BLDV-MCNC: 12 MG/DL (ref 6–20)
BUN/CREAT BLD: 24 (ref 9–20)
CALCIUM SERPL-MCNC: 9.1 MG/DL (ref 8.6–10.4)
CHLORIDE BLD-SCNC: 101 MMOL/L (ref 98–107)
CO2: 21 MMOL/L (ref 20–31)
CREAT SERPL-MCNC: 0.5 MG/DL (ref 0.5–0.9)
GFR AFRICAN AMERICAN: >60 ML/MIN
GFR NON-AFRICAN AMERICAN: >60 ML/MIN
GFR SERPL CREATININE-BSD FRML MDRD: ABNORMAL ML/MIN/{1.73_M2}
GFR SERPL CREATININE-BSD FRML MDRD: ABNORMAL ML/MIN/{1.73_M2}
GLUCOSE BLD-MCNC: 104 MG/DL (ref 70–99)
HCT VFR BLD CALC: 35.9 % (ref 36–46)
HEMOGLOBIN: 12.1 G/DL (ref 12–16)
MAGNESIUM: 1.8 MG/DL (ref 1.6–2.6)
MCH RBC QN AUTO: 33.8 PG (ref 26–34)
MCHC RBC AUTO-ENTMCNC: 33.6 G/DL (ref 31–37)
MCV RBC AUTO: 100.4 FL (ref 80–100)
NRBC AUTOMATED: ABNORMAL PER 100 WBC
PDW BLD-RTO: 13.3 % (ref 11–14.5)
PLATELET # BLD: 215 K/UL (ref 140–450)
PMV BLD AUTO: 8.3 FL (ref 6–12)
POTASSIUM SERPL-SCNC: 3.2 MMOL/L (ref 3.7–5.3)
RBC # BLD: 3.58 M/UL (ref 4–5.2)
SODIUM BLD-SCNC: 136 MMOL/L (ref 135–144)
WBC # BLD: 6.7 K/UL (ref 3.5–11)

## 2019-06-30 PROCEDURE — 2060000000 HC ICU INTERMEDIATE R&B

## 2019-06-30 PROCEDURE — 6370000000 HC RX 637 (ALT 250 FOR IP): Performed by: INTERNAL MEDICINE

## 2019-06-30 PROCEDURE — 83735 ASSAY OF MAGNESIUM: CPT

## 2019-06-30 PROCEDURE — 94640 AIRWAY INHALATION TREATMENT: CPT

## 2019-06-30 PROCEDURE — 6360000002 HC RX W HCPCS: Performed by: NURSE PRACTITIONER

## 2019-06-30 PROCEDURE — 71046 X-RAY EXAM CHEST 2 VIEWS: CPT

## 2019-06-30 PROCEDURE — 80048 BASIC METABOLIC PNL TOTAL CA: CPT

## 2019-06-30 PROCEDURE — 85027 COMPLETE CBC AUTOMATED: CPT

## 2019-06-30 PROCEDURE — 36415 COLL VENOUS BLD VENIPUNCTURE: CPT

## 2019-06-30 PROCEDURE — 6370000000 HC RX 637 (ALT 250 FOR IP)

## 2019-06-30 PROCEDURE — 99223 1ST HOSP IP/OBS HIGH 75: CPT | Performed by: INTERNAL MEDICINE

## 2019-06-30 PROCEDURE — 94669 MECHANICAL CHEST WALL OSCILL: CPT

## 2019-06-30 PROCEDURE — 6360000002 HC RX W HCPCS

## 2019-06-30 PROCEDURE — 6370000000 HC RX 637 (ALT 250 FOR IP): Performed by: NURSE PRACTITIONER

## 2019-06-30 PROCEDURE — 94761 N-INVAS EAR/PLS OXIMETRY MLT: CPT

## 2019-06-30 PROCEDURE — 2580000003 HC RX 258: Performed by: NURSE PRACTITIONER

## 2019-06-30 RX ORDER — KETOROLAC TROMETHAMINE 30 MG/ML
30 INJECTION, SOLUTION INTRAMUSCULAR; INTRAVENOUS EVERY 6 HOURS PRN
Status: DISCONTINUED | OUTPATIENT
Start: 2019-06-30 | End: 2019-07-02 | Stop reason: HOSPADM

## 2019-06-30 RX ORDER — KETOROLAC TROMETHAMINE 30 MG/ML
INJECTION, SOLUTION INTRAMUSCULAR; INTRAVENOUS
Status: COMPLETED
Start: 2019-06-30 | End: 2019-06-30

## 2019-06-30 RX ORDER — GUAIFENESIN 100 MG/5ML
SOLUTION ORAL
Status: COMPLETED
Start: 2019-06-30 | End: 2019-06-30

## 2019-06-30 RX ORDER — GUAIFENESIN/DEXTROMETHORPHAN 100-10MG/5
5 SYRUP ORAL EVERY 4 HOURS PRN
Status: DISCONTINUED | OUTPATIENT
Start: 2019-06-30 | End: 2019-07-02 | Stop reason: HOSPADM

## 2019-06-30 RX ORDER — IPRATROPIUM BROMIDE AND ALBUTEROL SULFATE 2.5; .5 MG/3ML; MG/3ML
1 SOLUTION RESPIRATORY (INHALATION) 4 TIMES DAILY
Status: DISCONTINUED | OUTPATIENT
Start: 2019-06-30 | End: 2019-07-02 | Stop reason: HOSPADM

## 2019-06-30 RX ORDER — POTASSIUM CHLORIDE 20 MEQ/1
40 TABLET, EXTENDED RELEASE ORAL ONCE
Status: COMPLETED | OUTPATIENT
Start: 2019-06-30 | End: 2019-06-30

## 2019-06-30 RX ORDER — GUAIFENESIN 600 MG/1
600 TABLET, EXTENDED RELEASE ORAL 2 TIMES DAILY
Status: DISCONTINUED | OUTPATIENT
Start: 2019-06-30 | End: 2019-07-02 | Stop reason: HOSPADM

## 2019-06-30 RX ADMIN — IPRATROPIUM BROMIDE AND ALBUTEROL SULFATE 1 AMPULE: .5; 3 SOLUTION RESPIRATORY (INHALATION) at 07:28

## 2019-06-30 RX ADMIN — ACETAMINOPHEN 650 MG: 325 TABLET ORAL at 03:19

## 2019-06-30 RX ADMIN — ACETAMINOPHEN 650 MG: 325 TABLET ORAL at 21:13

## 2019-06-30 RX ADMIN — KETOROLAC TROMETHAMINE 30 MG: 30 INJECTION, SOLUTION INTRAMUSCULAR at 05:10

## 2019-06-30 RX ADMIN — GUAIFENESIN 600 MG: 600 TABLET, EXTENDED RELEASE ORAL at 20:00

## 2019-06-30 RX ADMIN — AZITHROMYCIN MONOHYDRATE 500 MG: 500 INJECTION, POWDER, LYOPHILIZED, FOR SOLUTION INTRAVENOUS at 17:51

## 2019-06-30 RX ADMIN — ONDANSETRON 4 MG: 2 INJECTION INTRAMUSCULAR; INTRAVENOUS at 23:57

## 2019-06-30 RX ADMIN — IPRATROPIUM BROMIDE AND ALBUTEROL SULFATE 1 AMPULE: .5; 3 SOLUTION RESPIRATORY (INHALATION) at 15:28

## 2019-06-30 RX ADMIN — FOLIC ACID 1 MG: 1 TABLET ORAL at 08:08

## 2019-06-30 RX ADMIN — ACETAMINOPHEN 650 MG: 325 TABLET ORAL at 16:52

## 2019-06-30 RX ADMIN — KETOROLAC TROMETHAMINE 30 MG: 30 INJECTION, SOLUTION INTRAMUSCULAR at 23:58

## 2019-06-30 RX ADMIN — ACETAMINOPHEN 650 MG: 325 TABLET ORAL at 08:13

## 2019-06-30 RX ADMIN — GUAIFENESIN 600 MG: 600 TABLET, EXTENDED RELEASE ORAL at 11:50

## 2019-06-30 RX ADMIN — CEFTRIAXONE 1 G: 1 INJECTION, POWDER, FOR SOLUTION INTRAMUSCULAR; INTRAVENOUS at 17:50

## 2019-06-30 RX ADMIN — Medication 10 ML: at 05:13

## 2019-06-30 RX ADMIN — VITAMIN D 1000 UNITS: 25 TAB ORAL at 08:08

## 2019-06-30 RX ADMIN — KETOROLAC TROMETHAMINE 30 MG: 30 INJECTION, SOLUTION INTRAMUSCULAR at 11:51

## 2019-06-30 RX ADMIN — GUAIFENESIN 10 ML: 200 SOLUTION ORAL at 05:09

## 2019-06-30 RX ADMIN — IPRATROPIUM BROMIDE AND ALBUTEROL SULFATE 1 AMPULE: .5; 3 SOLUTION RESPIRATORY (INHALATION) at 20:36

## 2019-06-30 RX ADMIN — SODIUM CHLORIDE, PRESERVATIVE FREE 10 ML: 5 INJECTION INTRAVENOUS at 20:00

## 2019-06-30 RX ADMIN — KETOROLAC TROMETHAMINE 30 MG: 30 INJECTION, SOLUTION INTRAMUSCULAR at 17:51

## 2019-06-30 RX ADMIN — PAROXETINE HYDROCHLORIDE 40 MG: 20 TABLET, FILM COATED ORAL at 08:08

## 2019-06-30 RX ADMIN — SODIUM CHLORIDE: 9 INJECTION, SOLUTION INTRAVENOUS at 11:50

## 2019-06-30 RX ADMIN — POTASSIUM CHLORIDE 40 MEQ: 20 TABLET, EXTENDED RELEASE ORAL at 08:08

## 2019-06-30 RX ADMIN — PREDNISONE 10 MG: 10 TABLET ORAL at 08:08

## 2019-06-30 RX ADMIN — ENOXAPARIN SODIUM 40 MG: 40 INJECTION SUBCUTANEOUS at 08:09

## 2019-06-30 RX ADMIN — ALBUTEROL SULFATE 2.5 MG: 2.5 SOLUTION RESPIRATORY (INHALATION) at 04:56

## 2019-06-30 RX ADMIN — IPRATROPIUM BROMIDE AND ALBUTEROL SULFATE 1 AMPULE: .5; 3 SOLUTION RESPIRATORY (INHALATION) at 11:14

## 2019-06-30 RX ADMIN — PANTOPRAZOLE SODIUM 40 MG: 40 TABLET, DELAYED RELEASE ORAL at 05:11

## 2019-06-30 ASSESSMENT — PAIN DESCRIPTION - FREQUENCY
FREQUENCY: CONTINUOUS

## 2019-06-30 ASSESSMENT — PAIN DESCRIPTION - DESCRIPTORS
DESCRIPTORS: ACHING
DESCRIPTORS: HEADACHE
DESCRIPTORS: HEADACHE
DESCRIPTORS: HEADACHE;THROBBING
DESCRIPTORS: HEADACHE
DESCRIPTORS: HEADACHE

## 2019-06-30 ASSESSMENT — PAIN SCALES - GENERAL
PAINLEVEL_OUTOF10: 0
PAINLEVEL_OUTOF10: 4
PAINLEVEL_OUTOF10: 6
PAINLEVEL_OUTOF10: 0
PAINLEVEL_OUTOF10: 0
PAINLEVEL_OUTOF10: 3
PAINLEVEL_OUTOF10: 2
PAINLEVEL_OUTOF10: 8
PAINLEVEL_OUTOF10: 0
PAINLEVEL_OUTOF10: 3
PAINLEVEL_OUTOF10: 5
PAINLEVEL_OUTOF10: 9
PAINLEVEL_OUTOF10: 10

## 2019-06-30 ASSESSMENT — PAIN DESCRIPTION - PAIN TYPE
TYPE: ACUTE PAIN

## 2019-06-30 ASSESSMENT — PAIN DESCRIPTION - PROGRESSION
CLINICAL_PROGRESSION: NOT CHANGED

## 2019-06-30 ASSESSMENT — PAIN DESCRIPTION - LOCATION
LOCATION: HEAD
LOCATION: GENERALIZED
LOCATION: HEAD

## 2019-06-30 ASSESSMENT — PAIN DESCRIPTION - ONSET
ONSET: ON-GOING

## 2019-07-01 ENCOUNTER — APPOINTMENT (OUTPATIENT)
Dept: GENERAL RADIOLOGY | Age: 54
DRG: 195 | End: 2019-07-01
Payer: COMMERCIAL

## 2019-07-01 LAB
ABSOLUTE EOS #: 0.06 K/UL (ref 0–0.4)
ABSOLUTE IMMATURE GRANULOCYTE: ABNORMAL K/UL (ref 0–0.3)
ABSOLUTE LYMPH #: 1.37 K/UL (ref 1–4.8)
ABSOLUTE MONO #: 0.57 K/UL (ref 0.1–1.2)
ANION GAP SERPL CALCULATED.3IONS-SCNC: 13 MMOL/L (ref 9–17)
BASOPHILS # BLD: 1 % (ref 0–1)
BASOPHILS ABSOLUTE: 0.06 K/UL (ref 0–0.2)
BUN BLDV-MCNC: 10 MG/DL (ref 6–20)
BUN/CREAT BLD: 21 (ref 9–20)
CALCIUM SERPL-MCNC: 9 MG/DL (ref 8.6–10.4)
CHLORIDE BLD-SCNC: 103 MMOL/L (ref 98–107)
CO2: 22 MMOL/L (ref 20–31)
CREAT SERPL-MCNC: 0.47 MG/DL (ref 0.5–0.9)
DIFFERENTIAL TYPE: ABNORMAL
EOSINOPHILS RELATIVE PERCENT: 1 % (ref 1–7)
GFR AFRICAN AMERICAN: >60 ML/MIN
GFR NON-AFRICAN AMERICAN: >60 ML/MIN
GFR SERPL CREATININE-BSD FRML MDRD: ABNORMAL ML/MIN/{1.73_M2}
GFR SERPL CREATININE-BSD FRML MDRD: ABNORMAL ML/MIN/{1.73_M2}
GLUCOSE BLD-MCNC: 102 MG/DL (ref 70–99)
HCT VFR BLD CALC: 31.8 % (ref 36–46)
HEMOGLOBIN: 10.7 G/DL (ref 12–16)
IMMATURE GRANULOCYTES: ABNORMAL %
LYMPHOCYTES # BLD: 24 % (ref 16–46)
MCH RBC QN AUTO: 33.7 PG (ref 26–34)
MCHC RBC AUTO-ENTMCNC: 33.5 G/DL (ref 31–37)
MCV RBC AUTO: 100.4 FL (ref 80–100)
MONOCYTES # BLD: 10 % (ref 4–11)
MORPHOLOGY: ABNORMAL
MORPHOLOGY: ABNORMAL
NRBC AUTOMATED: ABNORMAL PER 100 WBC
PDW BLD-RTO: 13.8 % (ref 11–14.5)
PLATELET # BLD: 217 K/UL (ref 140–450)
PLATELET ESTIMATE: ABNORMAL
PMV BLD AUTO: 8.8 FL (ref 6–12)
POTASSIUM SERPL-SCNC: 3.1 MMOL/L (ref 3.7–5.3)
RBC # BLD: 3.17 M/UL (ref 4–5.2)
RBC # BLD: ABNORMAL 10*6/UL
SEG NEUTROPHILS: 64 % (ref 43–77)
SEGMENTED NEUTROPHILS ABSOLUTE COUNT: 3.64 K/UL (ref 1.8–7.7)
SODIUM BLD-SCNC: 138 MMOL/L (ref 135–144)
WBC # BLD: 5.7 K/UL (ref 3.5–11)
WBC # BLD: ABNORMAL 10*3/UL

## 2019-07-01 PROCEDURE — 2060000000 HC ICU INTERMEDIATE R&B

## 2019-07-01 PROCEDURE — 2580000003 HC RX 258: Performed by: NURSE PRACTITIONER

## 2019-07-01 PROCEDURE — 6370000000 HC RX 637 (ALT 250 FOR IP): Performed by: INTERNAL MEDICINE

## 2019-07-01 PROCEDURE — 80048 BASIC METABOLIC PNL TOTAL CA: CPT

## 2019-07-01 PROCEDURE — 6370000000 HC RX 637 (ALT 250 FOR IP): Performed by: NURSE PRACTITIONER

## 2019-07-01 PROCEDURE — 94664 DEMO&/EVAL PT USE INHALER: CPT

## 2019-07-01 PROCEDURE — 85025 COMPLETE CBC W/AUTO DIFF WBC: CPT

## 2019-07-01 PROCEDURE — 99232 SBSQ HOSP IP/OBS MODERATE 35: CPT | Performed by: INTERNAL MEDICINE

## 2019-07-01 PROCEDURE — 71046 X-RAY EXAM CHEST 2 VIEWS: CPT

## 2019-07-01 PROCEDURE — 94640 AIRWAY INHALATION TREATMENT: CPT

## 2019-07-01 PROCEDURE — 6360000002 HC RX W HCPCS: Performed by: NURSE PRACTITIONER

## 2019-07-01 PROCEDURE — 36415 COLL VENOUS BLD VENIPUNCTURE: CPT

## 2019-07-01 PROCEDURE — 94150 VITAL CAPACITY TEST: CPT

## 2019-07-01 PROCEDURE — 94669 MECHANICAL CHEST WALL OSCILL: CPT

## 2019-07-01 PROCEDURE — 94760 N-INVAS EAR/PLS OXIMETRY 1: CPT

## 2019-07-01 RX ORDER — POTASSIUM CHLORIDE 20 MEQ/1
40 TABLET, EXTENDED RELEASE ORAL ONCE
Status: COMPLETED | OUTPATIENT
Start: 2019-07-01 | End: 2019-07-01

## 2019-07-01 RX ORDER — BUTALBITAL, ACETAMINOPHEN AND CAFFEINE 50; 325; 40 MG/1; MG/1; MG/1
2 TABLET ORAL EVERY 4 HOURS PRN
Status: DISCONTINUED | OUTPATIENT
Start: 2019-07-01 | End: 2019-07-02 | Stop reason: HOSPADM

## 2019-07-01 RX ORDER — HYDROCODONE BITARTRATE AND ACETAMINOPHEN 5; 325 MG/1; MG/1
1 TABLET ORAL ONCE
Status: COMPLETED | OUTPATIENT
Start: 2019-07-01 | End: 2019-07-01

## 2019-07-01 RX ORDER — BUTALBITAL, ACETAMINOPHEN AND CAFFEINE 50; 325; 40 MG/1; MG/1; MG/1
1 TABLET ORAL EVERY 4 HOURS PRN
Status: DISCONTINUED | OUTPATIENT
Start: 2019-07-01 | End: 2019-07-02 | Stop reason: HOSPADM

## 2019-07-01 RX ADMIN — AZITHROMYCIN MONOHYDRATE 500 MG: 500 INJECTION, POWDER, LYOPHILIZED, FOR SOLUTION INTRAVENOUS at 18:06

## 2019-07-01 RX ADMIN — IPRATROPIUM BROMIDE AND ALBUTEROL SULFATE 1 AMPULE: .5; 3 SOLUTION RESPIRATORY (INHALATION) at 19:41

## 2019-07-01 RX ADMIN — SODIUM CHLORIDE, PRESERVATIVE FREE 10 ML: 5 INJECTION INTRAVENOUS at 21:00

## 2019-07-01 RX ADMIN — IPRATROPIUM BROMIDE AND ALBUTEROL SULFATE 1 AMPULE: .5; 3 SOLUTION RESPIRATORY (INHALATION) at 15:50

## 2019-07-01 RX ADMIN — HYDROCODONE BITARTRATE AND ACETAMINOPHEN 1 TABLET: 5; 325 TABLET ORAL at 04:50

## 2019-07-01 RX ADMIN — POTASSIUM CHLORIDE 40 MEQ: 20 TABLET, EXTENDED RELEASE ORAL at 10:58

## 2019-07-01 RX ADMIN — CEFTRIAXONE 1 G: 1 INJECTION, POWDER, FOR SOLUTION INTRAMUSCULAR; INTRAVENOUS at 18:06

## 2019-07-01 RX ADMIN — FOLIC ACID 1 MG: 1 TABLET ORAL at 08:25

## 2019-07-01 RX ADMIN — GUAIFENESIN 600 MG: 600 TABLET, EXTENDED RELEASE ORAL at 21:00

## 2019-07-01 RX ADMIN — IPRATROPIUM BROMIDE AND ALBUTEROL SULFATE 1 AMPULE: .5; 3 SOLUTION RESPIRATORY (INHALATION) at 11:49

## 2019-07-01 RX ADMIN — PREDNISONE 10 MG: 10 TABLET ORAL at 08:24

## 2019-07-01 RX ADMIN — SODIUM CHLORIDE: 9 INJECTION, SOLUTION INTRAVENOUS at 00:00

## 2019-07-01 RX ADMIN — BUTALBITAL, ACETAMINOPHEN, AND CAFFEINE 2 TABLET: 50; 325; 40 TABLET ORAL at 10:58

## 2019-07-01 RX ADMIN — GUAIFENESIN 600 MG: 600 TABLET, EXTENDED RELEASE ORAL at 08:25

## 2019-07-01 RX ADMIN — IPRATROPIUM BROMIDE AND ALBUTEROL SULFATE 1 AMPULE: .5; 3 SOLUTION RESPIRATORY (INHALATION) at 08:23

## 2019-07-01 RX ADMIN — PAROXETINE HYDROCHLORIDE 40 MG: 20 TABLET, FILM COATED ORAL at 08:24

## 2019-07-01 RX ADMIN — BUTALBITAL, ACETAMINOPHEN, AND CAFFEINE 2 TABLET: 50; 325; 40 TABLET ORAL at 16:08

## 2019-07-01 RX ADMIN — ENOXAPARIN SODIUM 40 MG: 40 INJECTION SUBCUTANEOUS at 08:25

## 2019-07-01 RX ADMIN — ACETAMINOPHEN 650 MG: 325 TABLET ORAL at 09:01

## 2019-07-01 RX ADMIN — BUTALBITAL, ACETAMINOPHEN, AND CAFFEINE 2 TABLET: 50; 325; 40 TABLET ORAL at 21:00

## 2019-07-01 RX ADMIN — PANTOPRAZOLE SODIUM 40 MG: 40 TABLET, DELAYED RELEASE ORAL at 04:58

## 2019-07-01 ASSESSMENT — PAIN DESCRIPTION - LOCATION
LOCATION: HEAD

## 2019-07-01 ASSESSMENT — PAIN DESCRIPTION - FREQUENCY
FREQUENCY: CONTINUOUS
FREQUENCY: CONTINUOUS

## 2019-07-01 ASSESSMENT — PAIN DESCRIPTION - PAIN TYPE
TYPE: ACUTE PAIN

## 2019-07-01 ASSESSMENT — PAIN SCALES - GENERAL
PAINLEVEL_OUTOF10: 10
PAINLEVEL_OUTOF10: 0
PAINLEVEL_OUTOF10: 3
PAINLEVEL_OUTOF10: 0
PAINLEVEL_OUTOF10: 6
PAINLEVEL_OUTOF10: 0
PAINLEVEL_OUTOF10: 6
PAINLEVEL_OUTOF10: 0
PAINLEVEL_OUTOF10: 0

## 2019-07-01 ASSESSMENT — PAIN DESCRIPTION - ONSET
ONSET: ON-GOING
ONSET: ON-GOING

## 2019-07-01 ASSESSMENT — PAIN DESCRIPTION - PROGRESSION
CLINICAL_PROGRESSION: NOT CHANGED
CLINICAL_PROGRESSION: NOT CHANGED

## 2019-07-01 ASSESSMENT — PAIN DESCRIPTION - DESCRIPTORS
DESCRIPTORS: HEADACHE;POUNDING
DESCRIPTORS: HEADACHE

## 2019-07-01 NOTE — FLOWSHEET NOTE
Assessment: Patient is well supported by family. Declined call to Muslim. No spiritual concerns expressed. Intervention: Engaged in conversation. Plan: Chaplains remain available for spiritual and emotional support. 07/01/19 1237   Encounter Summary   Services provided to: Patient and family together   Referral/Consult From: [facility] Family members; Latter-day/doris community   Continue Visiting   (7/1/19)   Complexity of Encounter Low   Length of Encounter 15 minutes   Routine   Type Initial   Assessment Approachable   Intervention Sustaining presence/ Ministry of presence   Outcome Expressed gratitude;Engaged in conversation

## 2019-07-02 ENCOUNTER — TELEPHONE (OUTPATIENT)
Dept: FAMILY MEDICINE CLINIC | Age: 54
End: 2019-07-02

## 2019-07-02 ENCOUNTER — APPOINTMENT (OUTPATIENT)
Dept: GENERAL RADIOLOGY | Age: 54
DRG: 195 | End: 2019-07-02
Payer: COMMERCIAL

## 2019-07-02 VITALS
WEIGHT: 178.1 LBS | HEART RATE: 82 BPM | TEMPERATURE: 98.7 F | HEIGHT: 63 IN | OXYGEN SATURATION: 95 % | RESPIRATION RATE: 16 BRPM | BODY MASS INDEX: 31.55 KG/M2 | SYSTOLIC BLOOD PRESSURE: 133 MMHG | DIASTOLIC BLOOD PRESSURE: 85 MMHG

## 2019-07-02 LAB
ABSOLUTE EOS #: 0.1 K/UL (ref 0–0.4)
ABSOLUTE IMMATURE GRANULOCYTE: ABNORMAL K/UL (ref 0–0.3)
ABSOLUTE LYMPH #: 2.3 K/UL (ref 1–4.8)
ABSOLUTE MONO #: 1.1 K/UL (ref 0.1–1.2)
ANION GAP SERPL CALCULATED.3IONS-SCNC: 13 MMOL/L (ref 9–17)
BASOPHILS # BLD: 1 % (ref 0–1)
BASOPHILS ABSOLUTE: 0.1 K/UL (ref 0–0.2)
BUN BLDV-MCNC: 6 MG/DL (ref 6–20)
BUN/CREAT BLD: 13 (ref 9–20)
CALCIUM SERPL-MCNC: 9.4 MG/DL (ref 8.6–10.4)
CHLORIDE BLD-SCNC: 105 MMOL/L (ref 98–107)
CO2: 24 MMOL/L (ref 20–31)
CREAT SERPL-MCNC: 0.48 MG/DL (ref 0.5–0.9)
DIFFERENTIAL TYPE: ABNORMAL
EOSINOPHILS RELATIVE PERCENT: 2 % (ref 1–7)
GFR AFRICAN AMERICAN: >60 ML/MIN
GFR NON-AFRICAN AMERICAN: >60 ML/MIN
GFR SERPL CREATININE-BSD FRML MDRD: ABNORMAL ML/MIN/{1.73_M2}
GFR SERPL CREATININE-BSD FRML MDRD: ABNORMAL ML/MIN/{1.73_M2}
GLUCOSE BLD-MCNC: 93 MG/DL (ref 70–99)
HCT VFR BLD CALC: 33.9 % (ref 36–46)
HEMOGLOBIN: 11.4 G/DL (ref 12–16)
IMMATURE GRANULOCYTES: ABNORMAL %
LYMPHOCYTES # BLD: 31 % (ref 16–46)
MCH RBC QN AUTO: 33.9 PG (ref 26–34)
MCHC RBC AUTO-ENTMCNC: 33.8 G/DL (ref 31–37)
MCV RBC AUTO: 100.3 FL (ref 80–100)
MONOCYTES # BLD: 15 % (ref 4–11)
NRBC AUTOMATED: ABNORMAL PER 100 WBC
PDW BLD-RTO: 13.7 % (ref 11–14.5)
PLATELET # BLD: 282 K/UL (ref 140–450)
PLATELET ESTIMATE: ABNORMAL
PMV BLD AUTO: 7.9 FL (ref 6–12)
POTASSIUM SERPL-SCNC: 3.3 MMOL/L (ref 3.7–5.3)
RBC # BLD: 3.38 M/UL (ref 4–5.2)
RBC # BLD: ABNORMAL 10*6/UL
SEG NEUTROPHILS: 51 % (ref 43–77)
SEGMENTED NEUTROPHILS ABSOLUTE COUNT: 3.7 K/UL (ref 1.8–7.7)
SODIUM BLD-SCNC: 142 MMOL/L (ref 135–144)
WBC # BLD: 7.2 K/UL (ref 3.5–11)
WBC # BLD: ABNORMAL 10*3/UL

## 2019-07-02 PROCEDURE — 6370000000 HC RX 637 (ALT 250 FOR IP): Performed by: NURSE PRACTITIONER

## 2019-07-02 PROCEDURE — 94669 MECHANICAL CHEST WALL OSCILL: CPT

## 2019-07-02 PROCEDURE — 94640 AIRWAY INHALATION TREATMENT: CPT

## 2019-07-02 PROCEDURE — 6370000000 HC RX 637 (ALT 250 FOR IP): Performed by: INTERNAL MEDICINE

## 2019-07-02 PROCEDURE — 80048 BASIC METABOLIC PNL TOTAL CA: CPT

## 2019-07-02 PROCEDURE — 71046 X-RAY EXAM CHEST 2 VIEWS: CPT

## 2019-07-02 PROCEDURE — 6360000002 HC RX W HCPCS: Performed by: NURSE PRACTITIONER

## 2019-07-02 PROCEDURE — 94760 N-INVAS EAR/PLS OXIMETRY 1: CPT

## 2019-07-02 PROCEDURE — 36415 COLL VENOUS BLD VENIPUNCTURE: CPT

## 2019-07-02 PROCEDURE — 2580000003 HC RX 258: Performed by: NURSE PRACTITIONER

## 2019-07-02 PROCEDURE — 85025 COMPLETE CBC W/AUTO DIFF WBC: CPT

## 2019-07-02 PROCEDURE — 99238 HOSP IP/OBS DSCHRG MGMT 30/<: CPT | Performed by: INTERNAL MEDICINE

## 2019-07-02 RX ORDER — SACCHAROMYCES BOULARDII 250 MG
250 CAPSULE ORAL 2 TIMES DAILY
Qty: 20 CAPSULE | Refills: 0 | Status: SHIPPED | OUTPATIENT
Start: 2019-07-02 | End: 2019-07-12

## 2019-07-02 RX ORDER — BUTALBITAL, ACETAMINOPHEN AND CAFFEINE 50; 325; 40 MG/1; MG/1; MG/1
1 TABLET ORAL EVERY 6 HOURS PRN
Qty: 10 TABLET | Refills: 0 | Status: SHIPPED | OUTPATIENT
Start: 2019-07-02 | End: 2021-10-07

## 2019-07-02 RX ORDER — NICOTINE 21 MG/24HR
1 PATCH, TRANSDERMAL 24 HOURS TRANSDERMAL EVERY 24 HOURS
Qty: 30 PATCH | Refills: 0 | COMMUNITY
Start: 2019-07-02 | End: 2019-07-05 | Stop reason: ALTCHOICE

## 2019-07-02 RX ORDER — CEFDINIR 300 MG/1
300 CAPSULE ORAL 2 TIMES DAILY
Qty: 8 CAPSULE | Refills: 0 | Status: SHIPPED | OUTPATIENT
Start: 2019-07-02 | End: 2019-07-06

## 2019-07-02 RX ORDER — AZITHROMYCIN 500 MG/1
500 TABLET, FILM COATED ORAL DAILY
Qty: 4 TABLET | Refills: 0 | Status: SHIPPED | OUTPATIENT
Start: 2019-07-02 | End: 2019-07-06

## 2019-07-02 RX ORDER — GREEN TEA/HOODIA GORDONII 315-12.5MG
1 CAPSULE ORAL 3 TIMES DAILY
Qty: 30 TABLET | Refills: 0 | Status: SHIPPED | OUTPATIENT
Start: 2019-07-02 | End: 2019-07-12

## 2019-07-02 RX ADMIN — VITAMIN D 1000 UNITS: 25 TAB ORAL at 09:07

## 2019-07-02 RX ADMIN — SODIUM CHLORIDE, PRESERVATIVE FREE 10 ML: 5 INJECTION INTRAVENOUS at 09:08

## 2019-07-02 RX ADMIN — FOLIC ACID 1 MG: 1 TABLET ORAL at 09:07

## 2019-07-02 RX ADMIN — Medication 10 ML: at 03:20

## 2019-07-02 RX ADMIN — IPRATROPIUM BROMIDE AND ALBUTEROL SULFATE 1 AMPULE: .5; 3 SOLUTION RESPIRATORY (INHALATION) at 08:22

## 2019-07-02 RX ADMIN — GUAIFENESIN 600 MG: 600 TABLET, EXTENDED RELEASE ORAL at 09:07

## 2019-07-02 RX ADMIN — PAROXETINE HYDROCHLORIDE 40 MG: 20 TABLET, FILM COATED ORAL at 09:07

## 2019-07-02 RX ADMIN — ONDANSETRON 4 MG: 2 INJECTION INTRAMUSCULAR; INTRAVENOUS at 03:20

## 2019-07-02 RX ADMIN — ENOXAPARIN SODIUM 40 MG: 40 INJECTION SUBCUTANEOUS at 09:06

## 2019-07-02 RX ADMIN — POTASSIUM BICARBONATE 40 MEQ: 782 TABLET, EFFERVESCENT ORAL at 09:06

## 2019-07-02 RX ADMIN — BUTALBITAL, ACETAMINOPHEN, AND CAFFEINE 2 TABLET: 50; 325; 40 TABLET ORAL at 03:16

## 2019-07-02 RX ADMIN — PANTOPRAZOLE SODIUM 40 MG: 40 TABLET, DELAYED RELEASE ORAL at 06:07

## 2019-07-02 RX ADMIN — PREDNISONE 10 MG: 10 TABLET ORAL at 09:07

## 2019-07-02 ASSESSMENT — PAIN SCALES - GENERAL
PAINLEVEL_OUTOF10: 0
PAINLEVEL_OUTOF10: 7
PAINLEVEL_OUTOF10: 0

## 2019-07-02 ASSESSMENT — PAIN DESCRIPTION - DESCRIPTORS: DESCRIPTORS: ACHING;HEADACHE

## 2019-07-02 ASSESSMENT — PAIN DESCRIPTION - PAIN TYPE: TYPE: ACUTE PAIN

## 2019-07-02 NOTE — PROGRESS NOTES
[facility] [J18.9] . Vitals:    07/01/19 1604   BP: 139/73   Pulse: 86   Resp: 18   Temp: 99.7 °F (37.6 °C)   SpO2: 98%   . Patients home meds are   Prior to Admission medications    Medication Sig Start Date End Date Taking?  Authorizing Provider   esomeprazole (NEXIUM) 40 MG delayed release capsule TAKE 1 CAPSULE EVERY       MORNING BEFORE BREAKFAST 8/1/18  Yes Aixa Brambila MD   PARoxetine (PAXIL) 40 MG tablet TAKE 1 TABLET EVERY MORNING 8/1/18  Yes Aixa Brambila MD   predniSONE (DELTASONE) 10 MG tablet Take 1 tablet by mouth daily 2/22/18  Yes Historical Provider, MD   mometasone (ASMANEX 30 METERED DOSES) 220 MCG/INH inhaler Inhale 2 puffs into the lungs daily 12/14/17  Yes Kaur Orellana MD   naproxen (NAPROSYN) 500 MG tablet  10/27/16  Yes Historical Provider, MD   Adalimumab (HUMIRA SC) Inject into the skin   Yes Historical Provider, MD   methotrexate (RHEUMATREX) 2.5 MG chemo tablet Take by mouth once a week   Yes Historical Provider, MD   albuterol sulfate HFA (VENTOLIN HFA) 108 (90 BASE) MCG/ACT inhaler Inhale 2 puffs into the lungs every 4 hours as needed for Wheezing or Shortness of Breath 4/30/17  Yes Miles Moore MD   folic acid (FOLVITE) 1 MG tablet Take 1 mg by mouth daily   Yes Historical Provider, MD   vitamin D (CHOLECALCIFEROL) 1000 UNIT TABS tablet Take 1,000 Units by mouth daily   Yes Historical Provider, MD   atorvastatin (LIPITOR) 20 MG tablet TAKE 1 TABLET DAILY 9/27/18   Aixa Brambila MD   furosemide (LASIX) 20 MG tablet TAKE 1 TABLET DAILY AS     NEEDED FOR EDEMA 5/3/18   Aixa Brambila MD   tiZANidine (ZANAFLEX) 2 MG tablet  11/21/17   Historical Provider, MD   .  Recent Surgical History: None = 0     Assessment     Peak Flow (asthma only)    Predicted: 344  Personal Best: 151    % Predicted 44%  Peak Flow : Less than 50% = 4    FEV1/FVC    FEV1 Predicted 2.66      FEV1 1.5    FEV1 % Predicted 56%  FVC 2.0  IS volume
Incentive Spirometry education and demonstration given by Respiratory Therapy. Pt achieving 1500 mL at time of instruction. Incentive Spirometer left at bedside and   Patient instructed to do a minimum of 10 breaths every hour.       [address] [facility]  5:26 AM
Occupational Therapy      Patient declined OT services, stating they are at their baseline level of function. Patient notes they are independent with functional mobility, toileting and simple ADLs. Patient discharged per their request. Patient was advised to inform staff if there is a decline in function or if they have any questions, and therapy can return at that time. Patient verbalized understanding.
07/01/2019    K 3.1 07/01/2019     07/01/2019    CO2 22 07/01/2019    BUN 10 07/01/2019    LABALBU 4.2 04/11/2019    CREATININE 0.47 07/01/2019    CALCIUM 9.0 07/01/2019    GFRAA >60 07/01/2019    LABGLOM >60 07/01/2019    GLUCOSE 102 07/01/2019           Physical Exam:  Vitals: BP (!) 180/97   Pulse 96   Temp 99.3 °F (37.4 °C) (Tympanic)   Resp 16   Ht 5' 3\" (1.6 m) Comment: [id number]  Wt 176 lb 1.6 oz (79.9 kg)   LMP 01/18/2018 (Within Months)   SpO2 94%   BMI 31.19 kg/m²   24 hour intake/output:    Intake/Output Summary (Last 24 hours) at 7/1/2019 1153  Last data filed at 7/1/2019 0453  Gross per 24 hour   Intake 2940 ml   Output --   Net 2940 ml     Last 3 weights: Wt Readings from Last 3 Encounters:   07/01/19 176 lb 1.6 oz (79.9 kg)   04/03/19 183 lb (83 kg)   02/12/19 183 lb (83 kg)     HEENT: Normocephalic and Atraumatic  Neck: Supple, No Masses, Tenderness, Nodularity and No Lymphadenopathy  Chest/Lungs: Clear to Auscultation without Rales, Rhonchi, or Wheezes  Cardiac: Regular Rate and Rhythm  GI/Abdomen: Bowel Sounds Present and Soft, Non-tender, without Guarding or Rebound Tenderness  : Not examined  EXT/Skin: No Edema, No Cyanosis and No Clubbing  Neuro: Alert and Oriented, No Localizing Signs/Symptoms and Cranial Nerves II-XII Grossly Intact      Assessment:    Active Problems:    Pneumonia due to organism  Resolved Problems:    * No resolved hospital problems. *    NORY HURLEY md     NAME ALERT 1965   [facility]  [address]  [phone]  [phone]  WF  [place]]  FULL CODE     LOVENOX     Anti-infectives:  Rocephin IV, Zithromax IV    RUL pneumonia----6.29.2019             CXR---7. [id number] RUL infiltrate              CXR----6.30.3019----RUL pneumonia   Headache---6.29.2019---generalized body aches   GERD  Hyperlipidemia  Rheumatoid arthritis---immunosupressive therapy  Tobacco abuse---1 PPD x 30 years  Hypokalemia   PMH:  fibroid uterus, chronic pharyngitis, ОЛЬГА---sleep apnea,
Supple, No Masses, Tenderness, Nodularity and No Lymphadenopathy  Chest/Lungs: Clear to Auscultation without Rales, Rhonchi, or Wheezes  Cardiac: Regular Rate and Rhythm  GI/Abdomen: Bowel Sounds Present and Soft, Non-tender, without Guarding or Rebound Tenderness  : Not examined  EXT/Skin: No Edema, No Cyanosis and No Clubbing  Neuro: Alert and Oriented and No Localizing Signs/Symptoms      Assessment:    Active Problems:    Pneumonia due to organism  Resolved Problems:    * No resolved hospital problems. *    NORY HURLEY  [facility]     NAME ALERT 1965   [facility]  [address]  80  [phone]  [phone]  WF  Debo Danielle ]  FULL CODE     LOVENOX     Anti-infectives:  Rocephin IV, Zithromax IV    RUL pneumonia----6.29.2019             CXR---7. [id number] RUL infiltrate              CXR----6.30.3019----RUL pneumonia   Headache---6.29.2019---generalized body aches   GERD  Hyperlipidemia  Rheumatoid arthritis---immunosupressive therapy  Tobacco abuse---1 PPD x 30 years  Hypokalemia   PMH:  fibroid uterus, chronic pharyngitis, ОЛЬГА---sleep apnea, reactive              depression   PSH:  colonoscopy---polypectomy--2018, cholecystectomy,              laparoscopy--excision fibroids    Allergies:  NKDA   Patient received smoking cessation information during this hospitalization. Pt was given the Estrela 57 number. Smoking cessation medication patient was given at discharge:  Nicotine Patch    Reason no smoking cessation medication given Not Applicable        Plan:  1. Home---7. 2.2019  2. IV --> PO antibiotics--Omnicef--Zithromax  3. Tobacco abuse---see above  4. Probiotics  5. Headaches---Fioricet  6. Follow up Dr. Ramos Hong, [facility]  7.   See orders    Electronically signed by Juana Naik on 7/2/2019 at 6:48 PM    Hospitalist

## 2019-07-04 NOTE — DISCHARGE SUMMARY
tobacco abuse, 1 pack a day for 30 years. Advised to  quit absolutely and completely. The patient able to be discharged to home on 07/02/2019. LABORATORY DATA:  Around the time of discharge, white cell count 7.2,  hemoglobin 11.4, hematocrit 33.9, platelets 000,554. Sodium 142,  potassium 3.3 with additional 40 mEq of potassium being given, chloride  105, CO2 of 24, BUN 6, creatinine 0.48, glucose 93, calcium 9.4, GFR  greater than 60. DISCHARGE INSTRUCTIONS/FOLLOWUP:  Discharged to home on 07/02/2019. DIET:  Cardiac. ACTIVITY:  As tolerated. CONDITION AT DISCHARGE:  Improved. MEDICATIONS:  New:  Zithromax 500 mg p.o. daily for 4 days; Fioricet (butabarbital,  acetaminophen, caffeine) 50/325/40 one p.o. every 6 hours p.r.n.  headaches; cefdinir (Omnicef) 300 mg p.o. b.i.d.; nicotine 21 mg per  24-hour patch topical, change daily, advised may not smoke with the  patch in place; probiotic acidophilus 1 p.o. 3 times a day; Florastor  (Saccharomyces boulardii) 250 mg p.o. b.i.d. Following medications continued, no change:  Albuterol sulfate HFA  (Ventolin HFA) 2 puffs every 4 hours p.r.n. shortness of breath,  wheezing; Lipitor (atorvastatin) 20 mg p.o. daily; Nexium 40 mg p.o.  q.a.m.; folic acid 1 mg p.o. daily; Lasix (furosemide) 20 mg p.o. daily  p.r.n. lower extremity edema; Humira subcutaneously as directed;  methotrexate 2.5 mg tablets by mouth weekly; Asmanex (mometasone) 220  mcg 2 puffs daily; naproxen 500 mg daily p.r.n., advised not to be  taken; paroxetine (Paxil) 40 mg p.o. q.a.m., prednisone (Deltasone) 10  mg p.o. daily; Zanaflex (tizanidine) 2 mg p.o. q.8 hours p.r.n. muscle  spasm; vitamin D 1000 units p.o. daily. [facility] the [facility] Any aspect of the patient's care not discussed in the chart and/or  dictation will be addressed and treated as an outpatient.      The patient's medications

## 2019-07-05 ENCOUNTER — OFFICE VISIT (OUTPATIENT)
Dept: FAMILY MEDICINE CLINIC | Age: 54
End: 2019-07-05
Payer: COMMERCIAL

## 2019-07-05 VITALS
SYSTOLIC BLOOD PRESSURE: 128 MMHG | HEIGHT: 63 IN | TEMPERATURE: 98.6 F | OXYGEN SATURATION: 96 % | WEIGHT: 171 LBS | BODY MASS INDEX: 30.3 KG/M2 | HEART RATE: 68 BPM | DIASTOLIC BLOOD PRESSURE: 70 MMHG

## 2019-07-05 DIAGNOSIS — F32.9 REACTIVE DEPRESSION: ICD-10-CM

## 2019-07-05 DIAGNOSIS — J18.9 PNEUMONIA DUE TO ORGANISM: Primary | ICD-10-CM

## 2019-07-05 DIAGNOSIS — E87.6 HYPOKALEMIA: ICD-10-CM

## 2019-07-05 DIAGNOSIS — Z72.0 TOBACCO ABUSE: ICD-10-CM

## 2019-07-05 PROCEDURE — 99495 TRANSJ CARE MGMT MOD F2F 14D: CPT | Performed by: FAMILY MEDICINE

## 2019-07-05 PROCEDURE — 1111F DSCHRG MED/CURRENT MED MERGE: CPT | Performed by: FAMILY MEDICINE

## 2019-07-05 RX ORDER — PAROXETINE 10 MG/1
10 TABLET, FILM COATED ORAL EVERY MORNING
Qty: 90 TABLET | Refills: 3 | Status: SHIPPED | OUTPATIENT
Start: 2019-07-05 | End: 2020-06-18

## 2019-07-05 RX ORDER — ALBUTEROL SULFATE 90 UG/1
2 AEROSOL, METERED RESPIRATORY (INHALATION) EVERY 4 HOURS PRN
Qty: 1 INHALER | Refills: 0 | Status: SHIPPED | OUTPATIENT
Start: 2019-07-05

## 2019-07-05 RX ORDER — PAROXETINE HYDROCHLORIDE 40 MG/1
TABLET, FILM COATED ORAL
Qty: 90 TABLET | Refills: 3 | Status: SHIPPED | OUTPATIENT
Start: 2019-07-05 | End: 2020-07-20

## 2019-07-05 RX ORDER — POTASSIUM CHLORIDE 20 MEQ/1
20 TABLET, EXTENDED RELEASE ORAL DAILY
Qty: 90 TABLET | Refills: 1 | Status: SHIPPED | OUTPATIENT
Start: 2019-07-05 | End: 2020-03-06

## 2019-07-05 RX ORDER — ESOMEPRAZOLE MAGNESIUM 40 MG/1
CAPSULE, DELAYED RELEASE ORAL
Qty: 90 CAPSULE | Refills: 3 | Status: SHIPPED | OUTPATIENT
Start: 2019-07-05 | End: 2020-06-01 | Stop reason: SDUPTHER

## 2019-07-05 ASSESSMENT — ENCOUNTER SYMPTOMS
SHORTNESS OF BREATH: 0
WHEEZING: 0
GASTROINTESTINAL NEGATIVE: 1
EYES NEGATIVE: 1
COUGH: 1
RHINORRHEA: 0
SORE THROAT: 0
ALLERGIC/IMMUNOLOGIC NEGATIVE: 1

## 2019-07-05 NOTE — PROGRESS NOTES
Post-Discharge Transitional Care Management Services or Hospital Follow Up      Coby Omalley   YOB: 1965    Date of Office Visit:  7/5/2019  Date of Hospital Admission: 6/29/19  Date of Hospital Discharge: 7/2/19  Readmission Risk Score(high >=14%.  Medium >=10%):Readmission Risk Score: 8      Care management risk score Rising risk (score 2-5) and Complex Care (Scores >=6): 1     Non face to face  following discharge, date last encounter closed (first attempt may have been earlier): *No documented post hospital discharge outreach found in the last 14 days *No documented post hospital discharge outreach found in the last 14 days    Call initiated 2 business days of discharge: *No response recorded in the last 14 days     Patient Active Problem List   Diagnosis    Gastroesophageal reflux disease without esophagitis    Tobacco abuse    Hyperlipidemia    Rheumatoid arthritis ([facility] [address]    Pneumonia due to organism       No Known Allergies    Medications listed as ordered at the time of discharge from hospital   Will Nick   [place] Medication Instructions DIANA:    Printed on:07/05/19 1023   Medication Information                      Adalimumab (HUMIRA SC)  Inject into the skin             albuterol sulfate HFA (VENTOLIN HFA) 108 (90 BASE) MCG/ACT inhaler  Inhale 2 puffs into the lungs every 4 hours as needed for Wheezing or Shortness of Breath             atorvastatin (LIPITOR) 20 MG tablet  TAKE 1 TABLET DAILY             azithromycin (ZITHROMAX) 500 MG tablet  Take 1 tablet by mouth daily for 4 days             butalbital-acetaminophen-caffeine (FIORICET, ESGIC) -40 MG per tablet  Take 1 tablet by mouth every 6 hours as needed for Headaches             cefdinir (OMNICEF) 300 MG capsule  Take 1 capsule by mouth 2 times daily for 4 days             esomeprazole (NEXIUM) 40 MG delayed release capsule  TAKE 1 CAPSULE EVERY       MORNING BEFORE BREAKFAST             folic acid (FOLVITE) 1 MG in 2 weeks to assure resolution of the infiltrate. Discussed importance of tobacco cessation as it relates to her current pneumonia issue and ongoing symptoms. She is considering patch continuation as outpt. Offered chantix or other options should she decided. Hypokalemia due to diuretic. Corrected. Starting supplemental potassium , on lasix long term. recheck with next labs. Depression: recurrent mood symptoms. Anxiety/agitation/poor mood. Multiple stressors in her life at present. paxil historically working well. Some tolerance vs too many stressors at the same time. Adding 10mg dose to get a total of 50mg/day.       Medical Decision Making: moderate complexity

## 2019-07-05 NOTE — PROGRESS NOTES
Jared [facility] Transitions Initial Follow Up Call    Call within 2 business days of discharge: Yes     Patient: Jonnie Singh Patient : 1965 MRN: [id number]    Last Discharge [facility]       Complaint Diagnosis Description Type Department Provider    19 Fever; Emesis Pneumonia due to organism ED to Hosp-Admission (Discharged) (ADMITTED) [address] KRISTIAN Roberts; Ashish Short. ..           RARS: Readmission Risk Score: 8       Spoke with: patient    Discharge department/facility: [place] [address]    Non-face-to-face services provided:  Scheduled appointment with PCP-Em    Follow Up  Future Appointments   Date Time Provider Yu White   10/9/2019  3:00 PM [facility]   2020  2:00 PM PARAS Barr - CNP  [facility]   2020  3:30 PM [address] [place] [address]

## 2019-07-06 LAB
CULTURE: NORMAL
Lab: NORMAL
SPECIMEN DESCRIPTION: NORMAL

## 2019-07-12 LAB
ABSOLUTE EOS #: 0 K/UL (ref 0–0.4)
ABSOLUTE IMMATURE GRANULOCYTE: ABNORMAL K/UL (ref 0–0.3)
ABSOLUTE LYMPH #: 0.56 K/UL (ref 1–4.8)
ABSOLUTE MONO #: 0.85 K/UL (ref 0.1–1.2)
ANION GAP SERPL CALCULATED.3IONS-SCNC: 14 MMOL/L (ref 9–17)
BASOPHILS # BLD: 0 % (ref 0–1)
BASOPHILS ABSOLUTE: 0 K/UL (ref 0–0.2)
BUN BLDV-MCNC: 12 MG/DL (ref 6–20)
BUN/CREAT BLD: 24 (ref 9–20)
CALCIUM SERPL-MCNC: 9.3 MG/DL (ref 8.6–10.4)
CHLORIDE BLD-SCNC: 94 MMOL/L (ref 98–107)
CO2: 19 MMOL/L (ref 20–31)
CREAT SERPL-MCNC: 0.49 MG/DL (ref 0.5–0.9)
DIFFERENTIAL TYPE: ABNORMAL
EOSINOPHILS RELATIVE PERCENT: 0 % (ref 1–7)
GFR AFRICAN AMERICAN: >60 ML/MIN
GFR NON-AFRICAN AMERICAN: >60 ML/MIN
GFR SERPL CREATININE-BSD FRML MDRD: ABNORMAL ML/MIN/{1.73_M2}
GFR SERPL CREATININE-BSD FRML MDRD: ABNORMAL ML/MIN/{1.73_M2}
GLUCOSE BLD-MCNC: 126 MG/DL (ref 70–99)
HCT VFR BLD CALC: 39.5 % (ref 36–46)
HEMOGLOBIN: 13.5 G/DL (ref 12–16)
IMMATURE GRANULOCYTES: ABNORMAL %
LACTIC ACID: 0.6 MMOL/L (ref 0.5–2.2)
LYMPHOCYTES # BLD: 6 % (ref 16–46)
MAGNESIUM: 1.8 MG/DL (ref 1.6–2.6)
MCH RBC QN AUTO: 34.4 PG (ref 26–34)
MCHC RBC AUTO-ENTMCNC: 34 G/DL (ref 31–37)
MCV RBC AUTO: 101 FL (ref 80–100)
MONOCYTES # BLD: 9 % (ref 4–11)
NRBC AUTOMATED: ABNORMAL PER 100 WBC
PDW BLD-RTO: 13.6 % (ref 11–14.5)
PLATELET # BLD: 227 K/UL (ref 140–450)
PLATELET ESTIMATE: ABNORMAL
PMV BLD AUTO: 8.8 FL (ref 6–12)
POTASSIUM SERPL-SCNC: 3.1 MMOL/L (ref 3.7–5.3)
RBC # BLD: 3.92 M/UL (ref 4–5.2)
RBC # BLD: ABNORMAL 10*6/UL
SEG NEUTROPHILS: 85 % (ref 43–77)
SEGMENTED NEUTROPHILS ABSOLUTE COUNT: 7.99 K/UL (ref 1.8–7.7)
SODIUM BLD-SCNC: 127 MMOL/L (ref 135–144)
WBC # BLD: 9.4 K/UL (ref 3.5–11)
WBC # BLD: ABNORMAL 10*3/UL

## 2019-10-13 DIAGNOSIS — E78.5 HYPERLIPIDEMIA, UNSPECIFIED HYPERLIPIDEMIA TYPE: ICD-10-CM

## 2019-10-14 RX ORDER — ATORVASTATIN CALCIUM 20 MG/1
TABLET, FILM COATED ORAL
Qty: 90 TABLET | Refills: 3 | Status: SHIPPED | OUTPATIENT
Start: 2019-10-14 | End: 2021-10-07 | Stop reason: SDUPTHER

## 2019-12-30 ENCOUNTER — OFFICE VISIT (OUTPATIENT)
Dept: PRIMARY CARE CLINIC | Age: 54
End: 2019-12-30
Payer: COMMERCIAL

## 2019-12-30 VITALS
TEMPERATURE: 99.8 F | WEIGHT: 166 LBS | OXYGEN SATURATION: 98 % | HEART RATE: 88 BPM | DIASTOLIC BLOOD PRESSURE: 72 MMHG | HEIGHT: 63 IN | SYSTOLIC BLOOD PRESSURE: 130 MMHG | BODY MASS INDEX: 29.41 KG/M2

## 2019-12-30 DIAGNOSIS — R51.9 INTRACTABLE HEADACHE, UNSPECIFIED CHRONICITY PATTERN, UNSPECIFIED HEADACHE TYPE: Primary | ICD-10-CM

## 2019-12-30 DIAGNOSIS — J06.9 ACUTE UPPER RESPIRATORY INFECTION: ICD-10-CM

## 2019-12-30 PROCEDURE — 99213 OFFICE O/P EST LOW 20 MIN: CPT | Performed by: FAMILY MEDICINE

## 2019-12-30 RX ORDER — AZITHROMYCIN 250 MG/1
250 TABLET, FILM COATED ORAL SEE ADMIN INSTRUCTIONS
Qty: 6 TABLET | Refills: 0 | Status: SHIPPED | OUTPATIENT
Start: 2019-12-30 | End: 2020-01-04

## 2019-12-30 ASSESSMENT — ENCOUNTER SYMPTOMS
RHINORRHEA: 1
EYES NEGATIVE: 1
WHEEZING: 0
SHORTNESS OF BREATH: 1
COUGH: 1
GASTROINTESTINAL NEGATIVE: 1

## 2020-01-16 ENCOUNTER — OFFICE VISIT (OUTPATIENT)
Dept: PRIMARY CARE CLINIC | Age: 55
End: 2020-01-16
Payer: COMMERCIAL

## 2020-01-16 ENCOUNTER — TELEPHONE (OUTPATIENT)
Dept: FAMILY MEDICINE CLINIC | Age: 55
End: 2020-01-16

## 2020-01-16 VITALS
BODY MASS INDEX: 30.65 KG/M2 | SYSTOLIC BLOOD PRESSURE: 124 MMHG | WEIGHT: 173 LBS | OXYGEN SATURATION: 98 % | DIASTOLIC BLOOD PRESSURE: 86 MMHG | TEMPERATURE: 97.4 F | HEART RATE: 68 BPM

## 2020-01-16 PROCEDURE — 99213 OFFICE O/P EST LOW 20 MIN: CPT | Performed by: FAMILY MEDICINE

## 2020-01-16 RX ORDER — PREDNISONE 50 MG/1
50 TABLET ORAL DAILY
Qty: 5 TABLET | Refills: 0 | Status: SHIPPED | OUTPATIENT
Start: 2020-01-16 | End: 2020-01-21

## 2020-01-16 RX ORDER — AMOXICILLIN AND CLAVULANATE POTASSIUM 875; 125 MG/1; MG/1
1 TABLET, FILM COATED ORAL 2 TIMES DAILY
Qty: 20 TABLET | Refills: 0 | Status: SHIPPED | OUTPATIENT
Start: 2020-01-16 | End: 2020-01-26

## 2020-01-16 ASSESSMENT — ENCOUNTER SYMPTOMS
COUGH: 1
GASTROINTESTINAL NEGATIVE: 1
WHEEZING: 1
SINUS PRESSURE: 1
EYES NEGATIVE: 1
SHORTNESS OF BREATH: 1
SORE THROAT: 0
ALLERGIC/IMMUNOLOGIC NEGATIVE: 1
RHINORRHEA: 1

## 2020-01-16 ASSESSMENT — PATIENT HEALTH QUESTIONNAIRE - PHQ9
SUM OF ALL RESPONSES TO PHQ QUESTIONS 1-9: 0
SUM OF ALL RESPONSES TO PHQ9 QUESTIONS 1 & 2: 0
SUM OF ALL RESPONSES TO PHQ QUESTIONS 1-9: 0
1. LITTLE INTEREST OR PLEASURE IN DOING THINGS: 0
2. FEELING DOWN, DEPRESSED OR HOPELESS: 0

## 2020-01-17 NOTE — PROGRESS NOTES
2020     Harshad Reyes (:  1965) is a 48 y.o. female, here for evaluation of the following medical concerns:    HPI Acute urgent care for ongoing cough. Seen 19 for cough, congestion and headache, concerns for recurrent pneumonia. Cold symptoms ongoing about 2 weeks at that time. Some immunocompromise on xeljanz. Ongoing smoking. Treated with zpak and afrin. Symptoms vary, but consistent cough, some earache. Nose still running. Past Medical History:   Diagnosis Date    Fibroid uterus     removed with laparoscopy     GERD (gastroesophageal reflux disease)     Hyperlipidemia     Rheumatoid arthritis (HCC)     Tobacco abuse      Past Surgical History:   Procedure Laterality Date    CHOLECYSTECTOMY      COLONOSCOPY  2018    [id number]    LAPAROSCOPY      exploratory\            Review of Systems   Constitutional: Negative. HENT: Positive for congestion, ear pain, postnasal drip, rhinorrhea and sinus pressure. Negative for sore throat. Eyes: Negative. Respiratory: Positive for cough, shortness of breath and wheezing. Cardiovascular: Negative. Gastrointestinal: Negative. Endocrine: Negative. Genitourinary: Negative. Musculoskeletal: Negative. Skin: Negative. Allergic/Immunologic: Negative. Neurological: Positive for headaches. Hematological: Negative. Psychiatric/Behavioral: Negative. Prior to Visit Medications    Medication Sig Taking?  Authorizing Provider   Tofacitinib Citrate (XELJANZ PO) Take by mouth Yes Historical Provider, MD   atorvastatin (LIPITOR) 20 MG tablet TAKE 1 TABLET DAILY Yes Margareth Lora MD   albuterol sulfate HFA (VENTOLIN HFA) 108 (90 Base) MCG/ACT inhaler Inhale 2 puffs into the lungs every 4 hours as needed for Wheezing or Shortness of Breath Yes Margareth Lora MD   PARoxetine (PAXIL) 40 MG tablet TAKE 1 TABLET EVERY MORNING Yes Margareth Lora MD   esomeprazole ([employer]) 40 MG delayed release capsule

## 2020-02-18 ENCOUNTER — HOSPITAL ENCOUNTER (OUTPATIENT)
Dept: MAMMOGRAPHY | Age: 55
Discharge: HOME | End: 2020-02-20
Payer: COMMERCIAL

## 2020-02-18 ENCOUNTER — HOSPITAL ENCOUNTER (OUTPATIENT)
Age: 55
Setting detail: SPECIMEN
Discharge: HOME | End: 2020-02-18
Payer: COMMERCIAL

## 2020-02-18 ENCOUNTER — OFFICE VISIT (OUTPATIENT)
Dept: OBGYN | Age: 55
End: 2020-02-18
Payer: COMMERCIAL

## 2020-02-18 VITALS
HEART RATE: 87 BPM | BODY MASS INDEX: 29.53 KG/M2 | WEIGHT: 173 LBS | DIASTOLIC BLOOD PRESSURE: 70 MMHG | TEMPERATURE: 98.6 F | SYSTOLIC BLOOD PRESSURE: 136 MMHG | HEIGHT: 64 IN

## 2020-02-18 LAB
CLUE CELLS: NEGATIVE
DIRECT EXAM: ABNORMAL
HYPHAL YEAST: PRESENT
KOH RESULT: NEGATIVE
Lab: ABNORMAL
SPECIMEN DESCRIPTION: ABNORMAL
TRICHOMONAS: NEGATIVE
WET PREP: ABNORMAL
WHITE BLOOD CELLS: NEGATIVE

## 2020-02-18 PROCEDURE — 87210 SMEAR WET MOUNT SALINE/INK: CPT | Performed by: NURSE PRACTITIONER

## 2020-02-18 PROCEDURE — 87660 TRICHOMONAS VAGIN DIR PROBE: CPT

## 2020-02-18 PROCEDURE — 87070 CULTURE OTHR SPECIMN AEROBIC: CPT

## 2020-02-18 PROCEDURE — 77063 BREAST TOMOSYNTHESIS BI: CPT

## 2020-02-18 PROCEDURE — 87480 CANDIDA DNA DIR PROBE: CPT

## 2020-02-18 PROCEDURE — 99396 PREV VISIT EST AGE 40-64: CPT | Performed by: NURSE PRACTITIONER

## 2020-02-18 PROCEDURE — 87510 GARDNER VAG DNA DIR PROBE: CPT

## 2020-02-18 RX ORDER — CLOTRIMAZOLE AND BETAMETHASONE DIPROPIONATE 10; .64 MG/G; MG/G
CREAM TOPICAL
Qty: 45 G | Refills: 0 | Status: SHIPPED | OUTPATIENT
Start: 2020-02-18 | End: 2021-10-07

## 2020-02-18 NOTE — PROGRESS NOTES
Venice Erin  2020              48 y.o. Chief Complaint   Patient presents with    Gynecologic Exam     Pap    Vaginal Itching     duration a couple of days, light discharge, no odor         Patient's last menstrual period was 2018 (within months). No referring provider defined for this encounter. HPI :Annual Exam  Patient presents for annual exam.  Counseling on healthy lifestyle reviewed, as well as the need for self breast exam. We reviewed the need for Kegal exercises. We discussed and reviewed the need for routine screenings and immunization updates when appropriate. Vaginal discharge past 3 days. Associated with burning, itching, and swelling. Yesterday she used one dose monistat with about 50 % relief. Did take antibiotics a few weeks ago for bronchitis. Lengthy discussion regarding smoking cessation. She would like to quit. Discussed several strategies to help her. Also advised her to discuss with Dr. Jessica Burdick. Denies vaginal bleeding or spotting and is aware of the need to report any she would note in the future. Performs monthly self breast exams. The patient is sexually active.     last pap: was normal, last mammogram: was normal  The patient has regular exercise: no . We did review the need for and frequency of both weight bearing and strengthening as tolerated by the patient. ________________________________________________________________________  OB History    Para Term  AB Living   3 3 3 0 0 6   SAB TAB Ectopic Molar Multiple Live Births   0 0 0 0 0 3      # Outcome Date GA Lbr Roberto Carlos/2nd Weight Sex Delivery Anes PTL Lv   3 Term      Vag-Spont   BRANDT   2 Term      Vag-Spont   BRANDT   1 Term      Vag-Spont   BRANDT     Past Medical History:   Diagnosis Date    Fibroid uterus     removed with laparoscopy     GERD (gastroesophageal reflux disease)     Hyperlipidemia     Rheumatoid arthritis (HCC)     Tobacco abuse Past Surgical History:   Procedure Laterality Date    CHOLECYSTECTOMY      COLONOSCOPY  2018    [id number]    LAPAROSCOPY      exploratory\     Family History   Problem Relation Age of Onset    Breast Cancer Mother         breast , tumor in bowels.  age 80    Heart Disease Father      Social History     Socioeconomic History    Marital status:      Spouse name: Not on file    Number of children: Not on file    Years of education: Not on file    Highest education level: Not on file   Occupational History    Not on file   Social Needs    Financial resource strain: Not on file    Food insecurity:     Worry: Not on file     Inability: Not on file    Transportation needs:     Medical: Not on file     Non-medical: Not on file   Tobacco Use    Smoking status: Current Every Day Smoker     Packs/day: 1.00     Years: 30.00     Pack years: 30.00     Types: Cigarettes    Smokeless tobacco: Never Used   Substance and Sexual Activity    Alcohol use:  Yes     Alcohol/week: 0.0 standard drinks     Comment: social    Drug use: No    Sexual activity: Not on file   Lifestyle    Physical activity:     Days per week: Not on file     Minutes per session: Not on file    Stress: Not on file   Relationships    Social connections:     Talks on phone: Not on file     Gets together: Not on file     Attends Hinduism service: Not on file     Active member of club or organization: Not on file     Attends meetings of clubs or organizations: Not on file     Relationship status: Not on file    Intimate partner violence:     Fear of current or ex partner: Not on file     Emotionally abused: Not on file     Physically abused: Not on file     Forced sexual activity: Not on file   Other Topics Concern    Not on file   Social History Narrative    Not on file       MEDICATIONS:  Current Outpatient Medications   Medication Sig Dispense Refill    terconazole (TERAZOL 3) 0.8 % Colonoscopy: 2018  Date of Last Bone Density: none  ________________________________________________________________________      Review Of Systems:  General ROS:  negative  Hematological and Lymphatic ROS:negative   Breast ROS: negative  Cardiovascular ROS: negative  Respiratory ROS: negative   Gastrointestinal ROS: negative  Genito-Urinary ROS: positive for vaginal itching  Psychological ROS: negative  Neurological ROS: negative  Musculoskeletal ROS: negative  Dermatological ROS: negative                                                                                                                                                                                   PHYSICAL Exam:     Constitutional:  Blood pressure 136/70, pulse 87, temperature 98.6 °F (37 °C), temperature source Tympanic, height 5' 3.5\" (1.613 m), weight 173 lb (78.5 kg), last menstrual period 01/18/2018, not currently breastfeeding. General Appearance: This  is a well Developed, well Nourished, well groomed female. Her BMI was reviewed. Skin:  There was a Normal Inspection of the skin without rashes or lesions. There were no rashes. (Papular, Maculopapular, Hives, Pustular, Macular)     There were no lesions (Ulcers, Erythema, Abn. Appearing Nevi)      Lymphatic:  No Lymph Nodes were Palpable in the neck , axilla or groin. Neck and EENT:  The neck was supple. There were no masses   The thyroid was not enlarged and had no masses. PERRLA, Nares Patent No Masses    Respiratory: The lungs were auscultated and found to be clear. There were no rales, rhonchi or wheezes. There was a good respiratory effort. Cardiovascular: The heart was in a regular rate and rhythm. . No S3 or S4. There was no murmur appreciated. Extremities: The patients extremities were without calf tenderness, edema, or varicosities. There was full range of motion in all four extremities. Pulses in all four extremities    Abdomen: The abdomen was soft and

## 2020-02-21 LAB
CULTURE: NORMAL
Lab: NORMAL
SPECIMEN DESCRIPTION: NORMAL

## 2020-03-05 ENCOUNTER — HOSPITAL ENCOUNTER (OUTPATIENT)
Age: 55
Setting detail: SPECIMEN
Discharge: HOME | End: 2020-03-05
Payer: COMMERCIAL

## 2020-03-05 ENCOUNTER — OFFICE VISIT (OUTPATIENT)
Dept: OBGYN | Age: 55
End: 2020-03-05
Payer: COMMERCIAL

## 2020-03-05 VITALS
HEART RATE: 88 BPM | BODY MASS INDEX: 29.53 KG/M2 | SYSTOLIC BLOOD PRESSURE: 110 MMHG | WEIGHT: 173 LBS | DIASTOLIC BLOOD PRESSURE: 64 MMHG | HEIGHT: 64 IN

## 2020-03-05 LAB
-: ABNORMAL
AMORPHOUS: ABNORMAL
BACTERIA: ABNORMAL
BILIRUBIN URINE: NEGATIVE
CASTS UA: ABNORMAL /LPF (ref 0–2)
CLUE CELLS: NEGATIVE
COLOR: ABNORMAL
COMMENT UA: ABNORMAL
CRYSTALS, UA: ABNORMAL /HPF
DIRECT EXAM: ABNORMAL
EPITHELIAL CELLS UA: ABNORMAL /HPF (ref 0–5)
GLUCOSE URINE: NEGATIVE
HYPHAL YEAST: NEGATIVE
KETONES, URINE: NEGATIVE
KOH RESULT: NEGATIVE
LEUKOCYTE ESTERASE, URINE: ABNORMAL
Lab: ABNORMAL
MUCUS: ABNORMAL
NITRITE, URINE: POSITIVE
OTHER OBSERVATIONS UA: ABNORMAL
PH UA: 5.5 (ref 5–6)
PROTEIN UA: NEGATIVE
RBC UA: ABNORMAL /HPF (ref 0–4)
RENAL EPITHELIAL, UA: ABNORMAL /HPF
SPECIFIC GRAVITY UA: 1.02 (ref 1.01–1.02)
SPECIMEN DESCRIPTION: ABNORMAL
TRICHOMONAS: ABNORMAL
TRICHOMONAS: NEGATIVE
TURBIDITY: ABNORMAL
URINE HGB: NEGATIVE
UROBILINOGEN, URINE: NORMAL
WBC UA: >50 /HPF (ref 0–4)
WET PREP: NORMAL
WHITE BLOOD CELLS: NEGATIVE
YEAST: ABNORMAL

## 2020-03-05 PROCEDURE — 87480 CANDIDA DNA DIR PROBE: CPT

## 2020-03-05 PROCEDURE — 81001 URINALYSIS AUTO W/SCOPE: CPT

## 2020-03-05 PROCEDURE — 87660 TRICHOMONAS VAGIN DIR PROBE: CPT

## 2020-03-05 PROCEDURE — 87086 URINE CULTURE/COLONY COUNT: CPT

## 2020-03-05 PROCEDURE — 87210 SMEAR WET MOUNT SALINE/INK: CPT | Performed by: NURSE PRACTITIONER

## 2020-03-05 PROCEDURE — 99213 OFFICE O/P EST LOW 20 MIN: CPT | Performed by: NURSE PRACTITIONER

## 2020-03-05 PROCEDURE — 87186 SC STD MICRODIL/AGAR DIL: CPT

## 2020-03-05 PROCEDURE — 87070 CULTURE OTHR SPECIMN AEROBIC: CPT

## 2020-03-05 PROCEDURE — 87077 CULTURE AEROBIC IDENTIFY: CPT

## 2020-03-05 PROCEDURE — 87510 GARDNER VAG DNA DIR PROBE: CPT

## 2020-03-05 PROCEDURE — G0145 SCR C/V CYTO,THINLAYER,RESCR: HCPCS

## 2020-03-05 RX ORDER — AMPICILLIN 500 MG/1
500 CAPSULE ORAL 4 TIMES DAILY
Qty: 28 CAPSULE | Refills: 0 | Status: SHIPPED | OUTPATIENT
Start: 2020-03-05 | End: 2020-03-12

## 2020-03-05 RX ORDER — NITROFURANTOIN 25; 75 MG/1; MG/1
100 CAPSULE ORAL 2 TIMES DAILY
Qty: 14 CAPSULE | Refills: 0 | Status: SHIPPED | OUTPATIENT
Start: 2020-03-05 | End: 2020-03-12

## 2020-03-05 RX ORDER — MULTIVIT WITH CALCIUM,IRON,MIN
1 TABLET ORAL DAILY
COMMUNITY

## 2020-03-05 RX ORDER — PHENAZOPYRIDINE HYDROCHLORIDE 100 MG/1
100 TABLET, FILM COATED ORAL 3 TIMES DAILY PRN
Qty: 9 TABLET | Refills: 0 | Status: SHIPPED | OUTPATIENT
Start: 2020-03-05 | End: 2021-03-05

## 2020-03-05 NOTE — PROGRESS NOTES
Subjective:  Swetha Kamara presents for pap and follow up of severe monilial vulvovaginitis. Symptoms have improved. 2 days ago started with dysuria and low back ache. Yesterday, began with vulvar itching. No vaginal discharge. Patient Active Problem List   Diagnosis    Gastroesophageal reflux disease without esophagitis    Tobacco abuse    Hyperlipidemia    Rheumatoid arthritis ([address] [address]    Pneumonia due to organism     Problem list reviewed as part of this encounter. Medication list reviewed and updated. See nursing note. History of present illness reviewed in detail and expanded by me. Review Of Systems:  General ROS:  negative  Hematological and Lymphatic ROS:negative   Breast ROS: negative  Cardiovascular ROS: negative  Respiratory ROS: negative   Gastrointestinal ROS: negative  Genito-Urinary ROS: positive for bladder problem, vaginal itching  Psychological ROS: negative  Neurological ROS: negative  Musculoskeletal ROS: negative  Dermatological ROS: negative                                                                                                                                          Objective:  Vitals:    03/05/20 1359   BP: 110/64   Pulse: 88     Alert and oriented. Abdomen: Soft, non-tender, no masses. No CVA tenderness  External Genetalia: Normal appearance of vulva,  Bartholin Glands, urethra, and Poynor's ducts  Vagina: Normal appearance of mucosa and rugae. No unusual discharge  Cervix: Normal appearance. No lesions noted.  Thin prep pap obtained  Uterus: Normal size, mobile, no CMT, non-tender  Adnexae: Non-palpable, non-tender, no masses  Vaginal culture and vaginitis DNA obtained    3/5/2020  2:43 PM - [facility], [facility] Incoming Lab Results From [facility]     Component Value Ref Range & Units Status Collected Lab   Color, UA NOT REPORTED  YELLOW Final 03/05/2020  2:10 PM [facility]   Turbidity UA NOT REPORTED  CLEAR Final 03/05/2020  2:10 PM [facility]   Glucose, Ur performed in visit on 03/05/20   POCT Wet Prep   Result Value Ref Range    Wet Prep Normal     Hyphal Yeast Negative     White Blood Cells Negative     Trichomonas, UA Negative     Clue Cells, Wet Prep Negative     KOH result Negative        Assessment:  Encounter Diagnoses   Name Primary?  Urinary tract infection without hematuria, site unspecified Yes    Urinary frequency     Burning with urination     Screening for cervical cancer     Vaginal itching     Other screening mammogram        Plan:  See orders. Orders Placed This Encounter   Procedures    Urine Culture     Standing Status:   Future     Number of Occurrences:   1     Standing Expiration Date:   3/5/2021     Order Specific Question:   Specify (ex-cath, midstream, cysto, etc)? Answer:   midstream    Culture, Genital     Standing Status:   Future     Number of Occurrences:   1     Standing Expiration Date:   3/5/2021    VAGINITIS DNA PROBE     Standing Status:   Future     Number of Occurrences:   1     Standing Expiration Date:   4/5/2020    PRIETO DIGITAL SCREEN W CAD BILATERAL     Standing Status:   Future     Standing Expiration Date:   9/5/2021     Scheduling Instructions: On the day of the exam, the patient should not wear deodorant, lotion, or powder on the breast or underarm area. Wear a two piece outfit and be prepared to give information about prior breast procedures including mammograms, biopsies, or surgeries. If you've had mammograms done elsewhere, please request any previous mammogram films from those organizations prior to your appointment. Order Specific Question:   Reason for exam:     Answer:   SCREENING MAMMOGRAM    Urinalysis With Microscopic     Standing Status:   Future     Number of Occurrences:   1     Standing Expiration Date:   3/5/2021     Order Specific Question:   SPECIFY(EX-CATH,MIDSTREAM,CYSTO,ETC)?      Answer:   midstream    PAP SMEAR     Patient History:    Patient's last menstrual period was 01/18/2018 (within months). OBGYN Status: Postmenopausal  Past Surgical History:  No date: CHOLECYSTECTOMY  04/27/2018: COLONOSCOPY      Comment:  [id number]  No date: LAPAROSCOPY      Comment:  exploratory\      Social History    Tobacco Use      Smoking status: Current Every Day Smoker        Packs/day: 1.00        Years: 30.00        Pack years: 30        Types: Cigarettes      Smokeless tobacco: Never Used       Standing Status:   Future     Number of Occurrences:   1     Standing Expiration Date:   3/5/2021     Order Specific Question:   Collection Type     Answer: Thin Prep     Order Specific Question:   Prior Abnormal Pap Test     Answer:   No     Order Specific Question:   Screening or Diagnostic     Answer:   Screening     Order Specific Question:   HPV Requested? Answer:   Yes - If Abnormal Reflex HPV     Order Specific Question:   High Risk Patient     Answer:   N/A    POCT Wet Prep     New Prescriptions    AMPICILLIN (PRINCIPEN) 500 MG CAPSULE    Take 1 capsule by mouth 4 times daily for 7 days    NITROFURANTOIN, MACROCRYSTAL-MONOHYDRATE, (MACROBID) 100 MG CAPSULE    Take 1 capsule by mouth 2 times daily for 7 days    PHENAZOPYRIDINE (PYRIDIUM) 100 MG TABLET    Take 1 tablet by mouth 3 times daily as needed for Pain     There are no Patient Instructions on file for this visit.     (Please note that portions of this note were completed with a voice-recognition program. Efforts were made to edit the dictations but occasionally words are mis-transcribed.)

## 2020-03-06 RX ORDER — POTASSIUM CHLORIDE 1500 MG/1
TABLET, EXTENDED RELEASE ORAL
Qty: 90 TABLET | Refills: 1 | Status: SHIPPED | OUTPATIENT
Start: 2020-03-06 | End: 2020-09-02

## 2020-03-06 NOTE — TELEPHONE ENCOUNTER
Rito Price called requesting a refill of the below medication which has been pended for you:     Requested Prescriptions     Pending Prescriptions Disp Refills    KLOR-CON M20 20 MEQ extended release tablet [Pharmacy Med Name: KLOR-CON M20 TAB 20MEQ ER] 90 tablet 1     Sig: TAKE 1 TABLET DAILY       Last Appointment Date: 1/16/2020  Next Appointment Date: Visit date not found    No Known Allergies

## 2020-03-07 LAB
CULTURE: ABNORMAL
Lab: ABNORMAL
SPECIMEN DESCRIPTION: ABNORMAL

## 2020-03-08 LAB
CULTURE: ABNORMAL
Lab: ABNORMAL
SPECIMEN DESCRIPTION: ABNORMAL

## 2020-03-09 RX ORDER — METRONIDAZOLE 500 MG/1
500 TABLET ORAL 2 TIMES DAILY WITH MEALS
Qty: 14 TABLET | Refills: 0 | Status: SHIPPED | OUTPATIENT
Start: 2020-03-09 | End: 2020-03-16

## 2020-03-13 ENCOUNTER — HOSPITAL ENCOUNTER (OUTPATIENT)
Dept: INTERVENTIONAL RADIOLOGY/VASCULAR | Age: 55
Discharge: HOME | End: 2020-03-15
Payer: COMMERCIAL

## 2020-03-13 ENCOUNTER — HOSPITAL ENCOUNTER (OUTPATIENT)
Dept: MAMMOGRAPHY | Age: 55
Discharge: HOME | End: 2020-03-15
Payer: COMMERCIAL

## 2020-03-13 PROCEDURE — G0279 TOMOSYNTHESIS, MAMMO: HCPCS

## 2020-03-13 PROCEDURE — 76642 ULTRASOUND BREAST LIMITED: CPT

## 2020-03-18 LAB — CYTOLOGY REPORT: NORMAL

## 2020-04-02 ENCOUNTER — TELEPHONE (OUTPATIENT)
Dept: OBGYN | Age: 55
End: 2020-04-02

## 2020-04-22 ENCOUNTER — TELEPHONE (OUTPATIENT)
Dept: FAMILY MEDICINE CLINIC | Age: 55
End: 2020-04-22

## 2020-04-24 ENCOUNTER — HOSPITAL ENCOUNTER (OUTPATIENT)
Dept: LAB | Age: 55
Discharge: HOME | End: 2020-04-24
Payer: COMMERCIAL

## 2020-04-24 ENCOUNTER — TELEPHONE (OUTPATIENT)
Dept: OBGYN | Age: 55
End: 2020-04-24

## 2020-04-24 LAB
-: ABNORMAL
AMORPHOUS: ABNORMAL
BACTERIA: ABNORMAL
BILIRUBIN URINE: NEGATIVE
CASTS UA: ABNORMAL /LPF (ref 0–2)
COLOR: ABNORMAL
COMMENT UA: ABNORMAL
CRYSTALS, UA: ABNORMAL /HPF
EPITHELIAL CELLS UA: ABNORMAL /HPF (ref 0–5)
GLUCOSE URINE: NEGATIVE
KETONES, URINE: NEGATIVE
LEUKOCYTE ESTERASE, URINE: ABNORMAL
MUCUS: ABNORMAL
NITRITE, URINE: NEGATIVE
OTHER OBSERVATIONS UA: ABNORMAL
PH UA: 5.5 (ref 5–6)
PROTEIN UA: ABNORMAL
RBC UA: ABNORMAL /HPF (ref 0–4)
RENAL EPITHELIAL, UA: ABNORMAL /HPF
SPECIFIC GRAVITY UA: 1.03 (ref 1.01–1.02)
TRICHOMONAS: ABNORMAL
TURBIDITY: ABNORMAL
URINE HGB: ABNORMAL
UROBILINOGEN, URINE: NORMAL
WBC UA: >100 /HPF (ref 0–4)
YEAST: ABNORMAL

## 2020-04-24 PROCEDURE — 87077 CULTURE AEROBIC IDENTIFY: CPT

## 2020-04-24 PROCEDURE — 81001 URINALYSIS AUTO W/SCOPE: CPT

## 2020-04-24 PROCEDURE — 87186 SC STD MICRODIL/AGAR DIL: CPT

## 2020-04-24 PROCEDURE — 87086 URINE CULTURE/COLONY COUNT: CPT

## 2020-04-24 RX ORDER — NITROFURANTOIN 25; 75 MG/1; MG/1
100 CAPSULE ORAL 2 TIMES DAILY
Qty: 14 CAPSULE | Refills: 0 | Status: SHIPPED | OUTPATIENT
Start: 2020-04-24 | End: 2020-05-01

## 2020-04-24 NOTE — TELEPHONE ENCOUNTER
Patient states she needed to do a repeat ua and was also having uti symptoms again. Patient came in today to have repeat ua, please advise and send any scripts to the clinic pharmacy.

## 2020-04-25 LAB
CULTURE: ABNORMAL
Lab: ABNORMAL
SPECIMEN DESCRIPTION: ABNORMAL

## 2020-04-27 RX ORDER — CIPROFLOXACIN 500 MG/1
500 TABLET, FILM COATED ORAL 2 TIMES DAILY
Qty: 20 TABLET | Refills: 0 | Status: SHIPPED | OUTPATIENT
Start: 2020-04-27 | End: 2020-05-07

## 2020-05-18 ENCOUNTER — TELEPHONE (OUTPATIENT)
Dept: OBGYN | Age: 55
End: 2020-05-18

## 2020-06-01 RX ORDER — ESOMEPRAZOLE MAGNESIUM 40 MG/1
CAPSULE, DELAYED RELEASE ORAL
Qty: 90 CAPSULE | Refills: 3 | Status: SHIPPED | OUTPATIENT
Start: 2020-06-01 | End: 2021-01-04 | Stop reason: SDUPTHER

## 2020-06-18 RX ORDER — PAROXETINE 10 MG/1
TABLET, FILM COATED ORAL
Qty: 90 TABLET | Refills: 3 | Status: SHIPPED | OUTPATIENT
Start: 2020-06-18 | End: 2021-01-04 | Stop reason: SDUPTHER

## 2020-06-23 ENCOUNTER — TELEPHONE (OUTPATIENT)
Dept: OBGYN | Age: 55
End: 2020-06-23

## 2020-07-20 RX ORDER — PAROXETINE HYDROCHLORIDE 40 MG/1
TABLET, FILM COATED ORAL
Qty: 90 TABLET | Refills: 3 | Status: SHIPPED | OUTPATIENT
Start: 2020-07-20 | End: 2021-01-04 | Stop reason: SDUPTHER

## 2020-09-01 ENCOUNTER — TELEPHONE (OUTPATIENT)
Dept: OBGYN | Age: 55
End: 2020-09-01

## 2020-09-02 RX ORDER — POTASSIUM CHLORIDE 1500 MG/1
TABLET, EXTENDED RELEASE ORAL
Qty: 90 TABLET | Refills: 1 | Status: SHIPPED | OUTPATIENT
Start: 2020-09-02 | End: 2021-01-04

## 2020-10-01 ENCOUNTER — TELEPHONE (OUTPATIENT)
Dept: OBGYN | Age: 55
End: 2020-10-01

## 2020-10-01 NOTE — TELEPHONE ENCOUNTER
Pt called back and appt scheduled for Monday am. She states she is going to try OTC over the weekend and call Monday if she still needs appt.

## 2020-10-05 ENCOUNTER — TELEPHONE (OUTPATIENT)
Dept: OBGYN | Age: 55
End: 2020-10-05

## 2021-01-04 RX ORDER — PAROXETINE 10 MG/1
TABLET, FILM COATED ORAL
Qty: 90 TABLET | Refills: 3 | Status: SHIPPED | OUTPATIENT
Start: 2021-01-04 | End: 2022-03-08

## 2021-01-04 RX ORDER — ESOMEPRAZOLE MAGNESIUM 40 MG/1
CAPSULE, DELAYED RELEASE ORAL
Qty: 90 CAPSULE | Refills: 3 | Status: SHIPPED | OUTPATIENT
Start: 2021-01-04 | End: 2022-01-10 | Stop reason: SDUPTHER

## 2021-01-04 RX ORDER — POTASSIUM CHLORIDE 1500 MG/1
TABLET, EXTENDED RELEASE ORAL
Qty: 90 TABLET | Refills: 1 | Status: SHIPPED | OUTPATIENT
Start: 2021-01-04 | End: 2021-10-07 | Stop reason: SDUPTHER

## 2021-01-04 RX ORDER — PAROXETINE HYDROCHLORIDE 40 MG/1
TABLET, FILM COATED ORAL
Qty: 90 TABLET | Refills: 3 | Status: SHIPPED | OUTPATIENT
Start: 2021-01-04 | End: 2022-02-07

## 2021-03-11 DIAGNOSIS — Z12.31 OTHER SCREENING MAMMOGRAM: ICD-10-CM

## 2021-03-18 ENCOUNTER — OFFICE VISIT (OUTPATIENT)
Dept: OBGYN | Age: 56
End: 2021-03-18
Payer: COMMERCIAL

## 2021-03-18 ENCOUNTER — HOSPITAL ENCOUNTER (OUTPATIENT)
Dept: LAB | Age: 56
Discharge: HOME | End: 2021-03-18
Payer: COMMERCIAL

## 2021-03-18 ENCOUNTER — HOSPITAL ENCOUNTER (OUTPATIENT)
Dept: MAMMOGRAPHY | Age: 56
Discharge: HOME | End: 2021-03-20
Payer: COMMERCIAL

## 2021-03-18 ENCOUNTER — HOSPITAL ENCOUNTER (OUTPATIENT)
Age: 56
Setting detail: SPECIMEN
Discharge: HOME | End: 2021-03-18
Payer: COMMERCIAL

## 2021-03-18 ENCOUNTER — HOSPITAL ENCOUNTER (OUTPATIENT)
Dept: BONE DENSITY | Age: 56
Discharge: HOME | End: 2021-03-20
Payer: COMMERCIAL

## 2021-03-18 VITALS
WEIGHT: 178 LBS | BODY MASS INDEX: 30.39 KG/M2 | HEART RATE: 72 BPM | SYSTOLIC BLOOD PRESSURE: 120 MMHG | DIASTOLIC BLOOD PRESSURE: 76 MMHG | HEIGHT: 64 IN

## 2021-03-18 DIAGNOSIS — Z78.0 MENOPAUSE: ICD-10-CM

## 2021-03-18 DIAGNOSIS — Z13.21 ENCOUNTER FOR VITAMIN DEFICIENCY SCREENING: ICD-10-CM

## 2021-03-18 DIAGNOSIS — Z12.4 SCREENING FOR CERVICAL CANCER: ICD-10-CM

## 2021-03-18 DIAGNOSIS — Z12.31 OTHER SCREENING MAMMOGRAM: ICD-10-CM

## 2021-03-18 DIAGNOSIS — Z01.419 WELL FEMALE EXAM WITH ROUTINE GYNECOLOGICAL EXAM: Primary | ICD-10-CM

## 2021-03-18 LAB — VITAMIN D 25-HYDROXY: 50.7 NG/ML (ref 30–100)

## 2021-03-18 PROCEDURE — 77085 DXA BONE DENSITY AXL VRT FX: CPT

## 2021-03-18 PROCEDURE — 77067 SCR MAMMO BI INCL CAD: CPT

## 2021-03-18 PROCEDURE — 36415 COLL VENOUS BLD VENIPUNCTURE: CPT

## 2021-03-18 PROCEDURE — 99396 PREV VISIT EST AGE 40-64: CPT | Performed by: NURSE PRACTITIONER

## 2021-03-18 PROCEDURE — G0145 SCR C/V CYTO,THINLAYER,RESCR: HCPCS

## 2021-03-18 PROCEDURE — 82306 VITAMIN D 25 HYDROXY: CPT

## 2021-03-18 RX ORDER — SULFASALAZINE 500 MG/1
TABLET, DELAYED RELEASE ORAL
COMMUNITY
Start: 2021-02-18 | End: 2021-10-07

## 2021-03-18 RX ORDER — PREDNISONE 1 MG/1
TABLET ORAL
COMMUNITY
Start: 2021-01-14 | End: 2022-08-11

## 2021-03-18 ASSESSMENT — PATIENT HEALTH QUESTIONNAIRE - PHQ9
2. FEELING DOWN, DEPRESSED OR HOPELESS: 0
SUM OF ALL RESPONSES TO PHQ QUESTIONS 1-9: 0

## 2021-03-18 NOTE — PROGRESS NOTES
James Nugent  3/18/2021              49 y.o. Chief Complaint   Patient presents with    Gynecologic Exam         Patient's last menstrual period was 2018 (within months). No referring provider defined for this encounter. HPI :Annual Exam  Patient presents for annual exam.  Counseling on healthy lifestyle reviewed, as well as the need for self breast exam. We reviewed the need for Kegal exercises and literature was provided. We discussed and reviewed the need for routine screenings and immunization updates when appropriate. Denies vaginal bleeding or spotting and is aware of the need to report any she would note in the future. Is having a lot more night sweats that are disrupting her sleep. Drinks 5 cups of coffee and 2 or 3 Pepsi's per day. Will try to decrease gradually. Performs self breast exams. The patient is sexually active.     last pap: was normal, last mammogram: was normal  The patient has regular exercise: no . We did review the need for and frequency of both weight bearing and strengthening as tolerated by the patient. ________________________________________________________________________  OB History    Para Term  AB Living   3 3 3 0 0 6   SAB TAB Ectopic Molar Multiple Live Births   0 0 0 0 0 3      # Outcome Date GA Lbr Roberto Carlos/2nd Weight Sex Delivery Anes PTL Lv   3 Term      Vag-Spont   BRANDT   2 Term      Vag-Spont   BRANDT   1 Term      Vag-Spont   BRANDT     Past Medical History:   Diagnosis Date    Fibroid uterus     removed with laparoscopy     GERD (gastroesophageal reflux disease)     Hyperlipidemia     Rheumatoid arthritis (HCC)     Tobacco abuse                                                                    Past Surgical History:   Procedure Laterality Date    CHOLECYSTECTOMY      COLONOSCOPY  2018    [id number]    LAPAROSCOPY      exploratory\     Family History   Problem Relation Age of Onset    Breast Cancer Mother breast , tumor in bowels.  age 80    Heart Disease Father      Social History     Socioeconomic History    Marital status:      Spouse name: Not on file    Number of children: Not on file    Years of education: Not on file    Highest education level: Not on file   Occupational History    Not on file   Social Needs    Financial resource strain: Not on file    Food insecurity     Worry: Not on file     Inability: Not on file    Transportation needs     Medical: Not on file     Non-medical: Not on file   Tobacco Use    Smoking status: Current Every Day Smoker     Packs/day: 1.00     Years: 30.00     Pack years: 30.00     Types: Cigarettes    Smokeless tobacco: Never Used   Substance and Sexual Activity    Alcohol use: Yes     Alcohol/week: 0.0 standard drinks     Comment: social    Drug use: No    Sexual activity: Yes     Partners: Male     Birth control/protection: Post-menopausal   Lifestyle    Physical activity     Days per week: Not on file     Minutes per session: Not on file    Stress: Not on file   Relationships    Social connections     Talks on phone: Not on file     Gets together: Not on file     Attends Confucianism service: Not on file     Active member of club or organization: Not on file     Attends meetings of clubs or organizations: Not on file     Relationship status: Not on file    Intimate partner violence     Fear of current or ex partner: Not on file     Emotionally abused: Not on file     Physically abused: Not on file     Forced sexual activity: Not on file   Other Topics Concern    Not on file   Social History Narrative    Not on file       MEDICATIONS:  Current Outpatient Medications   Medication Sig Dispense Refill    KLOR-CON M20 20 MEQ extended release tablet TAKE 1 TABLET DAILY 90 tablet 1    PARoxetine (PAXIL) 40 MG tablet 1 po daily 90 tablet 3    PARoxetine (PAXIL) 10 MG tablet TAKE 1 TABLET EVERY MORNINGWITH THE 40 MG DOSE.  90 tablet 3    esomeprazole (NEXIUM) 40 MG delayed release capsule TAKE 1 CAPSULE EVERY       MORNING BEFORE BREAKFAST 90 capsule 3    Multiple Vitamins-Minerals (MULTIPLE VITAMINS/WOMENS) TABS Take 1 tablet by mouth daily      clotrimazole-betamethasone (LOTRISONE) 1-0.05 % cream Apply a thin film to the affected area 2 times daily. 45 g 0    Tofacitinib Citrate (XELJANZ PO) Take by mouth      atorvastatin (LIPITOR) 20 MG tablet TAKE 1 TABLET DAILY 90 tablet 3    albuterol sulfate HFA (VENTOLIN HFA) 108 (90 Base) MCG/ACT inhaler Inhale 2 puffs into the lungs every 4 hours as needed for Wheezing or Shortness of Breath 1 Inhaler 0    furosemide (LASIX) 20 MG tablet TAKE 1 TABLET DAILY AS     NEEDED FOR EDEMA 90 tablet 0    tiZANidine (ZANAFLEX) 2 MG tablet       naproxen (NAPROSYN) 500 MG tablet       methotrexate (RHEUMATREX) 2.5 MG chemo tablet Take by mouth once a week      folic acid (FOLVITE) 1 MG tablet Take 1 mg by mouth daily      vitamin D (CHOLECALCIFEROL) 1000 UNIT TABS tablet Take 1,000 Units by mouth daily      predniSONE (DELTASONE) 5 MG tablet       sulfaSALAzine (AZULFIDINE) 500 MG EC tablet       butalbital-acetaminophen-caffeine (FIORICET, ESGIC) -40 MG per tablet Take 1 tablet by mouth every 6 hours as needed for Headaches 10 tablet 0     No current facility-administered medications for this visit. ALLERGIES:  Allergies as of 03/18/2021    (No Known Allergies)       Gynecologic History:  Menarche: 13   Menopause at 50      Patient's last menstrual period was 01/18/2018 (within months).   Hormone Exposure: No    Family History of Breast, Ovarian , Colon or Uterine Cancer: Yes      Preventative Health Testing:  Date of Last Pap Smear: 2020  Date of Last Mammogram: 2020  Date of Last Colonoscopy: 2018  Date of Last Bone Density: n/a  ________________________________________________________________________      Review Of Systems:  General ROS:  negative  Hematological and Lymphatic ROS:negative   Breast ROS: negative  Cardiovascular ROS: negative  Respiratory ROS: negative   Gastrointestinal ROS: negative  Genito-Urinary ROS: negative  Psychological ROS: negative  Neurological ROS: negative  Musculoskeletal ROS: negative  Dermatological ROS: negative                                                                                                                                                                                   PHYSICAL Exam:     Constitutional:  Blood pressure 120/76, pulse 72, height 5' 3.5\" (1.613 m), weight 178 lb (80.7 kg), last menstrual period 01/18/2018, not currently breastfeeding. General Appearance: This  is a well Developed, well Nourished, well groomed female. Her BMI was reviewed. Skin:  There was a Normal Inspection of the skin without rashes or lesions. Lymphatic:  No Lymph Nodes were Palpable in the neck , axilla or groin. Neck and EENT:  The neck was supple. There were no masses   The thyroid was not enlarged and had no masses. PERRLA, Nares Patent No Masses    Respiratory: The lungs were auscultated and found to be clear. There were no rales, rhonchi or wheezes. There was a good respiratory effort. Cardiovascular: The heart was in a regular rate and rhythm. There was no murmur appreciated. Extremities: The patients extremities were without calf tenderness, edema, or varicosities. There was full range of motion in all four extremities. Pulses in all four extremities    Abdomen: The abdomen was soft and non-tender. There was no guarding, rebound or rigidity. On evaluation there was no evidence of hepatosplenomegaly and there was no costal vertebral robel tenderness bilaterally. No hernias were appreciated. Psych:   The patient had a normal Orientation to: Time, Place, Person, and Situation  Mood and affect appropriate    Breast:  (Chest)  normal appearance, no masses or tenderness, Inspection negative, No nipple retraction or dimpling, No nipple discharge or bleeding, No axillary or supraclavicular adenopathy, Normal to palpation without dominant masses      Pelvic Exam:  External genitalia: normal general appearance  Urinary system: urethral meatus normal  Vaginal: normal mucosa without prolapse or lesions, normal without tenderness, induration or masses and normal rugae  Cervix: normal appearance and thin prep PAP obtained  Adnexa: normal bimanual exam and non palpable  Uterus: normal single, nontender    Musculosk:  Normal Gait and station was noted. Digits were evaluated without abnormal findings. Range of motion, stability and strength were evaluated and found to be appropriate for the patients age. ASSESSMENT:      49 y.o. Annual   Diagnosis Orders   1. Well female exam with routine gynecological exam     2. Screening for cervical cancer  PAP SMEAR   3. Other screening mammogram  [facility] RIMA DIGITAL SCREEN BILATERAL   4. Encounter for vitamin deficiency screening  Vitamin D 25 Hydroxy        Chief Complaint   Patient presents with    Gynecologic Exam         Past Medical History:   Diagnosis Date    Fibroid uterus     removed with laparoscopy     GERD (gastroesophageal reflux disease)     Hyperlipidemia     Rheumatoid arthritis ([address]     Tobacco abuse          Patient Active Problem List   Diagnosis    Gastroesophageal reflux disease without esophagitis    Tobacco abuse    Hyperlipidemia    Rheumatoid arthritis ([address]    Pneumonia due to organism          Hereditary Breast, Ovarian, Colon and Uterine Cancer screening Done. Tobacco & Secondary smoke risks reviewed; instructed on cessation and avoidance    PLAN:  Return in about 1 year (around 3/18/2022) for Annual Exam, Mammogram.  Return for annual exams  Mammograms every 1 year. If 37 yo and last mammogram was negative. Routine health maintenance per patients PCP.   Orders Placed This Encounter   Procedures    PRIETO RIMA DIGITAL SCREEN BILATERAL     Standing Status:   Future Standing Expiration Date:   9/18/2022    PAP SMEAR     Standing Status:   Future     Number of Occurrences:   1     Standing Expiration Date:   5/18/2021     Order Specific Question:   Collection Type     Answer: Thin Prep     Order Specific Question:   Prior Abnormal Pap Test     Answer:   No     Order Specific Question:   Screening or Diagnostic     Answer:   Screening     Order Specific Question:   HPV Requested?      Answer:   Yes - If Abnormal Reflex HPV     Order Specific Question:   High Risk Patient     Answer:   N/A    Vitamin D 25 Hydroxy     Standing Status:   Future     Number of Occurrences:   1     Standing Expiration Date:   6/18/2021       Oriana Erickson  3/18/2021

## 2021-03-26 LAB — CYTOLOGY REPORT: NORMAL

## 2021-09-20 ENCOUNTER — HOSPITAL ENCOUNTER (OUTPATIENT)
Dept: MRI IMAGING | Age: 56
Discharge: HOME | End: 2021-09-22
Payer: COMMERCIAL

## 2021-09-20 DIAGNOSIS — R22.41 MASS OF RIGHT THIGH: ICD-10-CM

## 2021-09-20 PROCEDURE — 73718 MRI LOWER EXTREMITY W/O DYE: CPT

## 2021-09-27 ENCOUNTER — TELEPHONE (OUTPATIENT)
Dept: FAMILY MEDICINE CLINIC | Age: 56
End: 2021-09-27

## 2021-09-27 NOTE — TELEPHONE ENCOUNTER
----- Message from Betty Michael sent at 9/27/2021  8:12 AM EDT -----  Subject: Appointment Request    Reason for Call: Routine Existing Condition Follow Up    QUESTIONS  Type of Appointment? Established Patient  Reason for appointment request? Available appointments did not meet   patient need  Additional Information for Provider? patient has a lump on her right hip,   she had an MRI done last week and was benign. Would like to see if she can   get the name of another Rheumatologist and would like to go over MRI   results with the Doctor. . Her pain feels more like muscle pain than joint   pain. Please contact patient to schedule appt  ---------------------------------------------------------------------------  --------------  CALL BACK INFO  What is the best way for the office to contact you? OK to leave message on   voicemail  Preferred Call Back Phone Number? [phone]  ---------------------------------------------------------------------------  --------------  SCRIPT ANSWERS  Relationship to Patient? Self  (Is the patient requesting to be seen urgently for their symptoms?)? No  Is this follow up request related to routine Diabetes Management? No  Are you having any new concerns about your existing condition? No  Have you been diagnosed with, awaiting test results for, or told that you   are suspected of having COVID-19 (Coronavirus)? (If patient has tested   negative or was tested as a requirement for work, school, or travel and   not based on symptoms, answer no)? No  Within the past two weeks have you developed any of the following symptoms   (answer no if symptoms have been present longer than 2 weeks or began   more than 2 weeks ago)? Fever or Chills, Cough, Shortness of breath or   difficulty breathing, Loss of taste or smell, Sore throat, Nasal   congestion, Sneezing or runny nose, Fatigue or generalized body aches   (answer no if pain is specific to a body part e.g. back pain), Diarrhea,   Headache? No  Have you had close contact with someone with COVID-19 in the last 14 days? No  (Service Expert  click yes below to proceed with [employer] As Usual   Scheduling)?  Yes

## 2021-09-28 ENCOUNTER — HOSPITAL ENCOUNTER (OUTPATIENT)
Dept: GENERAL RADIOLOGY | Age: 56
Discharge: HOME | End: 2021-09-30
Payer: COMMERCIAL

## 2021-09-28 DIAGNOSIS — M19.90 ARTHRITIS: ICD-10-CM

## 2021-09-28 PROCEDURE — 73562 X-RAY EXAM OF KNEE 3: CPT

## 2021-09-28 PROCEDURE — 73502 X-RAY EXAM HIP UNI 2-3 VIEWS: CPT

## 2021-10-07 ENCOUNTER — OFFICE VISIT (OUTPATIENT)
Dept: FAMILY MEDICINE CLINIC | Age: 56
End: 2021-10-07
Payer: COMMERCIAL

## 2021-10-07 VITALS
HEIGHT: 66 IN | DIASTOLIC BLOOD PRESSURE: 74 MMHG | HEART RATE: 68 BPM | WEIGHT: 162 LBS | SYSTOLIC BLOOD PRESSURE: 126 MMHG | BODY MASS INDEX: 26.03 KG/M2

## 2021-10-07 DIAGNOSIS — M17.12 PRIMARY OSTEOARTHRITIS OF LEFT KNEE: ICD-10-CM

## 2021-10-07 DIAGNOSIS — M16.12 PRIMARY OSTEOARTHRITIS OF LEFT HIP: Primary | ICD-10-CM

## 2021-10-07 DIAGNOSIS — E78.5 HYPERLIPIDEMIA, UNSPECIFIED HYPERLIPIDEMIA TYPE: ICD-10-CM

## 2021-10-07 PROCEDURE — 99214 OFFICE O/P EST MOD 30 MIN: CPT | Performed by: FAMILY MEDICINE

## 2021-10-07 RX ORDER — ATORVASTATIN CALCIUM 20 MG/1
TABLET, FILM COATED ORAL
Qty: 90 TABLET | Refills: 3 | Status: SHIPPED | OUTPATIENT
Start: 2021-10-07

## 2021-10-07 RX ORDER — POTASSIUM CHLORIDE 20 MEQ/1
TABLET, EXTENDED RELEASE ORAL
Qty: 90 TABLET | Refills: 1 | Status: SHIPPED | OUTPATIENT
Start: 2021-10-07 | End: 2022-08-11 | Stop reason: SDUPTHER

## 2021-10-07 RX ORDER — TRAMADOL HYDROCHLORIDE 50 MG/1
50 TABLET ORAL EVERY 6 HOURS PRN
Qty: 28 TABLET | Refills: 0 | Status: SHIPPED | OUTPATIENT
Start: 2021-10-07 | End: 2021-10-27 | Stop reason: SDUPTHER

## 2021-10-07 RX ORDER — CYCLOBENZAPRINE HCL 10 MG
10 TABLET ORAL 3 TIMES DAILY PRN
COMMUNITY

## 2021-10-07 ASSESSMENT — ENCOUNTER SYMPTOMS
BACK PAIN: 1
RESPIRATORY NEGATIVE: 1
ALLERGIC/IMMUNOLOGIC NEGATIVE: 1
GASTROINTESTINAL NEGATIVE: 1
EYES NEGATIVE: 1

## 2021-10-07 NOTE — PROGRESS NOTES
Subjective:      Patient ID: Mirian Ponce is a 49 y.o. female. HPI Acute visit for recent musculoskeletal issues. Soft tissue mass developing over the last 5 month over the upper, lateral right thigh. Her left leg is causing pain issues. Left hip with advanced arthritis and probable AVN. Left knee painful with walking at times. She feels gait likely thrown off by hip issues and night be aggravating her left knee. Using ibuprofen at present for pain. Still smoking. Also on prednisone 5 mg/day. Discussed smoking cessation and prednisone taper prior to anticipated surgery. Past Medical History:   Diagnosis Date    Fibroid uterus     removed with laparoscopy     GERD (gastroesophageal reflux disease)     Hyperlipidemia     Rheumatoid arthritis (HCC)     Tobacco abuse      Past Surgical History:   Procedure Laterality Date    CHOLECYSTECTOMY      COLONOSCOPY  04/27/2018    [id number]    LAPAROSCOPY      exploratory\     Current Outpatient Medications   Medication Sig Dispense Refill    cyclobenzaprine (FLEXERIL) 10 MG tablet Take 10 mg by mouth 3 times daily as needed for Muscle spasms      predniSONE (DELTASONE) 5 MG tablet       KLOR-CON M20 20 MEQ extended release tablet TAKE 1 TABLET DAILY 90 tablet 1    PARoxetine (PAXIL) 40 MG tablet 1 po daily 90 tablet 3    PARoxetine (PAXIL) 10 MG tablet TAKE 1 TABLET EVERY MORNINGWITH THE 40 MG DOSE.  90 tablet 3    esomeprazole (NEXIUM) 40 MG delayed release capsule TAKE 1 CAPSULE EVERY       MORNING BEFORE BREAKFAST 90 capsule 3    Multiple Vitamins-Minerals (MULTIPLE VITAMINS/WOMENS) TABS Take 1 tablet by mouth daily      Tofacitinib Citrate (XELJANZ PO) Take by mouth      atorvastatin (LIPITOR) 20 MG tablet TAKE 1 TABLET DAILY 90 tablet 3    albuterol sulfate HFA (VENTOLIN HFA) 108 (90 Base) MCG/ACT inhaler Inhale 2 puffs into the lungs every 4 hours as needed for Wheezing or Shortness of Breath 1 Inhaler 0    furosemide (LASIX) 20 MG tablet TAKE 1 TABLET DAILY AS     NEEDED FOR EDEMA 90 tablet 0    methotrexate (RHEUMATREX) 2.5 MG chemo tablet Take by mouth once a week      folic acid (FOLVITE) 1 MG tablet Take 1 mg by mouth daily      vitamin D (CHOLECALCIFEROL) 1000 UNIT TABS tablet Take 1,000 Units by mouth daily      naproxen (NAPROSYN) 500 MG tablet        No current facility-administered medications for this visit. MRI:   Impression   1. Enlarged right tensor fasciae latae muscle, likely accounting for the   palpable abnormality.  This is a normal variant that can present as a   pseudomass. 2. High-grade cartilage loss throughout the left hip joint with extensive   subchondral edema.  Geographic marrow signal abnormality in the left femoral   head may reflect osteonecrosis (AVN).  Consider radiographic correlation or   dedicated MRI of the left hip.   3. Mild right greater trochanteric bursitis.  Moderate right hip degenerative   change.       Left hip xray. Impression   Severe degenerative changes of the left hip with probable superimposed   avascular necrosis           EXAMINATION:   THREE XRAY VIEWS OF THE LEFT KNEE       9/28/2021 3:23 pm       COMPARISON:   None.       HISTORY:   ORDERING SYSTEM PROVIDED HISTORY: Arthritis   TECHNOLOGIST PROVIDED HISTORY:   Reason for Exam: arthritis   Acuity: Chronic   Type of Exam: Unknown       FINDINGS:   There is mild patellofemoral and minimal medial and lateral tibiofemoral   joint space compromise.       There is no definite effusion, fracture, dislocation, more significant   degenerative/erosive change, radiopaque foreign body, abnormal soft tissue   calcification or bony destructive lesion           Impression   Mild degenerative changes      She describes cracking and popping of the left knee with walking. Wearing a knee sleeve. Review of Systems   Constitutional: Negative. HENT: Negative. Eyes: Negative. Respiratory: Negative.     Cardiovascular: Negative. Gastrointestinal: Negative. Endocrine: Negative. Genitourinary: Negative. Musculoskeletal: Positive for arthralgias (left hip, left knee) and back pain (left side, some radiuclar pain into the left gluteal and lateral thigh at times. sciatica in the past.  ). Skin: Negative. Allergic/Immunologic: Negative. Neurological: Negative. Hematological: Negative. Psychiatric/Behavioral: Negative. Objective:   Physical Exam  Constitutional:       Appearance: Normal appearance. HENT:      Head: Normocephalic and atraumatic. Nose: Nose normal.   Eyes:      Pupils: Pupils are equal, round, and reactive to light. Cardiovascular:      Rate and Rhythm: Normal rate. Pulses: Normal pulses. Pulmonary:      Effort: Pulmonary effort is normal.   Abdominal:      General: There is no distension. Musculoskeletal:      Cervical back: Normal range of motion. Left hip: Tenderness and bony tenderness present. Decreased range of motion. Left knee: Bony tenderness (pain over the trochanteric bursae area) present. No swelling or erythema. Decreased range of motion. Tenderness present. Neurological:      Mental Status: She is alert. /74 (Site: Left Upper Arm, Position: Sitting, Cuff Size: Large Adult)   Pulse 68   Ht 5' 6\" (1.676 m)   Wt 162 lb (73.5 kg)   LMP 01/18/2018 (Within Months)   BMI 26.15 kg/m²     Assessment:      Encounter Diagnoses   Name Primary?  Hyperlipidemia, unspecified hyperlipidemia type     Primary osteoarthritis of left hip Yes    Primary osteoarthritis of left knee            Plan:      Doing well on lipitor. Cont. Current dosing. Refilling. oa left hip with probable AVN of femoral head. Has appointment with Dr. Lidia Malloy coming up. Anticipate AKASH surgery. Rec. Smoking cessation and tapering off prednisone if she can, prior to surgery. Ibuprofen not helping much at present.   Discussed how smoking and nsaids can lead to AVN.  Pain problematic with wt. Bearing. She will need to be off the ibuprofen prior to surgery. Plan to trial ultram every 6 hrs prn moderate to severe pain. oa left knee: mild arthritis on xray. Knee pain may be a complication of gait abnormality with left hip.   Will wait and see how knee does long term after ghazal.          Ailyn Garces MD

## 2021-10-12 ENCOUNTER — OFFICE VISIT (OUTPATIENT)
Dept: ORTHOPEDIC SURGERY | Age: 56
End: 2021-10-12
Payer: COMMERCIAL

## 2021-10-12 ENCOUNTER — HOSPITAL ENCOUNTER (OUTPATIENT)
Age: 56
Setting detail: SPECIMEN
Discharge: HOME | End: 2021-10-12
Payer: COMMERCIAL

## 2021-10-12 ENCOUNTER — TELEPHONE (OUTPATIENT)
Dept: FAMILY MEDICINE CLINIC | Age: 56
End: 2021-10-12

## 2021-10-12 VITALS
WEIGHT: 178 LBS | BODY MASS INDEX: 31.54 KG/M2 | HEIGHT: 63 IN | DIASTOLIC BLOOD PRESSURE: 90 MMHG | HEART RATE: 100 BPM | SYSTOLIC BLOOD PRESSURE: 140 MMHG

## 2021-10-12 DIAGNOSIS — M25.552 LEFT HIP PAIN: ICD-10-CM

## 2021-10-12 DIAGNOSIS — Z01.818 PRE-OP TESTING: Primary | ICD-10-CM

## 2021-10-12 DIAGNOSIS — M87.00 AVASCULAR NECROSIS (HCC): ICD-10-CM

## 2021-10-12 DIAGNOSIS — M16.12 PRIMARY OSTEOARTHRITIS OF LEFT HIP: ICD-10-CM

## 2021-10-12 DIAGNOSIS — Z01.818 PRE-OP TESTING: ICD-10-CM

## 2021-10-12 PROCEDURE — 87641 MR-STAPH DNA AMP PROBE: CPT

## 2021-10-12 PROCEDURE — 99213 OFFICE O/P EST LOW 20 MIN: CPT | Performed by: NURSE PRACTITIONER

## 2021-10-12 NOTE — H&P
History and Physical        CHIEF COMPLAINT: Left hip pain    HISTORY OF PRESENT ILLNESS:      The patient is a 49 y.o. female  who presents with left hip pain. Patient states it has been ongoing for many years. She has tried previous corticosteroid injections in the past by another physician. She is also had bilateral corticosteroid injections into her greater trochanteric bursa's for bilateral hip bursitis. She is diagnosed with rheumatoid arthritis and sees a specialty physician for this. She states she is currently taking tramadol and Flexeril for pain. She rates her pain an 8 out of 10 in the left hip. States it is continuing to worsen. Her rheumatoid arthritis medications include methotrexate naproxen prednisone Alonna Raúl and a injectable every 3 months that is unaware of the name. Patient states her pain is worse with activity going up stairs getting up from a seated position. She had x-rays of her left hip which show significant left hip osteoarthritis with severe bone-on-bone. Also appears to have avascular necrosis of the left hip joint. She is here today to discuss surgical options. She is unable to have surgery at [facility] secondary to her [place]. Past Medical History:    Past Medical History:   Diagnosis Date    Fibroid uterus     removed with laparoscopy     GERD (gastroesophageal reflux disease)     Hyperlipidemia     Rheumatoid arthritis ([address]     Tobacco abuse        Past Surgical History:    Past Surgical History:   Procedure Laterality Date    CHOLECYSTECTOMY      COLONOSCOPY  04/27/2018    [id number]    LAPAROSCOPY      exploratory\       Medications Prior to Admission:   Prior to Admission medications    Medication Sig Start Date End Date Taking?  Authorizing Provider   cyclobenzaprine (FLEXERIL) 10 MG tablet Take 10 mg by mouth 3 times daily as needed for Muscle spasms    Historical Provider, MD   potassium chloride (KLOR-CON M20) 20 MEQ extended release tablet TAKE 1 TABLET DAILY 10/7/21   Joshua Veronica MD   atorvastatin (LIPITOR) 20 MG tablet 1 po daily 10/7/21   Joshua Veronica MD   traMADol (ULTRAM) 50 MG tablet Take 1 tablet by mouth every 6 hours as needed for Pain for up to 7 days. Intended supply: 7 days. Take lowest dose possible to manage pain 10/7/21 10/14/21  Joshua Veronica MD   predniSONE (DELTASONE) 5 MG tablet  1/14/21   Historical Provider, MD   PARoxetine (PAXIL) 40 MG tablet 1 po daily 1/4/21   Joshua Veronica MD   PARoxetine (PAXIL) 10 MG tablet TAKE 1 TABLET EVERY MORNINGWITH THE 40 MG DOSE. 1/4/21   Joshua Veronica MD   esomeprazole (NEXIUM) 40 MG delayed release capsule TAKE 1 CAPSULE EVERY       MORNING BEFORE BREAKFAST 1/4/21   Joshua Veronica MD   Multiple Vitamins-Minerals (MULTIPLE VITAMINS/WOMENS) TABS Take 1 tablet by mouth daily    Historical Provider, MD   Tofacitinib Citrate (XELJANZ PO) Take by mouth    Historical Provider, MD   albuterol sulfate HFA (VENTOLIN HFA) 108 (90 Base) MCG/ACT inhaler Inhale 2 puffs into the lungs every 4 hours as needed for Wheezing or Shortness of Breath 7/5/19   Joshua Veronica MD   furosemide (LASIX) 20 MG tablet TAKE 1 TABLET DAILY AS     NEEDED FOR EDEMA 5/3/18   Johsua Veronica MD   naproxen (NAPROSYN) 500 MG tablet  10/27/16   Historical Provider, MD   methotrexate (RHEUMATREX) 2.5 MG chemo tablet Take by mouth once a week    Historical Provider, MD   folic acid (FOLVITE) 1 MG tablet Take 1 mg by mouth daily    Historical Provider, MD   vitamin D (CHOLECALCIFEROL) 1000 UNIT TABS tablet Take 1,000 Units by mouth daily    Historical Provider, MD    Scheduled Meds:  Continuous Infusions:  PRN Meds:. Allergies:  Patient has no known allergies.     Social History:   Social History     Socioeconomic History    Marital status:      Spouse name: None    Number of children: None    Years of education: None    Highest education level: None   Occupational History    None fever, chills. Derm: Denies any rash or skin color change. Eyes: Denies blurred or decreased in vision. Ent: Denies any tinnitus or vertigo. Resp: Denies any cough or shortness of breath. CV: Denies any syncope, palpitations or chest pain. GI:  Denies any abdominal pain, nausea, vomiting, constipation or diarrhea. : Denies any hematuria, hesitancy, or dysuria. Heme/Lymph: Denies any bleeding. Musculoskeletal: Positive for severe left hip pain. Neuro: Denies any dizziness, paresthesia or weakness. PHYSICAL EXAM:  [unfilled]  General appearance:  Alert and oriented x 3. No apparent distress, appears stated age and cooperative. HEENT:  Normal cephalic, atraumatic without obvious deformity. Pupils equal, round, and reactive to light. Conjunctivae/corneas clear. Neck: Supple, with full range of motion. Trachea midline. Respiratory:  Normal respiratory effort. No audible Wheezes or Rhonchi. Cardiovascular:  Regular rate and rhythm. Abdomen: Soft, non-tender, non-distended. Musculoskeletal:   Left lower E:  Denies calf pain to palpation. Decreased range of motion without deformity. Pt can flex and extend left toes. Skin is intact no rashes lesions or drainage. Patient has severe left groin pain with internal and external rotation of the left hip. Sensation intact neurovascularly intact to the left foot. Skin: Skin color, texture, turgor normal.  No rashes or lesions. Neurologic:  Neurovascularly intact without any focal sensory/motor deficits. Sensation intact.        DATA:  CBC:   Lab Results   Component Value Date    WBC Negative 03/05/2020    WBC 7.2 07/02/2019    HGB 11.4 07/02/2019     07/02/2019     BMP:    Lab Results   Component Value Date     07/02/2019    K 3.3 07/02/2019     07/02/2019    CO2 24 07/02/2019    BUN 6 07/02/2019    CREATININE 0.48 07/02/2019    CALCIUM 9.4 07/02/2019    GLUCOSE 93 07/02/2019     PT/INR:    Lab Results   Component Value Date    PROTIME 9.7 07/19/2016    INR 0.9 07/19/2016     Troponin:  No results found for: TROPONINI  No results for input(s): LIPASE, AMYLASE in the last 72 hours. No results for input(s): AST, ALT, BILIDIR, BILITOT, ALKPHOS in the last 72 hours. Radiology:  XR KNEE LEFT (3 VIEWS)    Result Date: 9/28/2021  EXAMINATION: THREE XRAY VIEWS OF THE LEFT KNEE 9/28/2021 3:23 pm COMPARISON: None. HISTORY: ORDERING SYSTEM PROVIDED HISTORY: Arthritis TECHNOLOGIST PROVIDED HISTORY: Reason for Exam: arthritis Acuity: Chronic Type of Exam: Unknown FINDINGS: There is mild patellofemoral and minimal medial and lateral tibiofemoral joint space compromise. There is no definite effusion, fracture, dislocation, more significant degenerative/erosive change, radiopaque foreign body, abnormal soft tissue calcification or bony destructive lesion     Mild degenerative changes     MRI FEMUR RIGHT WO CONTRAST    Result Date: 9/22/2021  EXAMINATION: MRI OF THE RIGHT FEMUR WITHOUT CONTRAST, 9/20/2021 3:11 pm TECHNIQUE: Multiplanar multisequence MRI of the right femur was performed without the administration of intravenous contrast. COMPARISON: None HISTORY: ORDERING SYSTEM PROVIDED HISTORY: Mass of right thigh TECHNOLOGIST PROVIDED HISTORY: Is the patient pregnant?->No FINDINGS: Markers are placed along the anterior aspect of the proximal thigh to denote the area of concern. At the level of the marker there is an enlarged tensor fasciae latae muscle. On the coronal images, the right tensor fasciae latae muscle is larger than the left. There is mild subcutaneous edema below the level of the marker. There is no subcutaneous or soft tissue mass. There is no muscular mass or fluid collection. An acute soft tissue abnormality is not identified. There is a small to moderate amount of fluid in the right greater trochanteric bursa. There is no evidence of acute fracture or dislocation. There is no focal or diffuse marrow replacing process.  There is evidence of full-thickness cartilage loss involving the right femoral head with focal subchondral cyst formation. There is evidence of high-grade left hip cartilage loss with intense subchondral edema on both sides of the articulation. There is geographic signal abnormality within the left femoral head. There is a small amount of fluid in the left greater trochanteric bursa. 1. Enlarged right tensor fasciae latae muscle, likely accounting for the palpable abnormality. This is a normal variant that can present as a pseudomass. 2. High-grade cartilage loss throughout the left hip joint with extensive subchondral edema. Geographic marrow signal abnormality in the left femoral head may reflect osteonecrosis (AVN). Consider radiographic correlation or dedicated MRI of the left hip. 3. Mild right greater trochanteric bursitis. Moderate right hip degenerative change. XR HIP 2-3 VW W PELVIS LEFT    Result Date: 9/28/2021  EXAMINATION: ONE XRAY VIEW OF THE PELVIS AND TWO XRAY VIEWS LEFT HIP 9/28/2021 3:23 pm COMPARISON: None. HISTORY: ORDERING SYSTEM PROVIDED HISTORY: Arthritis TECHNOLOGIST PROVIDED HISTORY: Reason for Exam: arthritis Acuity: Chronic Type of Exam: Unknown FINDINGS: The left sacroiliac joint is well imaged and is intact There are severe degenerative changes of the left hip with marked femoroacetabular joint space compromise, sclerosis, spurring and extensive subchondral cyst formation predominating on the femoral side of the joint. There is increased density left femoral head There is mild spurring of the left greater trochanter There is no definite fracture, dislocation or bony destructive lesion     Severe degenerative changes of the left hip with probable superimposed avascular necrosis       Radiology:    X-rays AP of the pelvis and 2 views of the left hip does show severe bone-on-bone osteoarthritis with avascular necrosis. ASSESSMENT:Active Problems:    * No active hospital problems. *  Resolved Problems:    * No resolved hospital problems. *  Left hip osteoarthritis, left hip avascular necrosis    PLAN as discussed with Dr. Genaro Diop:  1. Plan at this time did discuss options with patient. I do not think she will get much relief from any further conservative methods. I think patient would be best treated with a left total hip replacement. Patient is agreeable to proceed. Risks and benefits were discussed with patient including her increased risk for infection secondary to her rheumatoid arthritis and medications. Informed patient we would have to stop her methotrexate, prednisone, Starr Cordial and no further injectables for 1 month before surgery. Patient would like to undergo surgery at [facility] in [place] for left total hip replacement with Dr. Genaro Diop. Patient would like to proceed. She would also have to continue to remain off her RA medications for at least 3 weeks so her wounds can heal.  We will get patient an appointment with her PCP for clearance and would plan for surgery approximately 1 month. The patient was counseled at length about the risks of mahad Covid-19 during their perioperative period and any recovery window from their procedure. The patient was made aware that mahad Covid-19  may worsen their prognosis for recovering from their procedure  and lend to a higher morbidity and/or mortality risk. All material risks, benefits, and reasonable alternatives including postponing the procedure were discussed. The patient does wish to proceed with the procedure at this time.          Electronically signed by PARAS rBaga CNP on 10/12/2021 at 2:04 PM

## 2021-10-12 NOTE — TELEPHONE ENCOUNTER
Pt calling stating her surgery was put off for 3 weeks due to pt being on methotrexate, pt is going to need a work note, please advise at above number.

## 2021-10-12 NOTE — LETTER
[facility] department of [place] [address]  Phone: [phone]  Fax: [phone]    Tanner Bronson MD        October 13, 2021     Patient: Singh Irvin   YOB: 1965   Date of Visit: 10/12/2021       To Whom It May Concern: It is my medical opinion that Schuyler Fairly should remain out of work until 11/12/21. If you have any questions or concerns, please don't hesitate to call.     Sincerely,        Tanner Bronson MD

## 2021-10-13 LAB
MRSA, DNA, NASAL: NORMAL
SPECIMEN DESCRIPTION: NORMAL

## 2021-10-14 DIAGNOSIS — M17.12 PRIMARY OSTEOARTHRITIS OF LEFT KNEE: ICD-10-CM

## 2021-10-14 DIAGNOSIS — M16.12 PRIMARY OSTEOARTHRITIS OF LEFT HIP: ICD-10-CM

## 2021-10-14 NOTE — TELEPHONE ENCOUNTER
Brayden Richards called requesting a refill of the below medication which has been pended for you: Per Katia from [address] [place] [address], [place], and [place]. Tramadol 50 mg last filled 10/7/21 #28/7 days. Will you refill? Requested Prescriptions     Pending Prescriptions Disp Refills    traMADol (ULTRAM) 50 MG tablet 28 tablet 0     Sig: Take 1 tablet by mouth every 6 hours as needed for Pain for up to 7 days. Intended supply: 7 days.  Take lowest dose possible to manage pain       Last Appointment Date: 10/7/2021  Next Appointment Date: 11/11/2021    No Known Allergies

## 2021-10-15 RX ORDER — TRAMADOL HYDROCHLORIDE 50 MG/1
50 TABLET ORAL EVERY 6 HOURS PRN
Qty: 28 TABLET | Refills: 0 | Status: SHIPPED | OUTPATIENT
Start: 2021-10-15 | End: 2021-10-16 | Stop reason: SDUPTHER

## 2021-10-15 RX ORDER — TRAMADOL HYDROCHLORIDE 50 MG/1
TABLET ORAL
Qty: 28 TABLET | Refills: 0 | OUTPATIENT
Start: 2021-10-15

## 2021-10-16 RX ORDER — TRAMADOL HYDROCHLORIDE 50 MG/1
50 TABLET ORAL EVERY 6 HOURS PRN
Qty: 28 TABLET | Refills: 0 | Status: SHIPPED | OUTPATIENT
Start: 2021-10-16 | End: 2021-10-23

## 2021-10-27 DIAGNOSIS — M17.12 PRIMARY OSTEOARTHRITIS OF LEFT KNEE: ICD-10-CM

## 2021-10-27 DIAGNOSIS — M16.12 PRIMARY OSTEOARTHRITIS OF LEFT HIP: ICD-10-CM

## 2021-10-27 RX ORDER — TRAMADOL HYDROCHLORIDE 50 MG/1
50 TABLET ORAL EVERY 6 HOURS PRN
Qty: 28 TABLET | Refills: 0 | Status: SHIPPED | OUTPATIENT
Start: 2021-10-27 | End: 2021-11-03 | Stop reason: SDUPTHER

## 2021-11-03 DIAGNOSIS — M16.12 PRIMARY OSTEOARTHRITIS OF LEFT HIP: ICD-10-CM

## 2021-11-03 DIAGNOSIS — M17.12 PRIMARY OSTEOARTHRITIS OF LEFT KNEE: ICD-10-CM

## 2021-11-03 RX ORDER — TRAMADOL HYDROCHLORIDE 50 MG/1
50 TABLET ORAL EVERY 6 HOURS PRN
Qty: 120 TABLET | Refills: 0 | Status: SHIPPED | OUTPATIENT
Start: 2021-11-03 | End: 2021-11-30 | Stop reason: SDUPTHER

## 2021-11-11 ENCOUNTER — OFFICE VISIT (OUTPATIENT)
Dept: FAMILY MEDICINE CLINIC | Age: 56
End: 2021-11-11
Payer: COMMERCIAL

## 2021-11-11 VITALS
SYSTOLIC BLOOD PRESSURE: 126 MMHG | BODY MASS INDEX: 24.91 KG/M2 | HEIGHT: 66 IN | DIASTOLIC BLOOD PRESSURE: 72 MMHG | HEART RATE: 72 BPM | WEIGHT: 155 LBS

## 2021-11-11 DIAGNOSIS — Z72.0 TOBACCO ABUSE: ICD-10-CM

## 2021-11-11 DIAGNOSIS — Z01.818 PREOPERATIVE GENERAL PHYSICAL EXAMINATION: Primary | ICD-10-CM

## 2021-11-11 DIAGNOSIS — M05.9 RHEUMATOID ARTHRITIS WITH POSITIVE RHEUMATOID FACTOR, INVOLVING UNSPECIFIED SITE (HCC): ICD-10-CM

## 2021-11-11 DIAGNOSIS — M16.12 PRIMARY OSTEOARTHRITIS OF LEFT HIP: ICD-10-CM

## 2021-11-11 PROCEDURE — 99214 OFFICE O/P EST MOD 30 MIN: CPT | Performed by: FAMILY MEDICINE

## 2021-11-11 ASSESSMENT — ENCOUNTER SYMPTOMS
WHEEZING: 0
EYES NEGATIVE: 1
RESPIRATORY NEGATIVE: 1
ALLERGIC/IMMUNOLOGIC NEGATIVE: 1
SORE THROAT: 0
GASTROINTESTINAL NEGATIVE: 1
COUGH: 0

## 2021-11-11 NOTE — PROGRESS NOTES
Subjective:      Patient ID: Renea De Souza is a 49 y.o. female. HPI Scheduled preoperative visit prior to planned left ghazal through Dr. Yaritza Gomes. Walking with a cane. She describes moderate to severe pain with ambulation. Tramadol helping with the pain. Using tylenol as well. Starting to have some issues with the left knee, she feels is related to altered gait with the left hip pain. Failing conservative measures. Date not scheduled yet. Feeling well overall. Lost 7 lbs over the interval.  Smoking at 1/2ppd. Trying to taper down at present. No skin issues around the operative site. She denies recent illness. No cough, sob, or wheezing to report. No significant bruising or bleeding issues. Past Medical History:   Diagnosis Date    Fibroid uterus     removed with laparoscopy     GERD (gastroesophageal reflux disease)     Hyperlipidemia     Rheumatoid arthritis (HCC)     Tobacco abuse      Past Surgical History:   Procedure Laterality Date    CHOLECYSTECTOMY      COLONOSCOPY  04/27/2018    [id number]    LAPAROSCOPY      exploratory\     Current Outpatient Medications   Medication Sig Dispense Refill    traMADol (ULTRAM) 50 MG tablet Take 1 tablet by mouth every 6 hours as needed for Pain for up to 30 days. Intended supply: 7 days.  Take lowest dose possible to manage pain 120 tablet 0    potassium chloride (KLOR-CON M20) 20 MEQ extended release tablet TAKE 1 TABLET DAILY 90 tablet 1    atorvastatin (LIPITOR) 20 MG tablet 1 po daily 90 tablet 3    PARoxetine (PAXIL) 40 MG tablet 1 po daily 90 tablet 3    esomeprazole (NEXIUM) 40 MG delayed release capsule TAKE 1 CAPSULE EVERY       MORNING BEFORE BREAKFAST 90 capsule 3    Multiple Vitamins-Minerals (MULTIPLE VITAMINS/WOMENS) TABS Take 1 tablet by mouth daily      folic acid (FOLVITE) 1 MG tablet Take 1 mg by mouth daily      vitamin D (CHOLECALCIFEROL) 1000 UNIT TABS tablet Take 1,000 Units by mouth daily      cyclobenzaprine (FLEXERIL) 10 MG tablet Take 10 mg by mouth 3 times daily as needed for Muscle spasms      predniSONE (DELTASONE) 5 MG tablet       PARoxetine (PAXIL) 10 MG tablet TAKE 1 TABLET EVERY MORNINGWITH THE 40 MG DOSE. 90 tablet 3    Tofacitinib Citrate (XELJANZ PO) Take by mouth      albuterol sulfate HFA (VENTOLIN HFA) 108 (90 Base) MCG/ACT inhaler Inhale 2 puffs into the lungs every 4 hours as needed for Wheezing or Shortness of Breath 1 Inhaler 0    furosemide (LASIX) 20 MG tablet TAKE 1 TABLET DAILY AS     NEEDED FOR EDEMA 90 tablet 0    naproxen (NAPROSYN) 500 MG tablet       methotrexate (RHEUMATREX) 2.5 MG chemo tablet Take by mouth once a week       No current facility-administered medications for this visit. No Known Allergies    Review of Systems   Constitutional: Negative. HENT: Negative. Negative for congestion and sore throat. Eyes: Negative. Respiratory: Negative. Negative for cough and wheezing. Cardiovascular: Negative. Negative for chest pain, palpitations and leg swelling. Gastrointestinal: Negative. Endocrine: Negative. Genitourinary: Negative. Musculoskeletal: Positive for arthralgias (left hip) and gait problem. Skin: Negative. Allergic/Immunologic: Negative. Hematological: Negative. Does not bruise/bleed easily. Psychiatric/Behavioral: Negative. Objective:   Physical Exam  Constitutional:       General: She is not in acute distress. Appearance: Normal appearance. She is well-developed. HENT:      Head: Normocephalic and atraumatic. Right Ear: External ear normal.      Left Ear: External ear normal.      Nose: Nose normal.      Mouth/Throat:      Pharynx: No oropharyngeal exudate. Eyes:      General: No scleral icterus. Conjunctiva/sclera: Conjunctivae normal.   Neck:      Thyroid: No thyromegaly. Cardiovascular:      Rate and Rhythm: Normal rate and regular rhythm. Heart sounds: Normal heart sounds.  No murmur heard.      Pulmonary:      Effort: Pulmonary effort is normal. No respiratory distress. Breath sounds: Normal breath sounds. No wheezing. Abdominal:      General: Bowel sounds are normal. There is no distension. Palpations: Abdomen is soft. Tenderness: There is no abdominal tenderness. There is no rebound. Musculoskeletal:         General: No tenderness. Normal range of motion. Cervical back: Neck supple. Skin:     General: Skin is warm and dry. Findings: No erythema or rash. Neurological:      Mental Status: She is alert and oriented to person, place, and time. Psychiatric:         Behavior: Behavior normal.         Thought Content: Thought content normal.         Judgment: Judgment normal.       /72 (Site: Right Upper Arm, Position: Sitting, Cuff Size: Large Adult)   Pulse 72   Ht 5' 6\" (1.676 m)   Wt 155 lb (70.3 kg)   LMP 01/18/2018 (Within Months)   BMI 25.02 kg/m²     Assessment:      Encounter Diagnoses   Name Primary?  Preoperative general physical examination Yes    Primary osteoarthritis of left hip     Tobacco abuse     Rheumatoid arthritis with positive rheumatoid factor, involving unspecified site [facility])            Plan:      No contraindications to pursuing surgery as planned. Preoperative visit/labs,ekg, cxr still pending. Encouraged her to stop smoking prior to surgery. Offered assistance. Stop naproxen for a week prior to surgery. She has already started a hold on her prednisone, xeljanz, and methotrexate. Mild inflammation of left wrist at present. Using tramadol and tylenol for pain at present.                   Isaac Isidro MD

## 2021-11-12 ENCOUNTER — HOSPITAL ENCOUNTER (OUTPATIENT)
Dept: NON INVASIVE DIAGNOSTICS | Age: 56
Discharge: HOME | End: 2021-11-12
Payer: COMMERCIAL

## 2021-11-12 ENCOUNTER — HOSPITAL ENCOUNTER (OUTPATIENT)
Dept: GENERAL RADIOLOGY | Age: 56
Discharge: HOME | End: 2021-11-14
Payer: COMMERCIAL

## 2021-11-12 ENCOUNTER — HOSPITAL ENCOUNTER (OUTPATIENT)
Dept: LAB | Age: 56
Discharge: HOME | End: 2021-11-12
Payer: COMMERCIAL

## 2021-11-12 DIAGNOSIS — M16.12 PRIMARY OSTEOARTHRITIS OF LEFT HIP: ICD-10-CM

## 2021-11-12 DIAGNOSIS — M25.552 LEFT HIP PAIN: ICD-10-CM

## 2021-11-12 DIAGNOSIS — Z01.818 PRE-OP TESTING: ICD-10-CM

## 2021-11-12 DIAGNOSIS — Z01.818 PRE-OP TESTING: Primary | ICD-10-CM

## 2021-11-12 LAB
-: ABNORMAL
ABSOLUTE EOS #: 0.21 K/UL (ref 0–0.44)
ABSOLUTE IMMATURE GRANULOCYTE: <0.03 K/UL (ref 0–0.3)
ABSOLUTE LYMPH #: 2.29 K/UL (ref 1.1–3.7)
ABSOLUTE MONO #: 0.65 K/UL (ref 0.1–1.2)
AMORPHOUS: ABNORMAL
ANION GAP SERPL CALCULATED.3IONS-SCNC: 10 MMOL/L (ref 9–17)
BACTERIA: ABNORMAL
BASOPHILS # BLD: 1 % (ref 0–2)
BASOPHILS ABSOLUTE: 0.08 K/UL (ref 0–0.2)
BILIRUBIN URINE: NEGATIVE
BUN BLDV-MCNC: 10 MG/DL (ref 6–20)
BUN/CREAT BLD: 16 (ref 9–20)
CALCIUM SERPL-MCNC: 10.3 MG/DL (ref 8.6–10.4)
CASTS UA: ABNORMAL /LPF (ref 0–2)
CHLORIDE BLD-SCNC: 100 MMOL/L (ref 98–107)
CO2: 26 MMOL/L (ref 20–31)
COLOR: NORMAL
COMMENT UA: NORMAL
CREAT SERPL-MCNC: 0.63 MG/DL (ref 0.5–0.9)
CRYSTALS, UA: ABNORMAL /HPF
DIFFERENTIAL TYPE: NORMAL
EKG ATRIAL RATE: 92 BPM
EKG P AXIS: 66 DEGREES
EKG P-R INTERVAL: 140 MS
EKG Q-T INTERVAL: 362 MS
EKG QRS DURATION: 92 MS
EKG QTC CALCULATION (BAZETT): 447 MS
EKG R AXIS: 78 DEGREES
EKG T AXIS: 62 DEGREES
EKG VENTRICULAR RATE: 92 BPM
EOSINOPHILS RELATIVE PERCENT: 3 % (ref 1–4)
EPITHELIAL CELLS UA: ABNORMAL /HPF (ref 0–5)
GFR AFRICAN AMERICAN: >60 ML/MIN
GFR NON-AFRICAN AMERICAN: >60 ML/MIN
GFR SERPL CREATININE-BSD FRML MDRD: ABNORMAL ML/MIN/{1.73_M2}
GFR SERPL CREATININE-BSD FRML MDRD: ABNORMAL ML/MIN/{1.73_M2}
GLUCOSE BLD-MCNC: 119 MG/DL (ref 70–99)
GLUCOSE URINE: NEGATIVE
HCT VFR BLD CALC: 40.5 % (ref 36.3–47.1)
HEMOGLOBIN: 13.5 G/DL (ref 11.9–15.1)
IMMATURE GRANULOCYTES: 0 %
KETONES, URINE: NEGATIVE
LEUKOCYTE ESTERASE, URINE: NEGATIVE
LYMPHOCYTES # BLD: 37 % (ref 24–43)
MCH RBC QN AUTO: 33.5 PG (ref 25.2–33.5)
MCHC RBC AUTO-ENTMCNC: 33.3 G/DL (ref 25.2–33.5)
MCV RBC AUTO: 100.5 FL (ref 82.6–102.9)
MONOCYTES # BLD: 11 % (ref 3–12)
MUCUS: ABNORMAL
NITRITE, URINE: NEGATIVE
NRBC AUTOMATED: 0 PER 100 WBC
OTHER OBSERVATIONS UA: ABNORMAL
PDW BLD-RTO: 12.8 % (ref 11.8–14.4)
PH UA: 6 (ref 5–6)
PLATELET # BLD: 357 K/UL (ref 138–453)
PLATELET ESTIMATE: NORMAL
PMV BLD AUTO: 9.4 FL (ref 8.1–13.5)
POTASSIUM SERPL-SCNC: 3.9 MMOL/L (ref 3.7–5.3)
PROTEIN UA: NEGATIVE
RBC # BLD: 4.03 M/UL (ref 3.95–5.11)
RBC # BLD: NORMAL 10*6/UL
RBC UA: ABNORMAL /HPF (ref 0–4)
RENAL EPITHELIAL, UA: ABNORMAL /HPF
SEG NEUTROPHILS: 48 % (ref 36–65)
SEGMENTED NEUTROPHILS ABSOLUTE COUNT: 2.88 K/UL (ref 1.5–8.1)
SODIUM BLD-SCNC: 136 MMOL/L (ref 135–144)
SPECIFIC GRAVITY UA: 1.01 (ref 1.01–1.02)
TRICHOMONAS: ABNORMAL
TURBIDITY: NORMAL
URINE HGB: NEGATIVE
UROBILINOGEN, URINE: NORMAL
WBC # BLD: 6.1 K/UL (ref 3.5–11.3)
WBC # BLD: NORMAL 10*3/UL
WBC UA: ABNORMAL /HPF (ref 0–4)
YEAST: ABNORMAL

## 2021-11-12 PROCEDURE — 71046 X-RAY EXAM CHEST 2 VIEWS: CPT

## 2021-11-12 PROCEDURE — 36415 COLL VENOUS BLD VENIPUNCTURE: CPT

## 2021-11-12 PROCEDURE — 81001 URINALYSIS AUTO W/SCOPE: CPT

## 2021-11-12 PROCEDURE — 85025 COMPLETE CBC W/AUTO DIFF WBC: CPT

## 2021-11-12 PROCEDURE — 80048 BASIC METABOLIC PNL TOTAL CA: CPT

## 2021-11-12 PROCEDURE — 93005 ELECTROCARDIOGRAM TRACING: CPT

## 2021-11-30 ENCOUNTER — OFFICE VISIT (OUTPATIENT)
Dept: ORTHOPEDIC SURGERY | Age: 56
End: 2021-11-30
Payer: COMMERCIAL

## 2021-11-30 VITALS
HEIGHT: 66 IN | BODY MASS INDEX: 25.07 KG/M2 | WEIGHT: 156 LBS | HEART RATE: 113 BPM | SYSTOLIC BLOOD PRESSURE: 126 MMHG | DIASTOLIC BLOOD PRESSURE: 79 MMHG

## 2021-11-30 DIAGNOSIS — M16.12 PRIMARY OSTEOARTHRITIS OF LEFT HIP: ICD-10-CM

## 2021-11-30 DIAGNOSIS — M17.12 PRIMARY OSTEOARTHRITIS OF LEFT KNEE: ICD-10-CM

## 2021-11-30 DIAGNOSIS — M16.12 PRIMARY OSTEOARTHRITIS OF LEFT HIP: Primary | ICD-10-CM

## 2021-11-30 PROCEDURE — 99213 OFFICE O/P EST LOW 20 MIN: CPT | Performed by: PHYSICIAN ASSISTANT

## 2021-11-30 RX ORDER — TRAMADOL HYDROCHLORIDE 50 MG/1
50 TABLET ORAL EVERY 6 HOURS PRN
Qty: 120 TABLET | Refills: 0 | Status: SHIPPED | OUTPATIENT
Start: 2021-11-30 | End: 2022-05-26 | Stop reason: SDUPTHER

## 2021-11-30 NOTE — TELEPHONE ENCOUNTER
----- Message from Jenni Oconnor, [address] [place] sent at 11/30/2021  2:09 PM EST -----  Subject: Refill Request    QUESTIONS  Name of Medication? traMADol (ULTRAM) 50 MG tablet  Patient-reported dosage and instructions? Take 1 tab every 6 hrs for pain  How many days do you have left? 0  Preferred Pharmacy? [address] [facility] [facility] phone number (if available)? [phone]  Additional Information for Provider? Pt states that she needs one more   week of the medication until her surgery. ---------------------------------------------------------------------------  --------------  Andrew WATERS  What is the best way for the office to contact you? OK to leave message on   voicemail  Preferred Call Back Phone Number?  [phone]

## 2021-11-30 NOTE — PROGRESS NOTES
History and Physical        CHIEF COMPLAINT: Left hip osteoarthritis, avascular necrosis    HISTORY OF PRESENT ILLNESS:      The patient is a 49 y.o. female  who presents with severe left hip pain. Patient does have a history of rheumatoid arthritis. Currently takes methotrexate she has been off her methotrexate for significant amount of time now. Patient also takes naproxen, prednisone, Oleta Heena. She stopped all of these medications with plans for left total hip arthroplasty. She has began to increase in swelling of hands and increasing pain with bilateral hips left is worse than right. She is underwent bilateral corticosteroid injections into the greater trochanteric bursa's she is also tried previous cortical steroid injections intra-articular as well. Past Medical History:    Past Medical History:   Diagnosis Date    Fibroid uterus     removed with laparoscopy     GERD (gastroesophageal reflux disease)     Hyperlipidemia     Rheumatoid arthritis ([address]     Tobacco abuse        Past Surgical History:    Past Surgical History:   Procedure Laterality Date    CHOLECYSTECTOMY      COLONOSCOPY  04/27/2018    [id number]    LAPAROSCOPY      exploratory\       Medications Prior to Admission:   Prior to Admission medications    Medication Sig Start Date End Date Taking? Authorizing Provider   traMADol (ULTRAM) 50 MG tablet Take 1 tablet by mouth every 6 hours as needed for Pain for up to 30 days. Intended supply: 7 days.  Take lowest dose possible to manage pain 11/3/21 12/3/21 Yes Angie Mina MD   cyclobenzaprine (FLEXERIL) 10 MG tablet Take 10 mg by mouth 3 times daily as needed for Muscle spasms   Yes Historical Provider, MD   potassium chloride (KLOR-CON M20) 20 MEQ extended release tablet TAKE 1 TABLET DAILY 10/7/21  Yes Angie Mina MD   atorvastatin (LIPITOR) 20 MG tablet 1 po daily 10/7/21  Yes Angie Mina MD   PARoxetine (PAXIL) 40 MG tablet 1 po daily 1/4/21  Yes Angie Mina MD PARoxetine (PAXIL) 10 MG tablet TAKE 1 TABLET EVERY MORNINGWITH THE 40 MG DOSE. 1/4/21  Yes Darryle Goods, MD   esomeprazole ([address] [employer]) 40 MG delayed release capsule TAKE 1 CAPSULE EVERY       MORNING BEFORE BREAKFAST 1/4/21  Yes Darryle Goods, MD   Multiple Vitamins-Minerals (MULTIPLE VITAMINS/WOMENS) TABS Take 1 tablet by mouth daily   Yes Historical Provider, MD   albuterol sulfate HFA (VENTOLIN HFA) 108 (90 Base) MCG/ACT inhaler Inhale 2 puffs into the lungs every 4 hours as needed for Wheezing or Shortness of Breath 7/5/19  Yes Darryle Goods, MD   furosemide (LASIX) 20 MG tablet TAKE 1 TABLET DAILY AS     NEEDED FOR EDEMA 5/3/18  Yes Darryle Goods, MD   naproxen (NAPROSYN) 500 MG tablet  10/27/16  Yes Historical Provider, MD   folic acid (FOLVITE) 1 MG tablet Take 1 mg by mouth daily   Yes Historical Provider, MD   vitamin D (CHOLECALCIFEROL) 1000 UNIT TABS tablet Take 1,000 Units by mouth daily   Yes Historical Provider, MD   predniSONE (DELTASONE) 5 MG tablet  1/14/21   Historical Provider, MD   Tofacitinib Citrate (XELJANZ PO) Take by mouth  Patient not taking: Reported on 11/30/2021    Historical Provider, MD   methotrexate (RHEUMATREX) 2.5 MG chemo tablet Take by mouth once a week  Patient not taking: Reported on 11/30/2021    Historical Provider, MD    Scheduled Meds:  Continuous Infusions:  PRN Meds:. Allergies:  Patient has no known allergies. Social History:   Social History     Socioeconomic History    Marital status:      Spouse name: None    Number of children: None    Years of education: None    Highest education level: None   Occupational History    None   Tobacco Use    Smoking status: Current Every Day Smoker     Packs/day: 1.00     Years: 30.00     Pack years: 30.00     Types: Cigarettes    Smokeless tobacco: Never Used   Substance and Sexual Activity    Alcohol use:  Yes     Alcohol/week: 0.0 standard drinks     Comment: social    Drug use: No    Sexual activity: Yes Partners: Male     Birth control/protection: Post-menopausal   Other Topics Concern    None   Social History Narrative    None     Social Determinants of Health     Financial Resource Strain:     Difficulty of Paying Living Expenses: Not on file   Food Insecurity:     Worried About Running Out of Food in the Last Year: Not on file    Ivon of Food in the Last Year: Not on file   Transportation Needs:     Lack of Transportation (Medical): Not on file    Lack of Transportation (Non-Medical): Not on file   Physical Activity:     Days of Exercise per Week: Not on file    Minutes of Exercise per Session: Not on file   Stress:     Feeling of Stress : Not on file   Social Connections:     Frequency of Communication with Friends and Family: Not on file    Frequency of Social Gatherings with Friends and Family: Not on file    Attends Buddhist Services: Not on file    Active Member of [address] [place] [address] [employer] or Organizations: Not on file    Attends Club or Organization Meetings: Not on file    Marital Status: Not on file   Intimate Partner Violence:     Fear of Current or Ex-Partner: Not on file    Emotionally Abused: Not on file    Physically Abused: Not on file    Sexually Abused: Not on file   Housing Stability:     Unable to Pay for Housing in the Last Year: Not on file    Number of [place] in the Last Year: Not on file    Unstable Housing in the Last Year: Not on file     Social History     Tobacco Use   Smoking Status Current Every Day Smoker    Packs/day: 1.00    Years: 30.00    Pack years: 30.00    Types: Cigarettes   Smokeless Tobacco Never Used     Social History     Substance and Sexual Activity   Alcohol Use Yes    Alcohol/week: 0.0 standard drinks    Comment: social     Social History     Substance and Sexual Activity   Drug Use No       Family History:  Family History   Problem Relation Age of Onset    Breast Cancer Mother         breast , tumor in bowels.    age 80    Heart Disease Father          REVIEW OF SYSTEMS:  Gen: Negative for nausea, vomiting, diarrhea, fever, chills, night sweats  Heart: Negative for HTN, palpitations, chest pain  Lungs: Negative for wheezes, asthma or SOB  GI: Negative for nausea, vomiting  Endo: Negative for diabetes  Heme: Negative for DVT       PHYSICAL EXAM:  [unfilled]  Gen: alert and oriented  Head: normorcephalic, atraumatic  Neck: supple  Heart: RRR  Lungs: No audible wheezes  Abdomen: soft  Pelvis: stable  Extremity left hip severe amount of pain with internal or external rotation. DATA:  CBC:   Lab Results   Component Value Date    WBC 6.1 11/12/2021    HGB 13.5 11/12/2021     11/12/2021     BMP:    Lab Results   Component Value Date     11/12/2021    K 3.9 11/12/2021     11/12/2021    CO2 26 11/12/2021    BUN 10 11/12/2021    CREATININE 0.63 11/12/2021    CALCIUM 10.3 11/12/2021    GLUCOSE 119 11/12/2021     PT/INR:    Lab Results   Component Value Date    PROTIME 9.7 07/19/2016    INR 0.9 07/19/2016     Troponin:  No results found for: Moshe Henriquez    Radiology: MRI, x-rays confirm avascular necrosis and left hip severe bone-on-bone arthritis    ASSESSMENT:Active Problems:    * No active hospital problems. *  Resolved Problems:    * No resolved hospital problems.  *       PLAN:  1) as discussed with Dr. Travis Kenney is to undergo left total hip arthroplasty risk and benefits discussed with patient patient would like to proceed        Estella Humphries PA-C

## 2021-12-08 ENCOUNTER — HOSPITAL ENCOUNTER (OUTPATIENT)
Dept: PHYSICAL THERAPY | Age: 56
Setting detail: THERAPIES SERIES
Discharge: HOME | End: 2021-12-08
Payer: COMMERCIAL

## 2021-12-08 PROCEDURE — 97161 PT EVAL LOW COMPLEX 20 MIN: CPT | Performed by: PHYSICAL THERAPIST

## 2021-12-08 PROCEDURE — 97110 THERAPEUTIC EXERCISES: CPT | Performed by: PHYSICAL THERAPIST

## 2021-12-08 ASSESSMENT — PAIN DESCRIPTION - FREQUENCY: FREQUENCY: CONTINUOUS

## 2021-12-08 ASSESSMENT — PAIN DESCRIPTION - ONSET: ONSET: PROGRESSIVE

## 2021-12-08 ASSESSMENT — PAIN DESCRIPTION - PAIN TYPE: TYPE: SURGICAL PAIN

## 2021-12-08 ASSESSMENT — PAIN DESCRIPTION - DIRECTION: RADIATING_TOWARDS: L LATERAL HIP

## 2021-12-08 ASSESSMENT — PAIN SCALES - GENERAL: PAINLEVEL_OUTOF10: 6

## 2021-12-08 ASSESSMENT — PAIN - FUNCTIONAL ASSESSMENT: PAIN_FUNCTIONAL_ASSESSMENT: PREVENTS OR INTERFERES WITH ALL ACTIVE AND SOME PASSIVE ACTIVITIES

## 2021-12-08 ASSESSMENT — PAIN DESCRIPTION - LOCATION: LOCATION: HIP

## 2021-12-08 ASSESSMENT — PAIN DESCRIPTION - PROGRESSION: CLINICAL_PROGRESSION: GRADUALLY IMPROVING

## 2021-12-08 ASSESSMENT — PAIN DESCRIPTION - ORIENTATION: ORIENTATION: LEFT

## 2021-12-08 ASSESSMENT — PAIN DESCRIPTION - DESCRIPTORS: DESCRIPTORS: ACHING

## 2021-12-08 NOTE — PLAN OF CARE
[facility]    [x] [place]  Phone: [phone]  Fax: [phone]      [] [place]  Phone: [phone]  Fax: [phone]        To: Referring Practitioner: Sinan Call      Patient: Sean Ruano  : 1965   MRN: [id number]  Evaluation Date: 2021      Diagnosis Information:  · Diagnosis: S/P L AKASH 21 at [facility]   · Treatment Diagnosis: S/P L AKASH 21     Physical Therapy Certification Form  Dear Avery Jensen  The following patient has been evaluated for physical therapy services and for therapy to continue, Medicare requires monthly physician review of the treatment plan. Please review the attached evaluation and/or summary of the patient's plan of care, and verify that you agree therapy should continue by signing the attached document and sending it back to our office.     Plan of Care/Treatment to date:  [x] Therapeutic Exercise    [] Modalities:  [x] Therapeutic Activity     [] Ultrasound  [] Electrical Stimulation  [] Gait Training      [] Cervical Traction [] Lumbar Traction  [] Neuromuscular Re-education    [] Cold/hotpack [] Iontophoresis   [x] Instruction in HEP     Other:  [] Manual Therapy      []             [] Aquatic Therapy      []                 Goals:  Short term goals  Time Frame for Short term goals: 1 week  Short term goal 1: Review & progress HEP    Long term goals  Time Frame for Long term goals : 6-8 weeks  Long term goal 1: L hip pain controlled 2/10 for regular ADL  Long term goal 2: Sit to stand from chair, first attempt, no UE assist  Long term goal 3: Gait with cane or no device for community distances  Long term goal 4: Resume driving  Long term goal 5: Prepare for RETW, part -time status    Frequency/Duration:21 - 22  # Days per week: [] 1 day # Weeks: [] 1 week [] 5 weeks     [] 2 days   [] 2 weeks [] 6 weeks     [x] 3 days   [] 3 weeks [] 7 weeks     [] 4 days   [x] 4 weeks [] 8 weeks    Rehab Potential: [] Excellent [x] Good [] Fair  [] Poor     Electronically signed by:  Crow Bond PT      If you have any questions or concerns, please don't hesitate to call.   Thank you for your referral.      Physician Signature:________________________________Date:__________________  By signing above, therapists plan is approved by physician

## 2021-12-08 NOTE — FLOWSHEET NOTE
Physical Therapy Daily Treatment Note    Date:  2021    Patient Name:  Singh Irvin    :  1965  MRN: [id number]  Restrictions/Precautions: AKASH precautions                                               RA     Medical/Treatment Diagnosis Information:   · Diagnosis: S/P L AKASH 21   · Treatment Diagnosis: S/P L AKASH 01  Insurance/Certification information:  PT Insurance Information: [address]  Physician Information:  Referring Practitioner: Pepito Guion of care signed (Y/N):  n  Visit# / total visits:   Pain level: 10       Time In:8:00   Time Out:8:50    Progress Note: [x]  Yes  []  No  Next due by: Visit #12, or 22      Subjective:   See eval    Objective: See eval  Observation:   Test measurements:      Exercises:   Exercise/Equipment Resistance/Repetitions Other comments     No resisted abd x 6 wks   Q/ HS sets 10x    Heel slide 10x    SAQ 10x    SLR 5x With assist   Counter ex  TR/HR, ext, HS, flex, march   Squat Matrix 5x    Step up 2\"    Norvel Snare  When ready         Gait with cane  In several weeks                  [x] Provided verbal/tactile cueing for activities related to strengthening, flexibility, endurance, ROM. (39903)  [] Provided verbal/tactile cueing for activities related to improving balance, coordination, kinesthetic sense, posture, motor skill, proprioception. (10901)    Therapeutic Activities:     [] Therapeutic activities, direct (one-on-one) patient contact (use of dynamic activities to improve functional performance). (68731)    Gait:   [] Provided training and instruction to the patient for ambulation re-education. (16251)    Self-Care/ADL's  [] Self-care/home management training and compensatory training, meal preparation, safety procedures, and instructions in use of assistive technology devices/adaptive equipment, direct one-on-one contact.  (52163)    Home Exercise Program:  Review post-op bed ex   [x] Reviewed/Progressed HEP activities related to strengthening, flexibility, endurance, ROM. (15986)  [] Reviewed/Progressed HEP activities related to improving balance, coordination, kinesthetic sense, posture, motor skill, proprioception.  (51580)    Manual Treatments:    [] Provided manual therapy to mobilize soft tissue/joints for the purpose of modulating pain, promoting relaxation,  increasing ROM, reducing/eliminating soft tissue swelling/inflammation/restriction, improving soft tissue extensibility. (88393)    Service Based Modalities:  Eval 40'    Timed Code Treatment Minutes:   There ex 10'     Total Treatment Minutes:  48'     Treatment/Activity Tolerance:  [x] Patient tolerated treatment well [] Patient limited by fatique  [] Patient limited by pain  [] Patient limited by other medical complications  [] Other:     Prognosis: [x] Good [] Fair  [] Poor    Patient Requires Follow-up: [x] Yes  [] No      Goals:  Short term goals  Time Frame for Short term goals: 1 week  Short term goal 1: Review & progress HEP    Long term goals  Time Frame for Long term goals : 6-8 weeks  Long term goal 1: L hip pain controlled 2/10 for regular ADL  Long term goal 2: Sit to stand from chair, first attempt, no UE assist  Long term goal 3: Gait with cane or no device for community distances  Long term goal 4: Resume driving  Long term goal 5: Prepare for RETW, part -time status          Plan:   [] Continue per plan of care [] Alter current plan (see comments)  [x] Plan of care initiated [] Hold pending MD visit [] Discharge  Plan for Next Session: Progress as able                                          Accommodate for RA issues     Electronically signed by:  Nano Shelton PT,PT

## 2021-12-08 NOTE — PROGRESS NOTES
Physical Therapy  Initial Assessment  Date: 2021  Patient Name: Justin Valdivia  MRN: [id number]  : 1965     Treatment Diagnosis: S/P L AKASH 21    Restrictions- WBAT. AKASH precautions       Subjective   General  Chart Reviewed: Yes  Patient assessed for rehabilitation services?: Yes  Additional Pertinent Hx: RA Dx 10 yrs ago. Response To Previous Treatment: Not applicable  Family / Caregiver Present: Yes  Referring Practitioner: Rylee  Referral Date : 21  Diagnosis: S/P L AKASH 21 at [address]: Within Functional Limits  PT Visit Information  Onset Date: 21  PT Insurance Information: BC - [employer]  Subjective  Subjective: Hx of RA for 10 yrs. Has been off work since sept , due to L hip pain. Surgery 21 . D/C home 21  Pain Screening  Patient Currently in Pain: Yes  Pain Assessment  Pain Assessment: 0-10  Pain Level: 6  Patient's Stated Pain Goal: 2  Pain Type: Surgical pain  Pain Location: Hip  Pain Orientation: Left  Pain Radiating Towards: L lateral hip  Pain Descriptors: Aching  Pain Frequency: Continuous  Pain Onset: Progressive  Clinical Progression: Gradually improving  Functional Pain Assessment: Prevents or interferes with all active and some passive activities  Vital Signs  Patient Currently in Pain: Yes    Vision/Hearing  Vision  Vision: Within Functional Limits  Hearing  Hearing: Within functional limits    Orientation  Orientation  Overall Orientation Status: Within Normal Limits    Social/Functional History  Social/Functional History  Lives With: Family;  Spouse  Type of Home: House  Home Layout: One level  Home Access: Stairs to enter without rails  Entrance Stairs - Number of Steps: 2  Bathroom Equipment: Shower chair; Commode  Home Equipment: Rolling walker; [place] [employer] Help From: Family  ADL Assistance: Needs assistance  Homemaking Assistance: Needs assistance  Ambulation Assistance: Independent  Transfer Assistance: Independent  Active : No  Mode of Transportation: Car  Occupation: On disability; Part time employment  Type of occupation: GM  IADL Comments: No RTW date set yet    Objective     Observation/Palpation  Posture: Good  Observation: Limp with gait    PROM LLE (degrees)  L Hip Flexion 0-125: 75  L Hip Extension 0-10: -5  L Hip ABduction 0-45: 25  L Knee Flexion 0-145: 120  L Knee Extension 0: 0    Strength RLE  Strength RLE: WFL  Strength LLE  L Hip Flexion: 3-/5  L Hip Extension: 3-/5  L Hip ABduction: 2/5  L Hip ADduction: 3-/5  L Knee Flexion: 3+/5  L Knee Extension: 3+/5  L Ankle Dorsiflexion: 5/5     Additional Measures  Flexibility: + Fabers, 60% deficient  Special Tests: + L Trandelenburg     Bed mobility  Supine to Sit: Minimal assistance  Sit to Supine: Minimal assistance  Transfers  Sit to Stand: Modified independent  Stand to sit: Modified independent  Comment: Needs UE assist       Ambulation  Ambulation?: Yes  WB Status: WBAT L  Ambulation 1  Surface: level tile  Device: Rolling Walker  Assistance: Supervision  Gait Deviations: Slow Norma; Decreased step length; Decreased step height  Distance: 200 ft with fatigue  Balance  Standing - Static: Fair  Standing - Dynamic: Poor     Assessment   Conditions Requiring Skilled Therapeutic Intervention  Body structures, Functions, Activity limitations: Decreased functional mobility ; Decreased strength; Decreased ROM;  Decreased endurance  Treatment Diagnosis: S/P L AKASH 12/6/21  Prognosis: Good  Decision Making: Low Complexity  History: RA x 10 yrs  Activity Tolerance  Activity Tolerance: Patient Tolerated treatment well         Plan   Plan  Times per week: 3  Plan weeks: 6-8 wks  Current Treatment Recommendations: Strengthening, ROM, Home Exercise Program, Balance Training, Gait Training, Transfer Training    OutComes Score   LEFI 18/80 = 23%, 77% disability                        Goals  Short term goals  Time Frame for Short term goals: 1 week  Short term goal 1: Review & progress HEP  Long term goals  Time Frame for Long term goals : 6-8 weeks  Long term goal 1: L hip pain controlled 2/10 for regular ADL  Long term goal 2: Sit to stand from chair, first attempt, no UE assist  Long term goal 3: Gait with cane or no device for community distances  Long term goal 4: Resume driving  Long term goal 5: Prepare for RETW, part -time status       Therapy Time   Individual Concurrent Group Co-treatment   Time In 0800         Time Out 0850         Minutes 50                 Coy Travis, PT

## 2021-12-09 ENCOUNTER — HOSPITAL ENCOUNTER (OUTPATIENT)
Dept: PHYSICAL THERAPY | Age: 56
Setting detail: THERAPIES SERIES
Discharge: HOME | End: 2021-12-09
Payer: COMMERCIAL

## 2021-12-09 PROCEDURE — 97110 THERAPEUTIC EXERCISES: CPT

## 2021-12-09 NOTE — FLOWSHEET NOTE
Physical Therapy Daily Treatment Note    Date:  2021    Patient Name:  Tj Reyes    :  1965  MRN: [id number]  Restrictions/Precautions: AKASH precautions                                               RA     Medical/Treatment Diagnosis Information:   · Diagnosis: S/P L AKASH 21   · Treatment Diagnosis: S/P L AKASH   Insurance/Certification information:  PT Insurance Information: [address]  Physician Information:  Referring Practitioner: Dasha Redmond of care signed (Y/N):  Y  Visit# / total visits:   Pain level: 6-7/10  L hip      9-10/10 RA of right shoulder       Time In: 1:58   Time Out: 2:31    Progress Note: []  Yes  [x]  No  Next due by: Visit #12, or 22      Subjective:  Pt stated feeling more rough his date due to RA flare up in shoulder. Pt states has not been able to Pt states felt good yesterday and has been trying to do a lot of walking at home. Pt reports had some hip muscle pain  When donning NH[employer] hose this date. Objective: KAYE performed per flow sheet for increased mobility, strengthening and decrease in pain for improved ambulation and daily living activities. Verbal cueing for sequencing and technique. Increased and added several exercises this date.  Pt limited by shoulder pain this date    Observation:   Test measurements:      Exercises:   Exercise/Equipment Resistance/Repetitions Other comments     No resisted abd x 6 wks   Q/ HS sets 10x    Heel slide 15x    SAQ 15x    SLR 10x With assist   Counter ex 10x TR/HR, ext, HS, flex, march   Squat Matrix 5x  Narrow, normal   Step up 10x    2\"    NUSTEP  When ready   Sit to Stand  4\" booster   LAQ 10x    HS curls 10x red              Gait with cane  In several weeks        Hip adduction 10x ball   Glut sets 10x    [x] Provided verbal/tactile cueing for activities related to strengthening, flexibility, endurance, ROM. (22617)  [] Provided verbal/tactile cueing for activities related to improving balance, coordination, kinesthetic sense, posture, motor skill, proprioception. (48797)    Therapeutic Activities:     [] Therapeutic activities, direct (one-on-one) patient contact (use of dynamic activities to improve functional performance). (13858)    Gait:   [] Provided training and instruction to the patient for ambulation re-education. (11154)    Self-Care/ADL's  [] Self-care/home management training and compensatory training, meal preparation, safety procedures, and instructions in use of assistive technology devices/adaptive equipment, direct one-on-one contact. (09394)    Home Exercise Program:  Review post-op bed ex   [x] Reviewed/Progressed HEP activities related to strengthening, flexibility, endurance, ROM. (15729)  [] Reviewed/Progressed HEP activities related to improving balance, coordination, kinesthetic sense, posture, motor skill, proprioception.  (69653)    Manual Treatments:    [] Provided manual therapy to mobilize soft tissue/joints for the purpose of modulating pain, promoting relaxation,  increasing ROM, reducing/eliminating soft tissue swelling/inflammation/restriction, improving soft tissue extensibility. (77266)    Service Based Modalities:      Timed Code Treatment Minutes:   There ex 33'     Total Treatment Minutes:  35'     Treatment/Activity Tolerance:  [x] Patient tolerated treatment well [] Patient limited by fatique  [] Patient limited by pain  [] Patient limited by other medical complications  [] Other:     Prognosis: [x] Good [] Fair  [] Poor    Patient Requires Follow-up: [x] Yes  [] No      Goals:  Short term goals  Time Frame for Short term goals: 1 week  Short term goal 1: Review & progress HEP    Long term goals  Time Frame for Long term goals : 6-8 weeks  Long term goal 1: L hip pain controlled 2/10 for regular ADL   Long term goal 2: Sit to stand from chair, first attempt, no UE assist  (unable)  Long term goal 3: Gait with cane or no device for community distances  Long term goal 4: Resume driving  Long term goal 5: Prepare for RETW, part -time status          Plan:   [] Continue per plan of care [] Alter current plan (see comments)  [x] Plan of care initiated [] Hold pending MD visit [] Discharge  Plan for Next Session: Progress as able                                          Accommodate for RA issues     Electronically signed by:  Fatoumata Martin, PTA

## 2021-12-10 NOTE — PROGRESS NOTES
I have reviewed and agree to the content of the note written by the PTA.   Electronically signed by Amrit Shipman PT [id number]

## 2021-12-13 ENCOUNTER — HOSPITAL ENCOUNTER (OUTPATIENT)
Dept: PHYSICAL THERAPY | Age: 56
Setting detail: THERAPIES SERIES
Discharge: HOME | End: 2021-12-13
Payer: COMMERCIAL

## 2021-12-13 PROCEDURE — 97110 THERAPEUTIC EXERCISES: CPT

## 2021-12-13 NOTE — FLOWSHEET NOTE
Physical Therapy Daily Treatment Note    Date:  2021    Patient Name:  Singh Irvin    :  1965  MRN: [id number]  Restrictions/Precautions: AKASH precautions                                               RA     Medical/Treatment Diagnosis Information:   · Diagnosis: S/P L AKASH 21   · Treatment Diagnosis: S/P L AKASH 66  Insurance/Certification information:  PT Insurance Information: [address]  Physician Information:  Referring Practitioner: Pepito Eitzen of care signed (Y/N):  Y  Visit# / total visits: 3 /12  Pain level: 3/10  L hip             Time In: 2:00   Time Out: 2:31    Progress Note: []  Yes  [x]  No  Next due by: Visit #12, or 22      Subjective:  Patient reports feeling much better this date. Pt state hip felt okay after last session. Pt states has difficulty getting leg in and out of bed and has to get up with arms     Objective: KAYE performed per flow sheet for increased mobility, strengthening and decrease in pain for improved ambulation and daily living activities. Verbal cueing for sequencing and technique. Increased and added several exercises this date.  Pt demos some increased pain with NuStep    Observation:   Test measurements:   L knee ext: 4-/5    Exercises:   Exercise/Equipment Resistance/Repetitions Other comments     No resisted abd x 6 wks   Q/ HS sets 10x    Heel slide 15x    SAQ 15x    SLR 10x With assist   Counter ex 10x TR/HR, ext, HS, flex, march   Squat Matrix 10x  Narrow, normal   Step up 10x    2\"    NUSTEP 5' Level 2    Sit to Stand 5x 4\" booster   LAQ 10x    HS curls 10x red              Gait with cane  In several weeks        Hip adduction 10x ball   Glut sets 10x    [x] Provided verbal/tactile cueing for activities related to strengthening, flexibility, endurance, ROM. (96514)  [] Provided verbal/tactile cueing for activities related to improving balance, coordination, kinesthetic sense, posture, motor skill, proprioception. (51633)    Therapeutic Activities:     [] Therapeutic activities, direct (one-on-one) patient contact (use of dynamic activities to improve functional performance). (97937)    Gait:   [] Provided training and instruction to the patient for ambulation re-education. (15566)    Self-Care/ADL's  [] Self-care/home management training and compensatory training, meal preparation, safety procedures, and instructions in use of assistive technology devices/adaptive equipment, direct one-on-one contact. (98688)    Home Exercise Program:  Review post-op bed ex   [x] Reviewed/Progressed HEP activities related to strengthening, flexibility, endurance, ROM. (89494)  [] Reviewed/Progressed HEP activities related to improving balance, coordination, kinesthetic sense, posture, motor skill, proprioception.  (61956)    Manual Treatments:    [] Provided manual therapy to mobilize soft tissue/joints for the purpose of modulating pain, promoting relaxation,  increasing ROM, reducing/eliminating soft tissue swelling/inflammation/restriction, improving soft tissue extensibility. (50953)    Service Based Modalities:      Timed Code Treatment Minutes:   There ex 31'     Total Treatment Minutes:  32'     Treatment/Activity Tolerance:  [x] Patient tolerated treatment well [] Patient limited by fatique  [] Patient limited by pain  [] Patient limited by other medical complications  [] Other:     Prognosis: [x] Good [] Fair  [] Poor    Patient Requires Follow-up: [x] Yes  [] No      Goals:  Short term goals  Time Frame for Short term goals: 1 week  Short term goal 1: Review & progress HEP    Long term goals  Time Frame for Long term goals : 6-8 weeks  Long term goal 1: L hip pain controlled 2/10 for regular ADL   Long term goal 2: Sit to stand from chair, first attempt, no UE assist  (with booster)  Long term goal 3: Gait with cane or no device for community distances  Long term goal 4: Resume driving  Long term goal 5: Prepare for RETW, part -time status          Plan:   [x] Continue per plan of care [] Alter current plan (see comments)  [] Plan of care initiated [] Hold pending MD visit [] Discharge  Plan for Next Session: Progress as able                                          Accommodate for RA issues     Electronically signed by:  Carina Rothman, PTA

## 2021-12-14 NOTE — PROGRESS NOTES
I have reviewed and agree to the content of the note written by the PTA.   Electronically signed by Perla Mohs PT [id number]

## 2021-12-15 ENCOUNTER — HOSPITAL ENCOUNTER (OUTPATIENT)
Dept: PHYSICAL THERAPY | Age: 56
Setting detail: THERAPIES SERIES
Discharge: HOME | End: 2021-12-15
Payer: COMMERCIAL

## 2021-12-15 PROCEDURE — 97110 THERAPEUTIC EXERCISES: CPT

## 2021-12-15 NOTE — FLOWSHEET NOTE
Physical Therapy Daily Treatment Note    Date:  12/15/2021    Patient Name:  Tj Reyes    :  1965  MRN: [id number]  Restrictions/Precautions: AKASH precautions                                               RA     Medical/Treatment Diagnosis Information:   · Diagnosis: S/P L AKASH 21   · Treatment Diagnosis: S/P L AKASH   Insurance/Certification information:  PT Insurance Information: [address]  Physician Information:  Referring Practitioner: Claudette Finch of care signed (Y/N):  Y  Visit# / total visits:   Pain level: 3/10  L hip             Time In: 2:01   Time Out: 2:39    Progress Note: []  Yes  [x]  No  Next due by: Visit #12, or 22      Subjective:  Patient reports has been feeling pretty good. Pt states has been trying to walk a lot. Patient states even went to grocery store. Patient states felt tired after previous session. Objective: KAYE performed per flow sheet for increased mobility, strengthening and decrease in pain for improved ambulation and daily living activities. Verbal cueing for sequencing and technique. Increased and added several exercises this date.  Pt reports some muscle fatigue upon leaving     Observation:   Test measurements:   L knee flex: 4-/5    Exercises:   Exercise/Equipment Resistance/Repetitions Other comments     No resisted abd x 6 wks        Heel slide 15x    SAQ 15x    SLR 12x With assist   Counter ex 15x TR/HR, ext, HS, flex, march   Squat Matrix 10x  Narrow, normal, wide   Step up 10x    4\"    NUSTEP 6' Level 2    Sit to Stand 10x foam   LAQ 15x    HS curls 10x red   Lunges 10x fwd   Bridges 10x          manual ex L hip 5'    Gait with cane  In several weeks        Hip adduction 15x ball   Glut sets 15x    [x] Provided verbal/tactile cueing for activities related to strengthening, flexibility, endurance, ROM. (03846)  [] Provided verbal/tactile cueing for activities related to improving balance, coordination, kinesthetic sense, posture, motor skill, proprioception. (45525)    Therapeutic Activities:     [] Therapeutic activities, direct (one-on-one) patient contact (use of dynamic activities to improve functional performance). (80684)    Gait:   [] Provided training and instruction to the patient for ambulation re-education. (22138)    Self-Care/ADL's  [] Self-care/home management training and compensatory training, meal preparation, safety procedures, and instructions in use of assistive technology devices/adaptive equipment, direct one-on-one contact. (09959)    Home Exercise Program:  Review post-op bed ex   [x] Reviewed/Progressed HEP activities related to strengthening, flexibility, endurance, ROM. (43109)  [] Reviewed/Progressed HEP activities related to improving balance, coordination, kinesthetic sense, posture, motor skill, proprioception.  (62817)    Manual Treatments:    [] Provided manual therapy to mobilize soft tissue/joints for the purpose of modulating pain, promoting relaxation,  increasing ROM, reducing/eliminating soft tissue swelling/inflammation/restriction, improving soft tissue extensibility. (42516)    Service Based Modalities:      Timed Code Treatment Minutes:   There ex 38'     Total Treatment Minutes:  45'     Treatment/Activity Tolerance:  [x] Patient tolerated treatment well [] Patient limited by fatique  [] Patient limited by pain  [] Patient limited by other medical complications  [] Other:     Prognosis: [x] Good [] Fair  [] Poor    Patient Requires Follow-up: [x] Yes  [] No      Goals:  Short term goals  Time Frame for Short term goals: 1 week  Short term goal 1: Review & progress HEP    Long term goals  Time Frame for Long term goals : 6-8 weeks  Long term goal 1: L hip pain controlled 2/10 for regular ADL   Long term goal 2: Sit to stand from chair, first attempt, no UE assist  (with booster)  Long term goal 3: Gait with cane or no device for community distances  Long term goal 4: Resume driving  Long term goal 5: Prepare for RETW, part -time status          Plan:   [x] Continue per plan of care [] Alter current plan (see comments)  [] Plan of care initiated [] Hold pending MD visit [] Discharge  Plan for Next Session: Progress as able                                          Accommodate for RA issues     Electronically signed by:  Kimberlyn Gallegos PTA

## 2021-12-16 NOTE — PROGRESS NOTES
I have reviewed and agree to the content of the note written by the PTA.   Electronically signed by Agnes Found PT [id number]

## 2021-12-17 ENCOUNTER — HOSPITAL ENCOUNTER (OUTPATIENT)
Dept: PHYSICAL THERAPY | Age: 56
Setting detail: THERAPIES SERIES
Discharge: HOME | End: 2021-12-17
Payer: COMMERCIAL

## 2021-12-17 NOTE — PROGRESS NOTES
Physical Therapy  Outpatient Physical Therapy    [x] [place]  Phone: [phone]  Fax: [phone]      [] [place]  Phone: [phone]  Fax: [phone]    Physical Therapy  Cancellation/No-show Note  Patient Name:  Tj Reyes  :  1965   Date:  2021  Cancelled visits to date: 1  No-shows to date: 0    For today's appointment patient:  [x]  Cancelled  []  Rescheduled appointment  []  No-show     Reason given by patient:  [x]  Patient ill  []  Conflicting appointment  []  No transportation    []  Conflict with work  []  No reason given  []  Other:     Comments:      Electronically signed by: Fatoumata Martin PTA

## 2021-12-20 ENCOUNTER — HOSPITAL ENCOUNTER (OUTPATIENT)
Dept: PHYSICAL THERAPY | Age: 56
Setting detail: THERAPIES SERIES
Discharge: HOME | End: 2021-12-20
Payer: COMMERCIAL

## 2021-12-20 NOTE — PROGRESS NOTES
Physical Therapy  Outpatient Physical Therapy    [x] [place]  Phone: [phone]  Fax: [phone]      [] [place]  Phone: [phone]  Fax: [phone]    Physical Therapy  Cancellation/No-show Note  Patient Name:  Nelson Elizabeth  :  1965   Date:  2021  Cancelled visits to date: 2  No-shows to date: 0    For today's appointment patient:  [x]  Cancelled  []  Rescheduled appointment  []  No-show     Reason given by patient:  []  Patient ill  []  Conflicting appointment  []  No transportation    []  Conflict with work  []  No reason given  [x]  Other:   Going out of town   Comments:      Electronically signed by: Denton Sneed PTA

## 2021-12-22 ENCOUNTER — APPOINTMENT (OUTPATIENT)
Dept: PHYSICAL THERAPY | Age: 56
End: 2021-12-22
Payer: COMMERCIAL

## 2022-01-10 RX ORDER — ESOMEPRAZOLE MAGNESIUM 40 MG/1
CAPSULE, DELAYED RELEASE ORAL
Qty: 90 CAPSULE | Refills: 3 | Status: SHIPPED | OUTPATIENT
Start: 2022-01-10

## 2022-01-27 NOTE — DISCHARGE SUMMARY
[facility]    [x] [place]  Phone: [phone]  Fax: [phone]      [] [place]  Phone: [phone]  Fax: [phone]    Physical Therapy Discharge Note  Date: 2022        Patient Name:  Chris Pelayo    :  1965  MRN: [id number]  Restrictions/Precautions:      Medical/Treatment Diagnosis Information:  ·   Diagnosis: S/P L AKASH 21   · Treatment Diagnosis: S/P L AKASH 21  ·   ·    Insurance/Certification information:    BCJOSUÉ Lopez GM  Physician Information:    [address] of care signed (Y/N): y  Visit# / total visits:  4  Pain level: 3/10       Time Period for Report:   Cancels/No-shows to date: 2     Plan of Care/Treatment to date:  [x] Therapeutic Exercise    [] Modalities:  [x] Therapeutic Activity     [] Ultrasound  [] Electrical Stimulation  [x] Gait Training      [] Cervical Traction    [] Lumbar Traction  [x] Neuromuscular Re-education  [] Cold/hotpack [] Iontophoresis  [x] Instruction in HEP      Other:  [] Manual Therapy       []    [] Aquatic Therapy       []                              Subjective:     Patient reports has been feeling pretty good. Pt states has been trying to walk a lot. Objective:   Did not complete much rehab.   Not seen for over 6 weeks            Plan:    d/c       Goals:    Short term goals  Time Frame for Short term goals: 1 week  Short term goal 1: Review & progress HEP     Long term goals  Time Frame for Long term goals : 6-8 weeks  Long term goal 1: L hip pain controlled 2/10 for regular ADL   Long term goal 2: Sit to stand from chair, first attempt, no UE assist  (with booster)  Long term goal 3: Gait with cane or no device for community distances  Long term goal 4: Resume driving  Long term goal 5: Prepare for RETW, part -time status         Percentage of Goals Met: unknown            Discharge Prognosis: [] Excellent [x] Good [] Fair  [] Poor     Goal Status:  [] Achieved [] Partially Achieved  [] Not Achieved       Electronically signed by:  Lucas Gallegos, PT

## 2022-02-07 RX ORDER — PAROXETINE HYDROCHLORIDE 40 MG/1
TABLET, FILM COATED ORAL
Qty: 90 TABLET | Refills: 3 | Status: SHIPPED | OUTPATIENT
Start: 2022-02-07

## 2022-02-08 ENCOUNTER — OFFICE VISIT (OUTPATIENT)
Dept: ORTHOPEDIC SURGERY | Age: 57
End: 2022-02-08

## 2022-02-08 VITALS
SYSTOLIC BLOOD PRESSURE: 128 MMHG | HEIGHT: 66 IN | DIASTOLIC BLOOD PRESSURE: 74 MMHG | BODY MASS INDEX: 25.07 KG/M2 | WEIGHT: 156 LBS | HEART RATE: 100 BPM

## 2022-02-08 DIAGNOSIS — M16.12 PRIMARY OSTEOARTHRITIS OF LEFT HIP: Primary | ICD-10-CM

## 2022-02-08 PROCEDURE — 99214 OFFICE O/P EST MOD 30 MIN: CPT

## 2022-02-08 PROCEDURE — 99024 POSTOP FOLLOW-UP VISIT: CPT | Performed by: PHYSICIAN ASSISTANT

## 2022-02-08 NOTE — PROGRESS NOTES
Orthopaedic Progress Note      CHIEF COMPLAINT: Status post left total hip arthroplasty    HISTORY OF PRESENT ILLNESS:       Ms. Elizabeth Lehman  is a 49 y.o. female  who presents with total hip arthroplasty date of procedure was 12/6/2021. Patient doing a lot better at this time. Patient underwent total hip arthroplasty secondary to avascular necrosis of his left hip. Implants were [employer]. Patient is doing really well at this time she has no abnormality noted with gait pattern at this time. She has been working with therapy she is not using any type of assistive device      Past Medical History:    Past Medical History:   Diagnosis Date    Fibroid uterus     removed with laparoscopy     GERD (gastroesophageal reflux disease)     Hyperlipidemia     Rheumatoid arthritis ([facility] [address]     Tobacco abuse        Past Surgical History:    Past Surgical History:   Procedure Laterality Date    CHOLECYSTECTOMY      COLONOSCOPY  04/27/2018    [id number]    LAPAROSCOPY      exploratory\         Current  Medications:  Current Outpatient Medications   Medication Sig Dispense Refill    PARoxetine (PAXIL) 40 MG tablet TAKE 1 TABLET DAILY 90 tablet 3    esomeprazole (NEXIUM) 40 MG delayed release capsule TAKE 1 CAPSULE EVERY       MORNING BEFORE BREAKFAST 90 capsule 3    cyclobenzaprine (FLEXERIL) 10 MG tablet Take 10 mg by mouth 3 times daily as needed for Muscle spasms      potassium chloride (KLOR-CON M20) 20 MEQ extended release tablet TAKE 1 TABLET DAILY 90 tablet 1    atorvastatin (LIPITOR) 20 MG tablet 1 po daily 90 tablet 3    predniSONE (DELTASONE) 5 MG tablet  (Patient not taking: Reported on 11/30/2021)      PARoxetine (PAXIL) 10 MG tablet TAKE 1 TABLET EVERY MORNINGWITH THE 40 MG DOSE.  90 tablet 3    Multiple Vitamins-Minerals (MULTIPLE VITAMINS/WOMENS) TABS Take 1 tablet by mouth daily      Tofacitinib Citrate (XELJANZ PO) Take by mouth (Patient not taking: Reported on 11/30/2021)      albuterol sulfate HFA (VENTOLIN HFA) 108 (90 Base) MCG/ACT inhaler Inhale 2 puffs into the lungs every 4 hours as needed for Wheezing or Shortness of Breath 1 Inhaler 0    furosemide (LASIX) 20 MG tablet TAKE 1 TABLET DAILY AS     NEEDED FOR EDEMA 90 tablet 0    naproxen (NAPROSYN) 500 MG tablet       methotrexate (RHEUMATREX) 2.5 MG chemo tablet Take by mouth once a week (Patient not taking: Reported on )      folic acid (FOLVITE) 1 MG tablet Take 1 mg by mouth daily      vitamin D (CHOLECALCIFEROL) 1000 UNIT TABS tablet Take 1,000 Units by mouth daily       No current facility-administered medications for this visit. Allergies:  Patient has no known allergies. Social History:   Social History     Tobacco Use   Smoking Status Current Every Day Smoker    Packs/day: 1.00    Years: 30.00    Pack years: 30.00    Types: Cigarettes   Smokeless Tobacco Never Used     Social History     Substance and Sexual Activity   Alcohol Use Yes    Alcohol/week: 0.0 standard drinks    Comment: social     Social History     Substance and Sexual Activity   Drug Use No       Family History:  Family History   Problem Relation Age of Onset    Breast Cancer Mother         breast , tumor in bowels.  age 80    Heart Disease Father        REVIEW OF SYSTEMS:  Constitutional: Denies any fever, chills. Musculoskeletal: Positive for left total hip. PHYSICAL EXAM:  [unfilled]  General appearance:  Alert and oriented x 3. No apparent distress, appears stated age, calm and cooperative. Musculoskeletal: No pain internal/external Tatian skin incisions well proximally healed no rash or lesions noted.       DATA:  CBC:   Lab Results   Component Value Date    WBC 6.1 2021    HGB 13.5 2021     2021     BMP:    Lab Results   Component Value Date     2021    K 3.9 2021     2021    CO2 26 2021    BUN 10 2021    CREATININE 0.63 2021    CALCIUM 10.3 11/12/2021    GLUCOSE 119 11/12/2021     PT/INR:    Lab Results   Component Value Date    PROTIME 9.7 07/19/2016    INR 0.9 07/19/2016     Troponin:  No results found for: TROPONINI  No results for input(s): LIPASE, AMYLASE in the last 72 hours. No results for input(s): AST, ALT, BILIDIR, BILITOT, ALKPHOS in the last 72 hours. Uric Acid:  No components found for: URIC  Urine Culture:  No components found for: CURINE    Radiology:   No results found. Diagnosis Orders   1. Primary osteoarthritis of left hip           PLAN:  Left hip pain has improved significantly continue with therapy continue with activities as tolerated by the patient follow-up with us on as-needed basis    No orders of the defined types were placed in this encounter.        Procedure:        Electronically signed by Claudia Mccall PA-C on 2/8/2022 at 9:28 AM

## 2022-03-08 RX ORDER — PAROXETINE 10 MG/1
TABLET, FILM COATED ORAL
Qty: 90 TABLET | Refills: 0 | Status: SHIPPED | OUTPATIENT
Start: 2022-03-08 | End: 2022-05-24

## 2022-03-08 NOTE — TELEPHONE ENCOUNTER
[facility] called requesting a refill of the below medication which has been pended for you:     Requested Prescriptions     Pending Prescriptions Disp Refills    PARoxetine (PAXIL) 10 MG tablet [Pharmacy Med Name: PAROXETINE TAB 10MG HCL] 90 tablet 0     Sig: TAKE 1 TABLET EVERY [address] 40 MG DOSE       Last Appointment Date: 11/11/2021  Next Appointment Date: Visit date not found    No Known Allergies

## 2022-04-03 ENCOUNTER — OFFICE VISIT (OUTPATIENT)
Dept: PRIMARY CARE CLINIC | Age: 57
End: 2022-04-03
Payer: COMMERCIAL

## 2022-04-03 VITALS
DIASTOLIC BLOOD PRESSURE: 90 MMHG | OXYGEN SATURATION: 96 % | SYSTOLIC BLOOD PRESSURE: 158 MMHG | HEIGHT: 62 IN | WEIGHT: 154 LBS | TEMPERATURE: 96.4 F | BODY MASS INDEX: 28.34 KG/M2 | HEART RATE: 104 BPM

## 2022-04-03 DIAGNOSIS — B02.9 HERPES ZOSTER WITHOUT COMPLICATION: Primary | ICD-10-CM

## 2022-04-03 PROCEDURE — 99213 OFFICE O/P EST LOW 20 MIN: CPT | Performed by: FAMILY MEDICINE

## 2022-04-03 RX ORDER — VALACYCLOVIR HYDROCHLORIDE 1 G/1
1000 TABLET, FILM COATED ORAL 3 TIMES DAILY
Qty: 21 TABLET | Refills: 0 | Status: SHIPPED | OUTPATIENT
Start: 2022-04-03 | End: 2022-08-11

## 2022-04-03 ASSESSMENT — ENCOUNTER SYMPTOMS
RESPIRATORY NEGATIVE: 1
GASTROINTESTINAL NEGATIVE: 1
EYES NEGATIVE: 1
ALLERGIC/IMMUNOLOGIC NEGATIVE: 1

## 2022-04-03 ASSESSMENT — PATIENT HEALTH QUESTIONNAIRE - PHQ9
SUM OF ALL RESPONSES TO PHQ QUESTIONS 1-9: 0
SUM OF ALL RESPONSES TO PHQ QUESTIONS 1-9: 0
SUM OF ALL RESPONSES TO PHQ9 QUESTIONS 1 & 2: 0
1. LITTLE INTEREST OR PLEASURE IN DOING THINGS: 0
SUM OF ALL RESPONSES TO PHQ QUESTIONS 1-9: 0
2. FEELING DOWN, DEPRESSED OR HOPELESS: 0
SUM OF ALL RESPONSES TO PHQ QUESTIONS 1-9: 0

## 2022-04-03 NOTE — PROGRESS NOTES
(XELJANZ PO) Take by mouth (Patient not taking: Reported on 11/30/2021)      methotrexate (RHEUMATREX) 2.5 MG chemo tablet Take by mouth once a week (Patient not taking: Reported on 11/30/2021)       No current facility-administered medications for this visit. No Known Allergies      Review of Systems   Constitutional: Positive for fatigue. HENT: Negative. Eyes: Negative. Respiratory: Negative. Cardiovascular: Negative. Gastrointestinal: Negative. Endocrine: Negative. Genitourinary: Positive for flank pain (left). Musculoskeletal: Positive for arthralgias. Skin: Positive for rash. Allergic/Immunologic: Negative. Neurological: Negative. Hematological: Negative. Psychiatric/Behavioral: Negative. Objective:   Physical Exam  Constitutional:       Appearance: She is well-developed. HENT:      Head: Normocephalic and atraumatic. Right Ear: External ear normal.      Left Ear: External ear normal.      Mouth/Throat:      Pharynx: No oropharyngeal exudate. Eyes:      General: No scleral icterus. Conjunctiva/sclera: Conjunctivae normal.   Neck:      Thyroid: No thyromegaly. Cardiovascular:      Rate and Rhythm: Normal rate and regular rhythm. Heart sounds: Normal heart sounds. No murmur heard. Pulmonary:      Effort: Pulmonary effort is normal. No respiratory distress. Breath sounds: Normal breath sounds. No wheezing. Abdominal:      General: Bowel sounds are normal. There is no distension. Palpations: Abdomen is soft. Tenderness: There is no abdominal tenderness. There is no rebound. Musculoskeletal:         General: No tenderness. Normal range of motion. Cervical back: Neck supple. Skin:     General: Skin is warm and dry. Findings: Rash (larger macule towards the midline with raised areas, not vesicular yet. other smaller spots around left chest wall) present. No erythema.           Neurological:      Mental Status: She is alert and oriented to person, place, and time. Psychiatric:         Behavior: Behavior normal.         Thought Content: Thought content normal.         Judgment: Judgment normal.       BP (!) 158/90 (Site: Right Upper Arm, Position: Sitting, Cuff Size: Medium Adult)   Pulse 104   Temp 96.4 °F (35.8 °C) (Temporal)   Ht 5' 2\" (1.575 m)   Wt 154 lb (69.9 kg)   LMP 01/18/2018 (Within Months)   SpO2 96%   BMI 28.17 kg/m²     Assessment:      Shingles: suspect left flank pain due to same. Plan:      Valtrex x 7 days  Discussed typical course, supportive care, and complications  Call with complications or concerns.           Nika Pollock MD

## 2022-04-04 ENCOUNTER — TELEPHONE (OUTPATIENT)
Dept: FAMILY MEDICINE CLINIC | Age: 57
End: 2022-04-04

## 2022-04-04 ENCOUNTER — TELEPHONE (OUTPATIENT)
Dept: OBGYN | Age: 57
End: 2022-04-04

## 2022-04-04 NOTE — TELEPHONE ENCOUNTER
----- Message from Rey Howardsushila sent at 4/1/2022  4:12 PM EDT -----  Subject: Appointment Request    Reason for Call: Semi-Routine Skin Problem    QUESTIONS  Type of Appointment? Established Patient  Reason for appointment request? No appointments available during search  Additional Information for Provider? pt is requesting an appt for lump arm   (left)   ---------------------------------------------------------------------------  --------------  CALL BACK INFO  What is the best way for the office to contact you? OK to leave message on   voicemail  Preferred Call Back Phone Number? [phone]  ---------------------------------------------------------------------------  --------------  SCRIPT ANSWERS  Relationship to Patient? Self  Are you having swelling in your throat or face? No  Are you having difficulty breathing? No  Have the symptoms worsened or spread in the last day? No  Are you having fevers (100.4), chills or sweats? No  Have you recently (14 days) seen a provider for this issue? No  Have you been diagnosed with, awaiting test results for, or told that you   are suspected of having COVID-19 (Coronavirus)? (If patient has tested   negative or was tested as a requirement for work, school, or travel and   not based on symptoms, answer no)? No  Within the past 10 days have you developed any of the following symptoms   (answer no if symptoms have been present longer than 10 days or began   more than 10 days ago)? Fever or Chills, Cough, Shortness of breath or   difficulty breathing, Loss of taste or smell, Sore throat, Nasal   congestion, Sneezing or runny nose, Fatigue or generalized body aches   (answer no if pain is specific to a body part e.g. back pain), Diarrhea,   Headache? No  Have you had close contact with someone with COVID-19 in the last 7 days? No  (Service Expert  click yes below to proceed with [employer] As Usual   Scheduling)?  Yes
Phoned patient-patient seen in  on 04/03/22
contact with someone with COVID-19 in the last 7 days? No  (Service Expert  click yes below to proceed with [employer] As Usual   Scheduling)?  Yes

## 2022-04-05 ENCOUNTER — TELEPHONE (OUTPATIENT)
Dept: FAMILY MEDICINE CLINIC | Age: 57
End: 2022-04-05

## 2022-04-05 NOTE — TELEPHONE ENCOUNTER
Pt calling stating she talked to [facility] and is on Valtrex for her shingles but pt states she doesn't have time for this, states she needs a steroid or something for these shingles, pt uses pended pharmacy, please advise at above number.

## 2022-04-05 NOTE — TELEPHONE ENCOUNTER
Discussed with patient-agreeable to try the Gabapentin also has an upcoming dental appointment-dentist requesting premed prior to appointment.   Laila miller

## 2022-04-06 RX ORDER — GABAPENTIN 100 MG/1
100 CAPSULE ORAL 3 TIMES DAILY
Qty: 90 CAPSULE | Refills: 0 | Status: SHIPPED | OUTPATIENT
Start: 2022-04-06 | End: 2022-08-11

## 2022-05-24 RX ORDER — PAROXETINE 10 MG/1
TABLET, FILM COATED ORAL
Qty: 90 TABLET | Refills: 0 | Status: SHIPPED | OUTPATIENT
Start: 2022-05-24 | End: 2022-08-17

## 2022-05-26 ENCOUNTER — PATIENT MESSAGE (OUTPATIENT)
Dept: PRIMARY CARE CLINIC | Age: 57
End: 2022-05-26

## 2022-05-26 ENCOUNTER — OFFICE VISIT (OUTPATIENT)
Dept: PRIMARY CARE CLINIC | Age: 57
End: 2022-05-26
Payer: COMMERCIAL

## 2022-05-26 ENCOUNTER — HOSPITAL ENCOUNTER (OUTPATIENT)
Dept: GENERAL RADIOLOGY | Age: 57
Discharge: HOME | End: 2022-05-28
Payer: COMMERCIAL

## 2022-05-26 VITALS
OXYGEN SATURATION: 96 % | DIASTOLIC BLOOD PRESSURE: 70 MMHG | TEMPERATURE: 97.4 F | HEART RATE: 67 BPM | HEIGHT: 64 IN | SYSTOLIC BLOOD PRESSURE: 120 MMHG | BODY MASS INDEX: 25.61 KG/M2 | WEIGHT: 150 LBS

## 2022-05-26 DIAGNOSIS — M17.12 PRIMARY OSTEOARTHRITIS OF LEFT KNEE: ICD-10-CM

## 2022-05-26 DIAGNOSIS — M25.552 ACUTE PAIN OF LEFT HIP: ICD-10-CM

## 2022-05-26 DIAGNOSIS — M16.12 PRIMARY OSTEOARTHRITIS OF LEFT HIP: ICD-10-CM

## 2022-05-26 DIAGNOSIS — M54.50 ACUTE LEFT-SIDED LOW BACK PAIN WITHOUT SCIATICA: ICD-10-CM

## 2022-05-26 DIAGNOSIS — M25.552 ACUTE PAIN OF LEFT HIP: Primary | ICD-10-CM

## 2022-05-26 PROCEDURE — 73502 X-RAY EXAM HIP UNI 2-3 VIEWS: CPT

## 2022-05-26 PROCEDURE — 99214 OFFICE O/P EST MOD 30 MIN: CPT | Performed by: FAMILY MEDICINE

## 2022-05-26 RX ORDER — NAPROXEN 500 MG/1
500 TABLET ORAL 2 TIMES DAILY WITH MEALS
Qty: 60 TABLET | Refills: 2 | Status: SHIPPED | OUTPATIENT
Start: 2022-05-26

## 2022-05-26 RX ORDER — TRAMADOL HYDROCHLORIDE 50 MG/1
50 TABLET ORAL EVERY 6 HOURS PRN
Qty: 120 TABLET | Refills: 0 | Status: SHIPPED | OUTPATIENT
Start: 2022-05-26 | End: 2022-06-25

## 2022-05-26 ASSESSMENT — PATIENT HEALTH QUESTIONNAIRE - PHQ9
SUM OF ALL RESPONSES TO PHQ QUESTIONS 1-9: 0
SUM OF ALL RESPONSES TO PHQ QUESTIONS 1-9: 0
1. LITTLE INTEREST OR PLEASURE IN DOING THINGS: 0
SUM OF ALL RESPONSES TO PHQ QUESTIONS 1-9: 0
SUM OF ALL RESPONSES TO PHQ QUESTIONS 1-9: 0

## 2022-05-26 ASSESSMENT — ENCOUNTER SYMPTOMS
ALLERGIC/IMMUNOLOGIC NEGATIVE: 1
RESPIRATORY NEGATIVE: 1
GASTROINTESTINAL NEGATIVE: 1
EYES NEGATIVE: 1

## 2022-05-26 NOTE — LETTER
[address] [facility] A department of [place] [address]  Phone: [phone]  Fax: [phone]        Miah Orellana MD      May 26, 2022    Patient:   Meri Little  Date of Birth   1965  Date of visit   5/26/2022        To Whom it May Concern:      Gabriel Oliveira was seen in my clinic on 5/26/2022. Please excuse from work today thru Monday, 5/30/2022. If you have any questions or concerns, please don't hesitate to call.       Sincerely,      Miah Orellana MD/bn

## 2022-05-26 NOTE — PROGRESS NOTES
Subjective:      Patient ID: Chau Pizarro is a 56 y.o. female. HPI   Acute urgent care for left hip pain after a fall on her deck Saturday(5 days). Pain worse the next am.  Persistent pain and difficulty wt. Bearing . Worse again this am.  Using topicals. She feels she landed on left hip area. S/p Left AKASH dec. 2021. No bruising found     Past Medical History:   Diagnosis Date    Fibroid uterus     removed with laparoscopy     GERD (gastroesophageal reflux disease)     Hyperlipidemia     Rheumatoid arthritis ([address]     Tobacco abuse      Past Surgical History:   Procedure Laterality Date    CHOLECYSTECTOMY      COLONOSCOPY  04/27/2018    [id number]    LAPAROSCOPY      exploratory\     Current Outpatient Medications   Medication Sig Dispense Refill    PARoxetine (PAXIL) 10 MG tablet TAKE 1 TABLET EVERY MORNINGWITH THE 40 MG DOSE 90 tablet 0    PARoxetine (PAXIL) 40 MG tablet TAKE 1 TABLET DAILY 90 tablet 3    esomeprazole (NEXIUM) 40 MG delayed release capsule TAKE 1 CAPSULE EVERY       MORNING BEFORE BREAKFAST 90 capsule 3    cyclobenzaprine (FLEXERIL) 10 MG tablet Take 10 mg by mouth 3 times daily as needed for Muscle spasms      atorvastatin (LIPITOR) 20 MG tablet 1 po daily 90 tablet 3    Multiple Vitamins-Minerals (MULTIPLE VITAMINS/WOMENS) TABS Take 1 tablet by mouth daily      albuterol sulfate HFA (VENTOLIN HFA) 108 (90 Base) MCG/ACT inhaler Inhale 2 puffs into the lungs every 4 hours as needed for Wheezing or Shortness of Breath 1 Inhaler 0    furosemide (LASIX) 20 MG tablet TAKE 1 TABLET DAILY AS     NEEDED FOR EDEMA 90 tablet 0    naproxen (NAPROSYN) 500 MG tablet       folic acid (FOLVITE) 1 MG tablet Take 1 mg by mouth daily      vitamin D (CHOLECALCIFEROL) 1000 UNIT TABS tablet Take 1,000 Units by mouth daily      gabapentin (NEURONTIN) 100 MG capsule Take 1 capsule by mouth 3 times daily for 30 days.  Intended supply: 30 days 90 capsule 0    valACYclovir (VALTREX) 1 tenderness. Normal range of motion. Cervical back: Neck supple. Skin:     General: Skin is warm and dry. Findings: No erythema or rash. Neurological:      Mental Status: She is alert and oriented to person, place, and time. Psychiatric:         Behavior: Behavior normal.         Thought Content: Thought content normal.         Judgment: Judgment normal.        /70 (Site: Right Upper Arm, Position: Sitting, Cuff Size: Medium Adult)   Pulse 67   Temp 97.4 °F (36.3 °C) (Tympanic)   Ht 5' 4\" (1.626 m)   Wt 150 lb (68 kg)   LMP 01/18/2018 (Within Months)   SpO2 96%   BMI 25.75 kg/m²     Assessment:      Encounter Diagnoses   Name Primary?  Acute pain of left hip Yes    Acute left-sided low back pain without sciatica      Suspect lower left back or SI source for pain at present. Xray shows no fracture or dislocation. Left hip hardware intact. Plan:      Cont. Naproxen bid , adding back ultram for more moderate to severe pain. Side effects and precautions reviewed.     Recheck livan Pearce MD

## 2022-05-26 NOTE — TELEPHONE ENCOUNTER
From: Meri Little  To: Dr. Aydee Schwartz: 5/26/2022 12:04 PM EDT  Subject: My left hip    I fell on my hip that was replaced thought it was just sore not getting better can u get me in and do u have anything to call in for pain I can walk but really hurts

## 2022-06-21 ENCOUNTER — PATIENT MESSAGE (OUTPATIENT)
Dept: PRIMARY CARE CLINIC | Age: 57
End: 2022-06-21

## 2022-06-21 NOTE — TELEPHONE ENCOUNTER
From: Carl Wu  To: Dr. Alix Welch: 6/21/2022 1:08 PM EDT  Subject: MRI     I need a photo copy of my MRI for this Friday if I could  a copy of MRI on Thursday that would b great

## 2022-08-11 ENCOUNTER — PATIENT MESSAGE (OUTPATIENT)
Dept: PRIMARY CARE CLINIC | Age: 57
End: 2022-08-11

## 2022-08-11 ENCOUNTER — OFFICE VISIT (OUTPATIENT)
Dept: FAMILY MEDICINE CLINIC | Age: 57
End: 2022-08-11
Payer: COMMERCIAL

## 2022-08-11 VITALS
HEIGHT: 65 IN | WEIGHT: 149 LBS | DIASTOLIC BLOOD PRESSURE: 72 MMHG | HEART RATE: 68 BPM | SYSTOLIC BLOOD PRESSURE: 124 MMHG | BODY MASS INDEX: 24.83 KG/M2

## 2022-08-11 DIAGNOSIS — M54.31 SCIATICA, RIGHT SIDE: Primary | ICD-10-CM

## 2022-08-11 PROCEDURE — 99213 OFFICE O/P EST LOW 20 MIN: CPT | Performed by: FAMILY MEDICINE

## 2022-08-11 RX ORDER — POTASSIUM CHLORIDE 20 MEQ/1
TABLET, EXTENDED RELEASE ORAL
Qty: 90 TABLET | Refills: 1 | Status: SHIPPED | OUTPATIENT
Start: 2022-08-11 | End: 2022-10-20

## 2022-08-11 RX ORDER — PREDNISONE 20 MG/1
40 TABLET ORAL DAILY
Qty: 10 TABLET | Refills: 0 | Status: SHIPPED | OUTPATIENT
Start: 2022-08-11 | End: 2022-08-16

## 2022-08-11 RX ORDER — TRAMADOL HYDROCHLORIDE 50 MG/1
50 TABLET ORAL EVERY 6 HOURS PRN
Qty: 28 TABLET | Refills: 0 | Status: SHIPPED | OUTPATIENT
Start: 2022-08-11 | End: 2022-08-18

## 2022-08-11 ASSESSMENT — ENCOUNTER SYMPTOMS
EYES NEGATIVE: 1
RESPIRATORY NEGATIVE: 1
GASTROINTESTINAL NEGATIVE: 1
BACK PAIN: 1

## 2022-08-11 ASSESSMENT — PATIENT HEALTH QUESTIONNAIRE - PHQ9
SUM OF ALL RESPONSES TO PHQ QUESTIONS 1-9: 0
SUM OF ALL RESPONSES TO PHQ9 QUESTIONS 1 & 2: 0
SUM OF ALL RESPONSES TO PHQ QUESTIONS 1-9: 0
2. FEELING DOWN, DEPRESSED OR HOPELESS: 0
1. LITTLE INTEREST OR PLEASURE IN DOING THINGS: 0
SUM OF ALL RESPONSES TO PHQ QUESTIONS 1-9: 0
SUM OF ALL RESPONSES TO PHQ QUESTIONS 1-9: 0

## 2022-08-11 NOTE — TELEPHONE ENCOUNTER
From: Amber Inman  To: Dr. Krzysztof Canas: 8/11/2022 7:51 AM EDT  Subject: Pain medication     My sciatic nerve is hurting wondering if I can get anything for this

## 2022-08-11 NOTE — PROGRESS NOTES
Subjective:      Patient ID: Augustus Nissen is a 56 y.o. female. HPI  acute visit for back pain, right lower lumbar radiating down to lateral right leg in the last 3 days and getting worse. Hard to walk after work yesterday. No recalled injury or initiating event. Naproxen not handling the pain. Seeing rheumatologist.  He gives injections but does not want to give out pain medications.       Past Medical History:   Diagnosis Date    Fibroid uterus     removed with laparoscopy     GERD (gastroesophageal reflux disease)     Hyperlipidemia     Rheumatoid arthritis ([facility] [address]     Tobacco abuse      Past Surgical History:   Procedure Laterality Date    CHOLECYSTECTOMY      COLONOSCOPY  04/27/2018    [id number]    LAPAROSCOPY      exploratory\     Current Outpatient Medications   Medication Sig Dispense Refill    potassium chloride (KLOR-CON M20) 20 MEQ extended release tablet TAKE 1 TABLET DAILY 90 tablet 1    naproxen (NAPROSYN) 500 MG tablet Take 1 tablet by mouth 2 times daily (with meals) 60 tablet 2    PARoxetine (PAXIL) 10 MG tablet TAKE 1 TABLET EVERY MORNINGWITH THE 40 MG DOSE 90 tablet 0    PARoxetine (PAXIL) 40 MG tablet TAKE 1 TABLET DAILY 90 tablet 3    esomeprazole (NEXIUM) 40 MG delayed release capsule TAKE 1 CAPSULE EVERY       MORNING BEFORE BREAKFAST 90 capsule 3    cyclobenzaprine (FLEXERIL) 10 MG tablet Take 10 mg by mouth 3 times daily as needed for Muscle spasms      atorvastatin (LIPITOR) 20 MG tablet 1 po daily 90 tablet 3    Multiple Vitamins-Minerals (MULTIPLE VITAMINS/WOMENS) TABS Take 1 tablet by mouth daily      Tofacitinib Citrate (XELJANZ PO) Take by mouth      vitamin D (CHOLECALCIFEROL) 1000 UNIT TABS tablet Take 1,000 Units by mouth daily      albuterol sulfate HFA (VENTOLIN HFA) 108 (90 Base) MCG/ACT inhaler Inhale 2 puffs into the lungs every 4 hours as needed for Wheezing or Shortness of Breath 1 Inhaler 0    furosemide (LASIX) 20 MG tablet TAKE 1 TABLET DAILY AS     NEEDED FOR EDEMA 90 tablet 0     No current facility-administered medications for this visit. No Known Allergies      Review of Systems   Constitutional: Negative. HENT: Negative. Eyes: Negative. Respiratory: Negative. Cardiovascular: Negative. Gastrointestinal: Negative. Genitourinary: Negative. Musculoskeletal:  Positive for arthralgias, back pain and myalgias. Skin: Negative. Neurological:  Positive for numbness (radicular pain to right lower leg). Psychiatric/Behavioral: Negative. Objective:   Physical Exam  Constitutional:       General: She is not in acute distress. Appearance: She is well-developed. HENT:      Head: Normocephalic and atraumatic. Right Ear: External ear normal.      Left Ear: External ear normal.      Mouth/Throat:      Pharynx: No oropharyngeal exudate. Eyes:      General: No scleral icterus. Conjunctiva/sclera: Conjunctivae normal.   Neck:      Thyroid: No thyromegaly. Cardiovascular:      Rate and Rhythm: Normal rate and regular rhythm. Heart sounds: Normal heart sounds. No murmur heard. Pulmonary:      Effort: Pulmonary effort is normal. No respiratory distress. Breath sounds: Normal breath sounds. No wheezing. Abdominal:      General: Bowel sounds are normal. There is no distension. Palpations: Abdomen is soft. Tenderness: There is no abdominal tenderness. There is no rebound. Musculoskeletal:      Cervical back: Neck supple. Lumbar back: Tenderness present. Decreased range of motion. Negative right straight leg raise test (pain in the popliteal with stretching, non radiating pain). Back:    Skin:     General: Skin is warm and dry. Findings: No erythema or rash. Neurological:      Mental Status: She is alert and oriented to person, place, and time. Psychiatric:         Behavior: Behavior normal.         Thought Content:  Thought content normal.         Judgment: Judgment normal.     /72 (Site: Right Upper Arm, Position: Sitting, Cuff Size: Small Adult)   Pulse 68   Ht 5' 5\" (1.651 m)   Wt 149 lb (67.6 kg)   LMP 01/18/2018 (Within Months)   BMI 24.79 kg/m²     Assessment:      Encounter Diagnosis   Name Primary? Sciatica, right side Yes           Plan:      Plan prednisone 40mg/day x 5 days  Then return to naproxen bid  Cont.  Flexeril 10mg tid prn spasm  Resuming tramadol  Q6 hrs prn pain  Follow up prn         Dea Goldman MD

## 2022-08-17 RX ORDER — PAROXETINE 10 MG/1
TABLET, FILM COATED ORAL
Qty: 90 TABLET | Refills: 0 | Status: SHIPPED | OUTPATIENT
Start: 2022-08-17

## 2022-08-18 DIAGNOSIS — M54.31 SCIATICA, RIGHT SIDE: Primary | ICD-10-CM

## 2022-09-15 ENCOUNTER — OFFICE VISIT (OUTPATIENT)
Dept: PAIN MANAGEMENT | Age: 57
End: 2022-09-15
Payer: COMMERCIAL

## 2022-09-15 ENCOUNTER — TELEPHONE (OUTPATIENT)
Dept: PAIN MANAGEMENT | Age: 57
End: 2022-09-15

## 2022-09-15 VITALS
HEIGHT: 65 IN | SYSTOLIC BLOOD PRESSURE: 138 MMHG | HEART RATE: 81 BPM | RESPIRATION RATE: 18 BRPM | WEIGHT: 152.25 LBS | DIASTOLIC BLOOD PRESSURE: 80 MMHG | OXYGEN SATURATION: 99 % | BODY MASS INDEX: 25.37 KG/M2

## 2022-09-15 DIAGNOSIS — M53.3 PAIN OF RIGHT SACROILIAC JOINT: ICD-10-CM

## 2022-09-15 DIAGNOSIS — M06.9 RHEUMATOID ARTHRITIS INVOLVING RIGHT HIP, UNSPECIFIED WHETHER RHEUMATOID FACTOR PRESENT (HCC): Primary | ICD-10-CM

## 2022-09-15 DIAGNOSIS — M54.41 CHRONIC RIGHT-SIDED LOW BACK PAIN WITH RIGHT-SIDED SCIATICA: ICD-10-CM

## 2022-09-15 DIAGNOSIS — G57.01 PIRIFORMIS SYNDROME OF RIGHT SIDE: ICD-10-CM

## 2022-09-15 DIAGNOSIS — G89.29 CHRONIC RIGHT-SIDED LOW BACK PAIN WITH RIGHT-SIDED SCIATICA: ICD-10-CM

## 2022-09-15 PROCEDURE — 99204 OFFICE O/P NEW MOD 45 MIN: CPT | Performed by: NURSE PRACTITIONER

## 2022-09-15 RX ORDER — PREDNISONE 1 MG/1
TABLET ORAL
COMMUNITY
Start: 2022-08-23

## 2022-09-15 RX ORDER — ETANERCEPT 50 MG/ML
SOLUTION SUBCUTANEOUS
COMMUNITY
Start: 2022-09-13

## 2022-09-15 RX ORDER — SULFASALAZINE 500 MG/1
TABLET ORAL
COMMUNITY
Start: 2022-09-09

## 2022-09-15 RX ORDER — HYDROCODONE BITARTRATE AND ACETAMINOPHEN 5; 325 MG/1; MG/1
1 TABLET ORAL 2 TIMES DAILY PRN
Qty: 28 TABLET | Refills: 0 | Status: SHIPPED | OUTPATIENT
Start: 2022-09-15 | End: 2022-09-20

## 2022-09-15 NOTE — PATIENT INSTRUCTIONS
[facility]  [address]  Telephone [phone]  Fax [phone]      PROCEDURE INSTRUCTIONS FOR  PAIN MANAGEMENT PROCEDURES WITHOUT IV SEDATION    Ariana Ivon scheduled to see Dr. Malia Vivas to undergo the following procedure:  Right Sacroiliac joint and right piriformis    Procedure Date: ____9/30/22____  You will receive a phone call the day prior to your procedure to confirm a time of arrival.    Report to the [place] [id number], Registration office on the 1st floor in the hospital, after check in and signing of paper work you will then go to the second floor to the surgery center. 1. Stop the following medications prior to the procedure:    none  Stop blood thinners as directed before the injection, with permission from your cardiologist or primary care physician. We will send a letter to them requesting permission to hold the blood thinners. If you take Warfarin (Coumadin), you must have your blood drawn for an INR the day before the procedure. INR must be less than 1.5. if not complete prior to check in the procedure this will be drawn at the procedure center prior to having the procedure completed. 2.  Take all routine medications unless otherwise instructed. Ok to take vitamins and antiinflammatory medications    3. EATING & DRINKING:  Ok to eat or drink before the injection - no IV sedation will be used. 4. If you are allergic to contrast or iodine, you must take benadryl and prednisone prior to the injection to prevent an allergic reaction. Follow the directions on the prescription for the times to take the medication. 5. Oral valium can be prescribed if your are anxious about the injections or feel that you can not lay still during the injection. If you take valium, you must have a . Make arrangements for a family member or friend to drive you to the surgery center.   Your ride must stay in the hospital while you are having the

## 2022-09-15 NOTE — PROGRESS NOTES
Inhale 2 puffs into the lungs every 4 hours as needed for Wheezing or Shortness of Breath 1 Inhaler 0    furosemide (LASIX) 20 MG tablet TAKE 1 TABLET DAILY AS     NEEDED FOR EDEMA 90 tablet 0    vitamin D (CHOLECALCIFEROL) 1000 UNIT TABS tablet Take 1,000 Units by mouth daily         Past Medical History:   Diagnosis Date    Fibroid uterus     removed with laparoscopy     GERD (gastroesophageal reflux disease)     Hyperlipidemia     Rheumatoid arthritis ([address]     Tobacco abuse        Past Surgical History:   Procedure Laterality Date    CHOLECYSTECTOMY      COLONOSCOPY  2018    [id number]    LAPAROSCOPY      exploratory\       Family History   Problem Relation Age of Onset    Breast Cancer Mother         breast , tumor in bowels.  age 80    Heart Disease Father        Social History     Socioeconomic History    Marital status:      Spouse name: None    Number of children: None    Years of education: None    Highest education level: None   Tobacco Use    Smoking status: Every Day     Packs/day: 1.00     Years: 30.00     Pack years: 30.00     Types: Cigarettes    Smokeless tobacco: Never   Substance and Sexual Activity    Alcohol use: Yes     Alcohol/week: 0.0 standard drinks     Comment: social    Drug use: No    Sexual activity: Yes     Partners: Male     Birth control/protection: Post-menopausal     Review of Systems   Constitutional:  Positive for activity change and fatigue. HENT:  Negative for sore throat. Respiratory: Negative. Cardiovascular: Negative. Gastrointestinal: Negative. Genitourinary: Negative. Musculoskeletal:  Positive for arthralgias, back pain and myalgias. Skin: Negative. Allergic/Immunologic: Positive for immunocompromised state. Hematological:  Does not bruise/bleed easily. Psychiatric/Behavioral:  Positive for sleep disturbance. Objective:   Physical Exam  Vitals reviewed. Constitutional:       General: She is awake.  She is not in acute distress. Appearance: Normal appearance. She is well-developed, well-groomed and normal weight. She is not ill-appearing, toxic-appearing or diaphoretic. Interventions: She is not intubated. HENT:      Head: Normocephalic and atraumatic. Eyes:      General: Lids are normal.   Cardiovascular:      Rate and Rhythm: Normal rate. Pulmonary:      Effort: Pulmonary effort is normal. No tachypnea, bradypnea, accessory muscle usage, prolonged expiration, respiratory distress or retractions. She is not intubated. Abdominal:      Palpations: Abdomen is soft. Musculoskeletal:      Lumbar back: Bony tenderness (right PSIS) present. Right foot: No Charcot foot or foot drop. Left foot: No Charcot foot or foot drop. Skin:     General: Skin is warm and dry. Capillary Refill: Capillary refill takes less than 2 seconds. Coloration: Skin is not ashen, jaundiced or pale. Findings: No rash. Nails: There is no clubbing. Neurological:      Mental Status: She is alert and oriented to person, place, and time. GCS: GCS eye subscore is 4. GCS verbal subscore is 5. GCS motor subscore is 6. Cranial Nerves: No cranial nerve deficit. Psychiatric:         Attention and Perception: Attention normal.         Mood and Affect: Mood normal.         Speech: Speech normal.         Behavior: Behavior is cooperative. Cognition and Memory: Cognition normal.         Judgment: Judgment normal.       Assessment / Plan:       Diagnosis Orders   1. Rheumatoid arthritis involving right hip, unspecified whether rheumatoid factor present (HCC)  XR LUMBAR SPINE (2-3 VIEWS)    DISCONTINUED: HYDROcodone-acetaminophen (NORCO) 5-325 MG per tablet      2. Chronic right-sided low back pain with right-sided sciatica  XR LUMBAR SPINE (2-3 VIEWS)    DISCONTINUED: HYDROcodone-acetaminophen (NORCO) 5-325 MG per tablet      3. Pain of right sacroiliac joint        4.  Piriformis syndrome of right side Schedule right SIJ/piriformis injection  Lumbar spine xray  Norco rx prn pain    Controlled Substance Monitoring:    Acute and Chronic Pain Monitoring:   RX Monitoring 9/22/2022   Periodic Controlled Substance Monitoring No signs of potential drug abuse or diversion identified.

## 2022-09-20 ENCOUNTER — TELEPHONE (OUTPATIENT)
Dept: PAIN MANAGEMENT | Age: 57
End: 2022-09-20

## 2022-09-20 ENCOUNTER — HOSPITAL ENCOUNTER (OUTPATIENT)
Dept: GENERAL RADIOLOGY | Age: 57
Discharge: HOME | End: 2022-09-22
Payer: COMMERCIAL

## 2022-09-20 DIAGNOSIS — M54.41 CHRONIC RIGHT-SIDED LOW BACK PAIN WITH RIGHT-SIDED SCIATICA: ICD-10-CM

## 2022-09-20 DIAGNOSIS — M06.9 RHEUMATOID ARTHRITIS INVOLVING RIGHT HIP, UNSPECIFIED WHETHER RHEUMATOID FACTOR PRESENT (HCC): Primary | ICD-10-CM

## 2022-09-20 DIAGNOSIS — G89.29 CHRONIC RIGHT-SIDED LOW BACK PAIN WITH RIGHT-SIDED SCIATICA: ICD-10-CM

## 2022-09-20 PROCEDURE — 72100 X-RAY EXAM L-S SPINE 2/3 VWS: CPT

## 2022-09-20 RX ORDER — OXYCODONE HYDROCHLORIDE AND ACETAMINOPHEN 5; 325 MG/1; MG/1
1 TABLET ORAL 2 TIMES DAILY PRN
Qty: 28 TABLET | Refills: 0 | Status: SHIPPED | OUTPATIENT
Start: 2022-09-20 | End: 2022-10-06 | Stop reason: SDUPTHER

## 2022-09-20 NOTE — TELEPHONE ENCOUNTER
The patient called back and stated that the norco is not effective for her. She stated that her pain is at a 8 out of 10 in her hips and sciatic nerve and she has been trying to heat and take the norco with no relief. She started the norco only 5 days ago, she would like to know if she can switch the medication.  She uses the [facility] [address] Appt:  9/15/2022  Next Appt:   10/14/2022  Med verified in Epic

## 2022-09-22 PROBLEM — G89.29 CHRONIC RIGHT-SIDED LOW BACK PAIN WITH RIGHT-SIDED SCIATICA: Status: ACTIVE | Noted: 2022-09-22

## 2022-09-22 PROBLEM — M54.41 CHRONIC RIGHT-SIDED LOW BACK PAIN WITH RIGHT-SIDED SCIATICA: Status: ACTIVE | Noted: 2022-09-22

## 2022-09-22 PROBLEM — G57.01 PIRIFORMIS SYNDROME OF RIGHT SIDE: Status: ACTIVE | Noted: 2022-09-22

## 2022-09-22 PROBLEM — M53.3 PAIN OF RIGHT SACROILIAC JOINT: Status: ACTIVE | Noted: 2022-09-22

## 2022-09-22 ASSESSMENT — ENCOUNTER SYMPTOMS
GASTROINTESTINAL NEGATIVE: 1
SORE THROAT: 0
BACK PAIN: 1
RESPIRATORY NEGATIVE: 1

## 2022-09-30 ENCOUNTER — HOSPITAL ENCOUNTER (OUTPATIENT)
Age: 57
Setting detail: OUTPATIENT SURGERY
Discharge: HOME | End: 2022-09-30
Attending: PHYSICAL MEDICINE & REHABILITATION | Admitting: PHYSICAL MEDICINE & REHABILITATION
Payer: COMMERCIAL

## 2022-09-30 ENCOUNTER — APPOINTMENT (OUTPATIENT)
Dept: GENERAL RADIOLOGY | Age: 57
End: 2022-09-30
Attending: PHYSICAL MEDICINE & REHABILITATION
Payer: COMMERCIAL

## 2022-09-30 VITALS
SYSTOLIC BLOOD PRESSURE: 134 MMHG | RESPIRATION RATE: 16 BRPM | DIASTOLIC BLOOD PRESSURE: 68 MMHG | TEMPERATURE: 97 F | HEART RATE: 61 BPM | WEIGHT: 152 LBS | BODY MASS INDEX: 25.33 KG/M2 | HEIGHT: 65 IN | OXYGEN SATURATION: 100 %

## 2022-09-30 PROCEDURE — 6360000002 HC RX W HCPCS: Performed by: PHYSICAL MEDICINE & REHABILITATION

## 2022-09-30 PROCEDURE — 27096 INJECT SACROILIAC JOINT: CPT | Performed by: PHYSICAL MEDICINE & REHABILITATION

## 2022-09-30 PROCEDURE — 3600000056 HC PAIN LEVEL 4 BASE: Performed by: PHYSICAL MEDICINE & REHABILITATION

## 2022-09-30 PROCEDURE — 20552 NJX 1/MLT TRIGGER POINT 1/2: CPT | Performed by: PHYSICAL MEDICINE & REHABILITATION

## 2022-09-30 PROCEDURE — 2500000003 HC RX 250 WO HCPCS: Performed by: PHYSICAL MEDICINE & REHABILITATION

## 2022-09-30 PROCEDURE — 3209999900 FLUORO FOR SURGICAL PROCEDURES

## 2022-09-30 PROCEDURE — 7100000010 HC PHASE II RECOVERY - FIRST 15 MIN: Performed by: PHYSICAL MEDICINE & REHABILITATION

## 2022-09-30 PROCEDURE — 6360000004 HC RX CONTRAST MEDICATION: Performed by: PHYSICAL MEDICINE & REHABILITATION

## 2022-09-30 PROCEDURE — 7100000011 HC PHASE II RECOVERY - ADDTL 15 MIN: Performed by: PHYSICAL MEDICINE & REHABILITATION

## 2022-09-30 PROCEDURE — 2709999900 HC NON-CHARGEABLE SUPPLY: Performed by: PHYSICAL MEDICINE & REHABILITATION

## 2022-09-30 RX ORDER — SODIUM CHLORIDE 0.9 % (FLUSH) 0.9 %
5-40 SYRINGE (ML) INJECTION EVERY 12 HOURS SCHEDULED
Status: DISCONTINUED | OUTPATIENT
Start: 2022-09-30 | End: 2022-09-30 | Stop reason: HOSPADM

## 2022-09-30 RX ORDER — ROPIVACAINE HYDROCHLORIDE 5 MG/ML
INJECTION, SOLUTION EPIDURAL; INFILTRATION; PERINEURAL PRN
Status: DISCONTINUED | OUTPATIENT
Start: 2022-09-30 | End: 2022-09-30 | Stop reason: ALTCHOICE

## 2022-09-30 RX ORDER — SODIUM CHLORIDE 0.9 % (FLUSH) 0.9 %
5-40 SYRINGE (ML) INJECTION PRN
Status: DISCONTINUED | OUTPATIENT
Start: 2022-09-30 | End: 2022-09-30 | Stop reason: HOSPADM

## 2022-09-30 RX ORDER — LIDOCAINE HYDROCHLORIDE 20 MG/ML
INJECTION, SOLUTION EPIDURAL; INFILTRATION; INTRACAUDAL; PERINEURAL PRN
Status: DISCONTINUED | OUTPATIENT
Start: 2022-09-30 | End: 2022-09-30 | Stop reason: ALTCHOICE

## 2022-09-30 RX ORDER — SODIUM CHLORIDE 9 MG/ML
INJECTION, SOLUTION INTRAVENOUS PRN
Status: DISCONTINUED | OUTPATIENT
Start: 2022-09-30 | End: 2022-09-30 | Stop reason: HOSPADM

## 2022-09-30 RX ORDER — DEXAMETHASONE SODIUM PHOSPHATE 10 MG/ML
INJECTION INTRAMUSCULAR; INTRAVENOUS PRN
Status: DISCONTINUED | OUTPATIENT
Start: 2022-09-30 | End: 2022-09-30 | Stop reason: ALTCHOICE

## 2022-09-30 ASSESSMENT — ENCOUNTER SYMPTOMS
BACK PAIN: 1
GASTROINTESTINAL NEGATIVE: 1
SORE THROAT: 0
RESPIRATORY NEGATIVE: 1

## 2022-09-30 ASSESSMENT — PAIN DESCRIPTION - LOCATION: LOCATION: HIP

## 2022-09-30 ASSESSMENT — PAIN - FUNCTIONAL ASSESSMENT: PAIN_FUNCTIONAL_ASSESSMENT: 0-10

## 2022-09-30 ASSESSMENT — PAIN DESCRIPTION - DESCRIPTORS
DESCRIPTORS: ACHING
DESCRIPTORS: ACHING

## 2022-09-30 ASSESSMENT — PAIN DESCRIPTION - PAIN TYPE: TYPE: CHRONIC PAIN

## 2022-09-30 ASSESSMENT — PAIN DESCRIPTION - ORIENTATION: ORIENTATION: RIGHT

## 2022-09-30 ASSESSMENT — PAIN DESCRIPTION - FREQUENCY: FREQUENCY: INTERMITTENT

## 2022-09-30 ASSESSMENT — PAIN SCALES - GENERAL: PAINLEVEL_OUTOF10: 3

## 2022-09-30 NOTE — PROGRESS NOTES
rounding in [address] Assessment: Patient was recovering from procedure and sleeping soundly. 09/30/22 1611   Encounter Summary   Encounter Overview/Reason  Attempted Encounter   Service Provided For: Patient   Referral/Consult From: [facility]   Support System Unknown   Last Encounter  09/30/22   Complexity of Encounter Low   Begin Time 1004   End Time  1005   Total Time Calculated 1 min   Encounter    Type Post-Procedural   Assessment/Intervention/Outcome   Assessment Unable to assess  (patient was sleeping)   Intervention Other (Comment)  (patient was sleeping)   Outcome Other (comment)  (patient was sleeping)   Plan and Referrals   Plan/Referrals No future visits requested       Plan: Chaplains are available on site or on call 24/7 for spiritual and emotional support.     Electronically signed by Sol Soliz on 9/30/2022 at 4:12 PM

## 2022-09-30 NOTE — INTERVAL H&P NOTE
I have interviewed and examined the patient and reviewed the recent History and Physical.  There have been no changes to the recent H&P documentation. The surgical consent form has been signed. Last anticoagulant medication use was:-    Premedication taken for contrast allergy? No    Valium taken for oral sedation? No    No outpatient medications have been marked as taking for the 9/30/22 encounter [facility] Encounter). The patient understands the planned operation and its associated risks and benefits and agrees to proceed.         Electronically signed by Jemima Ferro MD on 9/30/2022 at 10:23 AM

## 2022-09-30 NOTE — DISCHARGE INSTRUCTIONS
Home Care after Sacrocooccygeal/Sacroiliac Joint Injection    The doctor has done an injection in your lower back and/or muscle to decrease pain and inflammation. It can also help diagnose the source of your pain. You may feel sore at the injection site for the next 2-4 days. You may apply ice to the site for 20 minutes on and 20 minutes off to decrease pain and discomfort, if needed, for the first 24 hours. After 24 hours, you may use heat if needed. Your pain may subside right away, or it may take a number of days. This is because two medicines were used in the injection. The first, a local anesthetic, will only work for a few hours. The second, a steroid, may not start working for 2-5 days. Some patients have noticed no changes in their pain for up to 2 weeks. There may be a time after the local anesthetic wears off that you feel like you have more pain. This is called pain flare. If this happens:  Limit your activities for the first 24 hours to those that you can do without pain. Keep on taking your pain medicine as prescribed. Sometimes, some patients have had facial and neck flushing, anxiety or nervousness, and mood swings with the use of steroids. These symptoms most often occur within the first 24-48 hours and do not require any treatment. They should go away on their own within one week. If you have diabetes, steroids will cause your blood sugar to increase. Make sure your primary doctor is aware of this and that you have orders to treat your blood sugar to keep it within your normal range. You may have some weakness for the next 3-5 hours due to the anesthetic used. Take it easy. No baths or soaking the injection site for 24 hours after the procedure. Taking a shower is okay. You may resume taking your routine medicines after the procedure including pain medicines as prescribed. Resume any medications held for the procedure (blood thinners, aspirin, anti-inflammatories).     If you do not already have a follow-up appointment, call your referring doctors office to make one to discuss your results within 2-4 weeks after the treatment. Your doctor will have the report within 7-10 days. You will be given a pain log to complete for the next 14 days. Complete this form and make a copy for your own records. Then, mail it back to us or drop it off at the pain management clinic. We will need this information to decide the next step in your treatment plan. Signs of infection  Fever greater than 100.4°F by mouth for 2 readings taken 4 hours apart  Increased redness, swelling around the site  Any drainage from the site     If you have any new symptoms or any signs of infection, please call [phone] during business hours to notify us. You can also notify your primary care physician. After hours, nights and weekends, call [phone].

## 2022-09-30 NOTE — H&P
Subjective:      Patient ID: Heriberto Eddy is a 56 y.o. female. Chief Complaint   Patient presents with    Hip Pain     RIGHT       HPI Initial new patient consult. Pain Assessment  Location of Pain: Hip  Location Modifiers: Right  Severity of Pain: 7  Quality of Pain: Aching (SHOOTING)  Duration of Pain: Persistent  Frequency of Pain: Constant  Aggravating Factors: Walking, Standing, Bending, Kneeling, Squatting  Limiting Behavior: Yes  Relieving Factors: Heat, Rest  Result of Injury: No  Work-Related Injury: No  Are there other pain locations you wish to document?: No    No Known Allergies    Outpatient Medications Marked as Taking for the 9/15/22 encounter (Office Visit) with PARAS Brady CNP   Medication Sig Dispense Refill    ENBREL SURECLICK 50 MG/ML SOAJ       predniSONE (DELTASONE) 5 MG tablet       sulfaSALAzine (AZULFIDINE) 500 MG tablet       [DISCONTINUED] HYDROcodone-acetaminophen (NORCO) 5-325 MG per tablet Take 1 tablet by mouth 2 times daily as needed for Pain for up to 14 days.  Take lowest dose possible to manage pain 28 tablet 0    PARoxetine (PAXIL) 10 MG tablet TAKE 1 TABLET EVERY MORNINGWITH THE 40 MG DOSE 90 tablet 0    potassium chloride (KLOR-CON M20) 20 MEQ extended release tablet TAKE 1 TABLET DAILY 90 tablet 1    naproxen (NAPROSYN) 500 MG tablet Take 1 tablet by mouth 2 times daily (with meals) 60 tablet 2    PARoxetine (PAXIL) 40 MG tablet TAKE 1 TABLET DAILY 90 tablet 3    esomeprazole (NEXIUM) 40 MG delayed release capsule TAKE 1 CAPSULE EVERY       MORNING BEFORE BREAKFAST 90 capsule 3    cyclobenzaprine (FLEXERIL) 10 MG tablet Take 10 mg by mouth 3 times daily as needed for Muscle spasms      atorvastatin (LIPITOR) 20 MG tablet 1 po daily 90 tablet 3    Multiple Vitamins-Minerals (MULTIPLE VITAMINS/WOMENS) TABS Take 1 tablet by mouth daily      Tofacitinib Citrate (XELJANZ PO) Take by mouth      albuterol sulfate HFA (VENTOLIN HFA) 108 (90 Base) MCG/ACT inhaler Inhale 2 puffs into the lungs every 4 hours as needed for Wheezing or Shortness of Breath 1 Inhaler 0    furosemide (LASIX) 20 MG tablet TAKE 1 TABLET DAILY AS     NEEDED FOR EDEMA 90 tablet 0    vitamin D (CHOLECALCIFEROL) 1000 UNIT TABS tablet Take 1,000 Units by mouth daily         Past Medical History:   Diagnosis Date    Fibroid uterus     removed with laparoscopy     GERD (gastroesophageal reflux disease)     Hyperlipidemia     Rheumatoid arthritis ([address]     Tobacco abuse        Past Surgical History:   Procedure Laterality Date    CHOLECYSTECTOMY      COLONOSCOPY  2018    [id number]    LAPAROSCOPY      exploratory\       Family History   Problem Relation Age of Onset    Breast Cancer Mother         breast , tumor in bowels.  age 80    Heart Disease Father        Social History     Socioeconomic History    Marital status:      Spouse name: None    Number of children: None    Years of education: None    Highest education level: None   Tobacco Use    Smoking status: Every Day     Packs/day: 1.00     Years: 30.00     Pack years: 30.00     Types: Cigarettes    Smokeless tobacco: Never   Substance and Sexual Activity    Alcohol use: Yes     Alcohol/week: 0.0 standard drinks     Comment: social    Drug use: No    Sexual activity: Yes     Partners: Male     Birth control/protection: Post-menopausal     Review of Systems   Constitutional:  Positive for activity change and fatigue. HENT:  Negative for sore throat. Respiratory: Negative. Cardiovascular: Negative. Gastrointestinal: Negative. Genitourinary: Negative. Musculoskeletal:  Positive for arthralgias, back pain and myalgias. Skin: Negative. Allergic/Immunologic: Positive for immunocompromised state. Hematological:  Does not bruise/bleed easily. Psychiatric/Behavioral:  Positive for sleep disturbance. Objective:   Physical Exam  Vitals reviewed. Constitutional:       General: She is awake.  She is not in acute distress. Appearance: Normal appearance. She is well-developed, well-groomed and normal weight. She is not ill-appearing, toxic-appearing or diaphoretic. Interventions: She is not intubated. HENT:      Head: Normocephalic and atraumatic. Eyes:      General: Lids are normal.   Cardiovascular:      Rate and Rhythm: Normal rate. Pulmonary:      Effort: Pulmonary effort is normal. No tachypnea, bradypnea, accessory muscle usage, prolonged expiration, respiratory distress or retractions. She is not intubated. Abdominal:      Palpations: Abdomen is soft. Musculoskeletal:      Lumbar back: Bony tenderness (right PSIS) present. Right foot: No Charcot foot or foot drop. Left foot: No Charcot foot or foot drop. Skin:     General: Skin is warm and dry. Capillary Refill: Capillary refill takes less than 2 seconds. Coloration: Skin is not ashen, jaundiced or pale. Findings: No rash. Nails: There is no clubbing. Neurological:      Mental Status: She is alert and oriented to person, place, and time. GCS: GCS eye subscore is 4. GCS verbal subscore is 5. GCS motor subscore is 6. Cranial Nerves: No cranial nerve deficit. Psychiatric:         Attention and Perception: Attention normal.         Mood and Affect: Mood normal.         Speech: Speech normal.         Behavior: Behavior is cooperative. Cognition and Memory: Cognition normal.         Judgment: Judgment normal.       Assessment / Plan:       Diagnosis Orders   1. Rheumatoid arthritis involving right hip, unspecified whether rheumatoid factor present (HCC)  XR LUMBAR SPINE (2-3 VIEWS)    DISCONTINUED: HYDROcodone-acetaminophen (NORCO) 5-325 MG per tablet      2. Chronic right-sided low back pain with right-sided sciatica  XR LUMBAR SPINE (2-3 VIEWS)    DISCONTINUED: HYDROcodone-acetaminophen (NORCO) 5-325 MG per tablet      3. Pain of right sacroiliac joint        4.  Piriformis syndrome of right side Schedule right SIJ/piriformis injection  Lumbar spine xray  Norco rx prn pain    Controlled Substance Monitoring:    Acute and Chronic Pain Monitoring:   RX Monitoring 9/22/2022   Periodic Controlled Substance Monitoring No signs of potential drug abuse or diversion identified.

## 2022-09-30 NOTE — OP NOTE
SACROILIAC JOINT INJECTION    9/30/22  The patient was counseled at length about the risks of mahad Covid-19 during their perioperative period and any recovery window from their procedure. The patient was made aware that mahad Covid-19  may worsen their prognosis for recovering from their procedure  and lend to a higher morbidity and/or mortality risk. All material risks, benefits, and reasonable alternatives including postponing the procedure were discussed. The patient does wish to proceed with the procedure at this time. Surgeon: Scooby Hernandez MD    Pre-operative Diagnosis:   Hospital Problems             Last Modified POA    * (Principal) Pain of right sacroiliac joint 9/30/2022 Yes    Piriformis syndrome of right side 9/30/2022 Yes       Post-operative Diagnosis: Same    Assistants: none    INDICATION::Please see H&P for details on previous treatments, examination findings, and work up. Right  sacroiliac joint injection with arthrography is requested for diagnostic reasons. Conservative treatment was ineffective i.e.: ice, NSAIDS, rest, narcotic medication, chiropractic care, physical therapy and message therapy. Patient is unable to perform the following ADL's: ambulating and grooming     Pain Assessment: 0-10  Pain Level: 7     Pain Orientation: Lower, Right  Pain Location: Back  Pain Descriptors: Aching    Last Plain films: 2020    EXAMINATION: Right sacroiliac joint injection with arthrography. CONSENT:  Written consent was obtained from the patient on preprinted consent form after explaining the procedure, indications, potential complications and outcomes. Alternative treatments were also discussed. DISCUSSION:  The patient was sterilely prepped and draped in the usual fashion in the prone position. Time out was verified for correct patient, side, level and procedure. SEDATION:   No conscious sedation was performed during the procedure.   The patient remained awake and conversed throughout the procedure. The patient underwent pulse oximetry and blood pressure monitoring independently by a trained observer, as well as by a physician. PROCEDURES #1 to #3:  Under image-intensifier control, a standard technique was employed, a 22 gauge needle x 5 inch spinal needle was guided successfully to cannulate the Right sacroiliac joint via a posterior lateral approach. Instillation of .5 mL of Omnipaque 240 contrast medium demonstrated contiguous flow into the joint and the joint capsule. No vascular spread was noted. Digital subtraction was not employed to evaluate for vascular spread. The patient was monitored for any untoward reaction to contrast medium before proceeding with procedure #2. Then 3.0 mL of equal volumes of 0.50% ropivacaine, 2% lidocaine and betamethasone (Celestone 6 mg/mL), dexamethasone (Decadron 10 mg/mL), methylprednisolone (Depo-Medrol 80 mg/mL was injected into each joint. PIRIFORMIS MUSCLE TRIGGER POINT INJECTION:   Using a 22 gauge needle x 5 inch spinal needle and fluoroscopic imaging the Right piriformis muscle was accessed just inferior and lateral to the inferior margin of the Right sacroiliac joint. Then, 0.3 mL of Omnipaque 240 confirmed avascular injection into the muscle, and 3 mL of 2% lidocaine was injected into the muscle and the needle removed. PROVOCATION: The patient did not report pain reproduction in a concordant distribution upon capsular distention of the Right sacroiliac joints. ARTHROGRAPHY: The Right sacroiliac arthrogram revealed contrast in the joint space and inferior recesses; no contrast extravasation. EBL: no blood loss    SPECIMEN: none    The patient tolerated the procedure well and without complications and was noted to be in stable condition prior to discharge from the procedure center with discharge instructions. IMPRESSIONS:  Right  sacroiliac arthrogram is abnormal: . Maricruz Balling   Right  sacroiliac  joint injection negative for provocation of the patient's pain symptoms. Right piriformis muscle injection    RECOMMENDATIONS:  Complete and return Post-Procedure Pain and Activity Diary.   Contact the [address] for symptom exacerbation, fever or unusual symptoms. Post-procedure care according to verbal and written discharge instructions    PELVIC FLUOROSCOPIC IMAGE INTERPRETATION    EXAMINATION:  AP and lateral views. FLUORO TIME: 21 seconds    DISCUSSION:  Spot views of the spine reveal normal alignment and segmentation. Spinal needles are positioned in the Right sacroiliac joint. Contrast outlines the joint space and reveals excellent contrast flow. Spinal needles are placed just anterior to the sacrum for the piriformis muscle injection. Contrast pattern is consistent with contrast in the muscle. Visualized spine reveals See radiology report. Soft tissues reveal no abnormalities.     IMPRESSION:  Right sacroiliac joint arthrogram  with satiafactory needle placement and contrast dispersal.     Electronically signed by Armida Dorantes MD on 9/30/2022 at 10:32 AM

## 2022-10-05 DIAGNOSIS — M06.9 RHEUMATOID ARTHRITIS INVOLVING RIGHT HIP, UNSPECIFIED WHETHER RHEUMATOID FACTOR PRESENT (HCC): ICD-10-CM

## 2022-10-05 DIAGNOSIS — G89.29 CHRONIC RIGHT-SIDED LOW BACK PAIN WITH RIGHT-SIDED SCIATICA: ICD-10-CM

## 2022-10-05 DIAGNOSIS — M54.41 CHRONIC RIGHT-SIDED LOW BACK PAIN WITH RIGHT-SIDED SCIATICA: ICD-10-CM

## 2022-10-05 NOTE — TELEPHONE ENCOUNTER
Morgan Arriola called requesting a refill of the below medication which has been pended for you:     Requested Prescriptions     Pending Prescriptions Disp Refills    oxyCODONE-acetaminophen (PERCOCET) 5-325 MG per tablet 28 tablet 0     Sig: Take 1 tablet by mouth 2 times daily as needed for Pain for up to 14 days. 9/15/2022       10/14/2022     No Known Allergies      [place]AR[place] checked for [place], [place], and [place]: Percocet 5mg  #28. Due NOW. Please note no DS11 available as this is a New Pt.   Thank You

## 2022-10-06 RX ORDER — OXYCODONE HYDROCHLORIDE AND ACETAMINOPHEN 5; 325 MG/1; MG/1
1 TABLET ORAL 2 TIMES DAILY PRN
Qty: 60 TABLET | Refills: 0 | Status: SHIPPED | OUTPATIENT
Start: 2022-10-06 | End: 2022-11-04 | Stop reason: SDUPTHER

## 2022-10-20 RX ORDER — POTASSIUM CHLORIDE 20 MEQ/1
TABLET, EXTENDED RELEASE ORAL
Qty: 90 TABLET | Refills: 1 | Status: SHIPPED | OUTPATIENT
Start: 2022-10-20

## 2022-11-04 ENCOUNTER — OFFICE VISIT (OUTPATIENT)
Dept: PAIN MANAGEMENT | Age: 57
End: 2022-11-04
Payer: COMMERCIAL

## 2022-11-04 ENCOUNTER — HOSPITAL ENCOUNTER (OUTPATIENT)
Age: 57
Setting detail: SPECIMEN
Discharge: HOME | End: 2022-11-04
Payer: COMMERCIAL

## 2022-11-04 VITALS
OXYGEN SATURATION: 97 % | HEIGHT: 65 IN | HEART RATE: 71 BPM | DIASTOLIC BLOOD PRESSURE: 70 MMHG | WEIGHT: 156.13 LBS | RESPIRATION RATE: 19 BRPM | SYSTOLIC BLOOD PRESSURE: 110 MMHG | BODY MASS INDEX: 26.01 KG/M2

## 2022-11-04 DIAGNOSIS — M06.9 RHEUMATOID ARTHRITIS INVOLVING RIGHT HIP, UNSPECIFIED WHETHER RHEUMATOID FACTOR PRESENT (HCC): ICD-10-CM

## 2022-11-04 DIAGNOSIS — G89.29 CHRONIC RIGHT-SIDED LOW BACK PAIN WITH RIGHT-SIDED SCIATICA: ICD-10-CM

## 2022-11-04 DIAGNOSIS — Z79.891 ENCOUNTER FOR LONG-TERM OPIATE ANALGESIC USE: ICD-10-CM

## 2022-11-04 DIAGNOSIS — M54.41 CHRONIC RIGHT-SIDED LOW BACK PAIN WITH RIGHT-SIDED SCIATICA: ICD-10-CM

## 2022-11-04 DIAGNOSIS — Z02.83 ENCOUNTER FOR DRUG SCREENING: Primary | ICD-10-CM

## 2022-11-04 DIAGNOSIS — Z02.83 ENCOUNTER FOR DRUG SCREENING: ICD-10-CM

## 2022-11-04 PROCEDURE — G0481 DRUG TEST DEF 8-14 CLASSES: HCPCS

## 2022-11-04 PROCEDURE — 99214 OFFICE O/P EST MOD 30 MIN: CPT | Performed by: NURSE PRACTITIONER

## 2022-11-04 RX ORDER — OXYCODONE HYDROCHLORIDE AND ACETAMINOPHEN 5; 325 MG/1; MG/1
1 TABLET ORAL 2 TIMES DAILY PRN
Qty: 60 TABLET | Refills: 0 | Status: SHIPPED | OUTPATIENT
Start: 2022-11-04 | End: 2022-12-04

## 2022-11-04 RX ORDER — TOFACITINIB 11 MG/1
TABLET, FILM COATED, EXTENDED RELEASE ORAL
COMMUNITY
Start: 2022-08-08

## 2022-11-04 ASSESSMENT — ENCOUNTER SYMPTOMS
BACK PAIN: 1
SORE THROAT: 0
GASTROINTESTINAL NEGATIVE: 1
RESPIRATORY NEGATIVE: 1

## 2022-11-04 NOTE — PROGRESS NOTES
Subjective:      Patient ID: Jermaine Aburto is a 56 y.o. female. Chief Complaint   Patient presents with    Back Pain       HPI Sacroiliac joint injection with relief, satisfied with results    Pain Assessment  Location of Pain: Back  Location Modifiers: Posterior, Medial, Lateral  Severity of Pain: 3  Quality of Pain: Aching  Duration of Pain: Persistent  Frequency of Pain: Constant  Aggravating Factors: Standing, Walking  Limiting Behavior: Yes  Relieving Factors: Rest, Heat (meds)  Result of Injury: No  Work-Related Injury: No  Are there other pain locations you wish to document?: No    No Known Allergies    Outpatient Medications Marked as Taking for the 11/4/22 encounter (Office Visit) with PARAS Quispe CNP   Medication Sig Dispense Refill    XELJANZ XR 11 MG TB24       oxyCODONE-acetaminophen (PERCOCET) 5-325 MG per tablet Take 1 tablet by mouth 2 times daily as needed for Pain for up to 30 days.  60 tablet 0    potassium chloride (KLOR-CON M20) 20 MEQ extended release tablet TAKE 1 TABLET DAILY 90 tablet 1    ENBREL SURECLICK 50 MG/ML SOAJ       predniSONE (DELTASONE) 5 MG tablet       sulfaSALAzine (AZULFIDINE) 500 MG tablet       PARoxetine (PAXIL) 10 MG tablet TAKE 1 TABLET EVERY MORNINGWITH THE 40 MG DOSE 90 tablet 0    naproxen (NAPROSYN) 500 MG tablet Take 1 tablet by mouth 2 times daily (with meals) 60 tablet 2    PARoxetine (PAXIL) 40 MG tablet TAKE 1 TABLET DAILY 90 tablet 3    esomeprazole (NEXIUM) 40 MG delayed release capsule TAKE 1 CAPSULE EVERY       MORNING BEFORE BREAKFAST 90 capsule 3    cyclobenzaprine (FLEXERIL) 10 MG tablet Take 10 mg by mouth 3 times daily as needed for Muscle spasms      atorvastatin (LIPITOR) 20 MG tablet 1 po daily 90 tablet 3    Multiple Vitamins-Minerals (MULTIPLE VITAMINS/WOMENS) TABS Take 1 tablet by mouth daily      Tofacitinib Citrate (XELJANZ PO) Take by mouth      albuterol sulfate HFA (VENTOLIN HFA) 108 (90 Base) MCG/ACT inhaler Inhale 2 puffs into is not in acute distress. Appearance: Normal appearance. She is well-developed, well-groomed and normal weight. She is not ill-appearing, toxic-appearing or diaphoretic. Interventions: She is not intubated. HENT:      Head: Normocephalic and atraumatic. Eyes:      General: Lids are normal.   Cardiovascular:      Rate and Rhythm: Normal rate. Pulmonary:      Effort: Pulmonary effort is normal. No tachypnea, bradypnea, accessory muscle usage, prolonged expiration, respiratory distress or retractions. She is not intubated. Abdominal:      Palpations: Abdomen is soft. Musculoskeletal:      Lumbar back: Bony tenderness (right PSIS) present. Right foot: No Charcot foot or foot drop. Left foot: No Charcot foot or foot drop. Skin:     General: Skin is warm and dry. Capillary Refill: Capillary refill takes less than 2 seconds. Coloration: Skin is not ashen, jaundiced or pale. Findings: No rash. Nails: There is no clubbing. Neurological:      Mental Status: She is alert and oriented to person, place, and time. GCS: GCS eye subscore is 4. GCS verbal subscore is 5. GCS motor subscore is 6. Cranial Nerves: No cranial nerve deficit. Psychiatric:         Attention and Perception: Attention normal.         Mood and Affect: Mood normal.         Speech: Speech normal.         Behavior: Behavior is cooperative. Cognition and Memory: Cognition normal.         Judgment: Judgment normal.       Assessment / Plan:       Diagnosis Orders   1. Encounter for drug screening  Pain Management Drug Screen      2. Encounter for long-term opiate analgesic use  Pain Management Drug Screen      3. Chronic right-sided low back pain with right-sided sciatica  Pain Management Drug Screen    oxyCODONE-acetaminophen (PERCOCET) 5-325 MG per tablet      4.  Rheumatoid arthritis involving right hip, unspecified whether rheumatoid factor present ([facility] [address]  oxyCODONE-acetaminophen (PERCOCET) 5-325 MG per tablet        Chronic pain diagnoses such as   1. Encounter for drug screening    2. Encounter for long-term opiate analgesic use    3. Chronic right-sided low back pain with right-sided sciatica    4. Rheumatoid arthritis involving right hip, unspecified whether rheumatoid factor present ([facility] [address]     controlled on current medication regime, wll continue current pain medications to improve quality of life and function. Random drug screen today for opiate medication compliance. Controlled Substance Monitoring:    Acute and Chronic Pain Monitoring:   RX Monitoring 11/4/2022   Periodic Controlled Substance Monitoring No signs of potential drug abuse or diversion identified.;Obtaining appropriate analgesic effect of treatment.

## 2022-11-08 LAB
6-ACETYLMORPHINE, UR: NOT DETECTED
7-AMINOCLONAZEPAM, URINE: NOT DETECTED
ALPHA-OH-ALPRAZ, URINE: NOT DETECTED
ALPHA-OH-MIDAZOLAM, URINE: NOT DETECTED
ALPRAZOLAM, URINE: NOT DETECTED
AMPHETAMINES, URINE: NOT DETECTED
BARBITURATES, URINE: NOT DETECTED
BENZOYLECGONINE, UR: NOT DETECTED
BUPRENORPHINE URINE: NOT DETECTED
CARISOPRODOL, UR: NOT DETECTED
CLONAZEPAM, URINE: NOT DETECTED
CODEINE, URINE: NOT DETECTED
CREATININE URINE: 39.4 MG/DL (ref 20–400)
DIAZEPAM, URINE: NOT DETECTED
EER PAIN MGT DRUG PANEL, HIGH RES/EMIT U: NORMAL
ETHYL GLUCURONIDE UR: NOT DETECTED
FENTANYL URINE: NOT DETECTED
GABAPENTIN: NOT DETECTED
HYDROCODONE, URINE: NOT DETECTED
HYDROMORPHONE, URINE: NOT DETECTED
LORAZEPAM, URINE: NOT DETECTED
MARIJUANA METAB, UR: PRESENT
MDA, UR: NOT DETECTED
MDEA, EVE, UR: NOT DETECTED
MDMA URINE: NOT DETECTED
MEPERIDINE METAB, UR: NOT DETECTED
METHADONE, URINE: NOT DETECTED
METHAMPHETAMINE, URINE: NOT DETECTED
METHYLPHENIDATE: NOT DETECTED
MIDAZOLAM, URINE: NOT DETECTED
MORPHINE URINE: NOT DETECTED
NALOXONE URINE: NOT DETECTED
NORBUPRENORPHINE, URINE: NOT DETECTED
NORDIAZEPAM, URINE: NOT DETECTED
NORFENTANYL, URINE: NOT DETECTED
NORHYDROCODONE, URINE: NOT DETECTED
NOROXYCODONE, URINE: PRESENT
NOROXYMORPHONE, URINE: NOT DETECTED
OXAZEPAM, URINE: NOT DETECTED
OXYCODONE URINE: NOT DETECTED
OXYMORPHONE, URINE: NOT DETECTED
PAIN MGT DRUG PANEL, HI RES, UR: NORMAL
PCP,URINE: NOT DETECTED
PHENTERMINE, UR: NOT DETECTED
PREGABALIN: NOT DETECTED
TAPENTADOL, URINE: NOT DETECTED
TAPENTADOL-O-SULFATE, URINE: NOT DETECTED
TEMAZEPAM, URINE: NOT DETECTED
TRAMADOL, URINE: NOT DETECTED
ZOLPIDEM METABOLITE (ZCA), URINE: NOT DETECTED
ZOLPIDEM, URINE: NOT DETECTED

## 2022-11-18 DIAGNOSIS — E78.5 HYPERLIPIDEMIA, UNSPECIFIED HYPERLIPIDEMIA TYPE: ICD-10-CM

## 2022-11-18 RX ORDER — ESOMEPRAZOLE MAGNESIUM 40 MG/1
CAPSULE, DELAYED RELEASE ORAL
Qty: 90 CAPSULE | Refills: 0 | Status: SHIPPED | OUTPATIENT
Start: 2022-11-18

## 2022-11-18 RX ORDER — ATORVASTATIN CALCIUM 20 MG/1
TABLET, FILM COATED ORAL
Qty: 90 TABLET | Refills: 0 | Status: SHIPPED | OUTPATIENT
Start: 2022-11-18

## 2022-11-18 NOTE — TELEPHONE ENCOUNTER
Ermelinda Hooker called requesting a refill of the below medication which has been pended for you:     Requested Prescriptions     Pending Prescriptions Disp Refills    esomeprazole ([employer]) 40 MG delayed release capsule [Pharmacy Med Name: ESOMEPRA MAG CAP 40MG DR] 90 capsule 0     Sig: TAKE 1 CAPSULE EVERY       MORNING BEFORE BREAKFAST    atorvastatin (LIPITOR) 20 MG tablet [Pharmacy Med Name: ATORVASTATIN TAB 20MG] 90 tablet 0     Sig: TAKE 1 TABLET DAILY       Last Appointment Date: 8/11/2022  Next Appointment Date: Visit date not found    No Known Allergies

## 2022-11-21 RX ORDER — PAROXETINE 10 MG/1
TABLET, FILM COATED ORAL
Qty: 90 TABLET | Refills: 0 | Status: SHIPPED | OUTPATIENT
Start: 2022-11-21

## 2022-11-21 NOTE — TELEPHONE ENCOUNTER
Mordecai Shackleton called requesting a refill of the below medication which has been pended for you:     Requested Prescriptions     Pending Prescriptions Disp Refills    PARoxetine (PAXIL) 10 MG tablet [Pharmacy Med Name: PAROXETINE TAB 10MG HCL] 90 tablet 0     Sig: TAKE 1 TABLET EVERY [address] 40 MG DOSE       Last Appointment Date: 8/11/2022  Next Appointment Date: 12/29/2022    No Known Allergies

## 2022-12-19 DIAGNOSIS — G89.29 CHRONIC RIGHT-SIDED LOW BACK PAIN WITH RIGHT-SIDED SCIATICA: ICD-10-CM

## 2022-12-19 DIAGNOSIS — M06.9 RHEUMATOID ARTHRITIS INVOLVING RIGHT HIP, UNSPECIFIED WHETHER RHEUMATOID FACTOR PRESENT (HCC): ICD-10-CM

## 2022-12-19 DIAGNOSIS — M54.41 CHRONIC RIGHT-SIDED LOW BACK PAIN WITH RIGHT-SIDED SCIATICA: ICD-10-CM

## 2022-12-19 RX ORDER — OXYCODONE HYDROCHLORIDE AND ACETAMINOPHEN 5; 325 MG/1; MG/1
1 TABLET ORAL 2 TIMES DAILY PRN
Qty: 60 TABLET | Refills: 0 | Status: SHIPPED | OUTPATIENT
Start: 2022-12-19 | End: 2023-01-18

## 2022-12-19 NOTE — TELEPHONE ENCOUNTER
OAR[place] Report checked for [place], and [place]: 11/4/22 percocet 5-325mg #60. Due 12/4/22.     Last Appt:  11/4/2022  Next Appt:   1/4/2023  Med verified in Epic

## 2022-12-22 ENCOUNTER — TELEPHONE (OUTPATIENT)
Dept: OBGYN | Age: 57
End: 2022-12-22

## 2022-12-29 ENCOUNTER — OFFICE VISIT (OUTPATIENT)
Dept: FAMILY MEDICINE CLINIC | Age: 57
End: 2022-12-29

## 2022-12-29 VITALS
HEART RATE: 80 BPM | DIASTOLIC BLOOD PRESSURE: 72 MMHG | BODY MASS INDEX: 26.42 KG/M2 | SYSTOLIC BLOOD PRESSURE: 124 MMHG | WEIGHT: 158.6 LBS | HEIGHT: 65 IN | OXYGEN SATURATION: 98 % | RESPIRATION RATE: 18 BRPM

## 2022-12-29 DIAGNOSIS — Z00.00 ENCOUNTER FOR PREVENTIVE HEALTH EXAMINATION: ICD-10-CM

## 2022-12-29 DIAGNOSIS — Z72.0 TOBACCO ABUSE: ICD-10-CM

## 2022-12-29 DIAGNOSIS — E78.5 HYPERLIPIDEMIA, UNSPECIFIED HYPERLIPIDEMIA TYPE: ICD-10-CM

## 2022-12-29 DIAGNOSIS — Z87.891 PERSONAL HISTORY OF TOBACCO USE: Primary | ICD-10-CM

## 2022-12-29 DIAGNOSIS — Z12.31 ENCOUNTER FOR SCREENING MAMMOGRAM FOR MALIGNANT NEOPLASM OF BREAST: ICD-10-CM

## 2022-12-29 DIAGNOSIS — K21.9 GASTROESOPHAGEAL REFLUX DISEASE WITHOUT ESOPHAGITIS: ICD-10-CM

## 2022-12-29 DIAGNOSIS — M05.9 RHEUMATOID ARTHRITIS WITH POSITIVE RHEUMATOID FACTOR, INVOLVING UNSPECIFIED SITE (HCC): ICD-10-CM

## 2022-12-29 RX ORDER — TIZANIDINE 4 MG/1
TABLET ORAL
COMMUNITY
Start: 2022-11-18

## 2022-12-29 ASSESSMENT — PATIENT HEALTH QUESTIONNAIRE - PHQ9
SUM OF ALL RESPONSES TO PHQ QUESTIONS 1-9: 0
2. FEELING DOWN, DEPRESSED OR HOPELESS: 0
SUM OF ALL RESPONSES TO PHQ QUESTIONS 1-9: 0
1. LITTLE INTEREST OR PLEASURE IN DOING THINGS: 0
SUM OF ALL RESPONSES TO PHQ9 QUESTIONS 1 & 2: 0
SUM OF ALL RESPONSES TO PHQ QUESTIONS 1-9: 0
SUM OF ALL RESPONSES TO PHQ QUESTIONS 1-9: 0

## 2022-12-29 ASSESSMENT — ENCOUNTER SYMPTOMS
COUGH: 0
RHINORRHEA: 0
GASTROINTESTINAL NEGATIVE: 1
VOICE CHANGE: 0
SINUS PRESSURE: 0
SORE THROAT: 0
EYES NEGATIVE: 1
RESPIRATORY NEGATIVE: 1

## 2022-12-29 NOTE — PATIENT INSTRUCTIONS

## 2022-12-29 NOTE — PROGRESS NOTES
Subjective:      Patient ID: Esmer Padilla is a 56 y.o. female. Hyperlipidemia    Pharyngitis  Associated symptoms include arthralgias (right wrist/mcps, toes/fingers). Pertinent negatives include no congestion, coughing, fatigue, fever or sore throat. Routine follow up on chronic medical conditions, annual preventive exam, refills, and review of updated labs. Her rheumatoid arthritis is ongoing. She has been on methotrexate and humira. Not having complete remission in her migratory arthralgias. Moving through all the joints by report. Currently having more pain  chronically in the toes. Compliant with medications at present. Some chronic hoarse voice. Smoker. Inhaled steroid use. Saw ent x 2 and had direct visualization of cords with just irritation. She had some benefit from mycelex troches, giving some indication that fungal etiology may have been going on. Seems better at present. No gerd symptoms. Past Medical History:   Diagnosis Date    Fibroid uterus     removed with laparoscopy     GERD (gastroesophageal reflux disease)     Hyperlipidemia     Rheumatoid arthritis ([facility] [address]     Tobacco abuse        Past Surgical History:   Procedure Laterality Date    BACK INJECTION Right 9/30/2022    Right SACROILIAC  & Piriformis JOINT INJECTION performed by Starr Way MD at [address]  04/27/2018    [id number]    LAPAROSCOPY      exploratory\     Current Outpatient Medications   Medication Sig Dispense Refill    tiZANidine (ZANAFLEX) 4 MG tablet       oxyCODONE-acetaminophen (PERCOCET) 5-325 MG per tablet Take 1 tablet by mouth 2 times daily as needed for Pain for up to 30 days.  60 tablet 0    PARoxetine (PAXIL) 10 MG tablet TAKE 1 TABLET EVERY MORNINGWITH THE 40 MG DOSE 90 tablet 0    esomeprazole (NEXIUM) 40 MG delayed release capsule TAKE 1 CAPSULE EVERY       MORNING BEFORE BREAKFAST 90 capsule 0    atorvastatin (LIPITOR) 20 MG tablet TAKE 1 TABLET DAILY 90 tablet 0    potassium chloride (KLOR-CON M20) 20 MEQ extended release tablet TAKE 1 TABLET DAILY 90 tablet 1    ENBREL SURECLICK 50 MG/ML SOAJ       naproxen (NAPROSYN) 500 MG tablet Take 1 tablet by mouth 2 times daily (with meals) 60 tablet 2    PARoxetine (PAXIL) 40 MG tablet TAKE 1 TABLET DAILY 90 tablet 3    cyclobenzaprine (FLEXERIL) 10 MG tablet Take 10 mg by mouth 3 times daily as needed for Muscle spasms      Multiple Vitamins-Minerals (MULTIPLE VITAMINS/WOMENS) TABS Take 1 tablet by mouth daily      albuterol sulfate HFA (VENTOLIN HFA) 108 (90 Base) MCG/ACT inhaler Inhale 2 puffs into the lungs every 4 hours as needed for Wheezing or Shortness of Breath 1 Inhaler 0    vitamin D (CHOLECALCIFEROL) 1000 UNIT TABS tablet Take 1,000 Units by mouth daily       No current facility-administered medications for this visit. No Known Allergies      Review of Systems   Constitutional:  Negative for fatigue and fever. HENT:  Negative for congestion, rhinorrhea, sinus pressure, sore throat and voice change. Eyes: Negative. Respiratory: Negative. Negative for cough. Cardiovascular: Negative. Gastrointestinal: Negative. Endocrine: Negative. Genitourinary: Negative. Musculoskeletal:  Positive for arthralgias (right wrist/mcps, toes/fingers). Skin: Negative. Allergic/Immunologic: Positive for immunocompromised state. Neurological: Negative. Hematological: Negative. Psychiatric/Behavioral:  Negative for dysphoric mood. Objective:   Physical Exam  Constitutional:       General: She is not in acute distress. Appearance: She is well-developed. HENT:      Head: Normocephalic and atraumatic. Mouth/Throat:      Pharynx: Uvula midline. Eyes:      General: No scleral icterus. Conjunctiva/sclera: Conjunctivae normal.   Neck:      Thyroid: No thyromegaly. Cardiovascular:      Rate and Rhythm: Normal rate and regular rhythm.       Heart sounds: Normal heart sounds. No murmur heard. Pulmonary:      Effort: Pulmonary effort is normal. No respiratory distress. Breath sounds: Normal breath sounds. No wheezing. Abdominal:      General: Bowel sounds are normal. There is no distension. Palpations: Abdomen is soft. Tenderness: There is no abdominal tenderness. There is no rebound. Musculoskeletal:         General: No tenderness. Normal range of motion. Cervical back: Neck supple. Skin:     General: Skin is warm and dry. Findings: No erythema or rash. Neurological:      Mental Status: She is alert and oriented to person, place, and time. Psychiatric:         Behavior: Behavior normal.         Thought Content: Thought content normal.         Judgment: Judgment normal.     /72   Pulse 80   Resp 18   Ht 5' 5\" (1.651 m)   Wt 158 lb 9.6 oz (71.9 kg)   LMP 01/18/2018 (Within Months)   SpO2 98%   BMI 26.39 kg/m²     Assessment:          Encounter Diagnoses   Name Primary? Encounter for preventive health examination     Personal history of tobacco use Yes    Encounter for screening mammogram for malignant neoplasm of breast     Hyperlipidemia, unspecified hyperlipidemia type     Tobacco abuse     Rheumatoid arthritis with positive rheumatoid factor, involving unspecified site ([facility] [place] [facility] [address]     Gastroesophageal reflux disease without esophagitis        Plan:      Hyperlipidemia: updating labs on lipitor. Plan to cont. Same. Edema: some lower ext. Edema, complicated by RA swelling of ankles and other joints. Using lasix at 20mg/day to good effect. She stopped this, swelling better. Still has prn use. Rec. Using the potassium supplement prn with the lasix instead of daily. Tobacco abuse: ongoing 1/2 ppd. Rec. Cessation , contemplating at present. Failed chantix. RA: mtx not seemingly effective at present. Still having migratory flares of joints. Still taking prednisone at 10mg/day.   Following with rheumatologist , Donna Marcos currently seems to be working better then humira in the past.       Chaka Fox: quiescent on nexium at present. She reports symptoms fairly quickly off the medication. Opting to cont. Daily dosing. Increasing irritability and mood swings. Doing well on paroxetine. Taking a total of 50mg/day, combo of 40mg and 10 mg. Feeling stable ,desires continued dosing. jet; started cpap. Mostly compliant. Mask ends up on the floor some nights. encouraged ongoing compliance. Not as compliant nightly at present. Colonoscopy updated 4/27/17,  Adenoma x 1. Repeat 5 yrs. Menopausal, no menses in the last year. Discussed calcium and vitamin D supplement 2/day.

## 2023-01-04 ENCOUNTER — OFFICE VISIT (OUTPATIENT)
Dept: PAIN MANAGEMENT | Age: 58
End: 2023-01-04
Payer: COMMERCIAL

## 2023-01-04 VITALS
BODY MASS INDEX: 25.69 KG/M2 | RESPIRATION RATE: 16 BRPM | DIASTOLIC BLOOD PRESSURE: 76 MMHG | HEART RATE: 94 BPM | OXYGEN SATURATION: 97 % | WEIGHT: 154.4 LBS | SYSTOLIC BLOOD PRESSURE: 136 MMHG

## 2023-01-04 DIAGNOSIS — M06.9 RHEUMATOID ARTHRITIS INVOLVING RIGHT HIP, UNSPECIFIED WHETHER RHEUMATOID FACTOR PRESENT (HCC): ICD-10-CM

## 2023-01-04 DIAGNOSIS — M53.3 PAIN OF RIGHT SACROILIAC JOINT: Primary | ICD-10-CM

## 2023-01-04 DIAGNOSIS — G57.01 PIRIFORMIS SYNDROME OF RIGHT SIDE: ICD-10-CM

## 2023-01-04 DIAGNOSIS — M54.41 CHRONIC RIGHT-SIDED LOW BACK PAIN WITH RIGHT-SIDED SCIATICA: ICD-10-CM

## 2023-01-04 DIAGNOSIS — G89.29 CHRONIC RIGHT-SIDED LOW BACK PAIN WITH RIGHT-SIDED SCIATICA: ICD-10-CM

## 2023-01-04 PROCEDURE — 99214 OFFICE O/P EST MOD 30 MIN: CPT | Performed by: NURSE PRACTITIONER

## 2023-01-04 RX ORDER — MULTIVIT WITH MINERALS/LUTEIN
TABLET ORAL
COMMUNITY

## 2023-01-04 RX ORDER — GABAPENTIN 300 MG/1
CAPSULE ORAL
Qty: 90 CAPSULE | Refills: 1 | Status: SHIPPED | OUTPATIENT
Start: 2023-01-04 | End: 2023-02-08

## 2023-01-04 ASSESSMENT — PATIENT HEALTH QUESTIONNAIRE - PHQ9
SUM OF ALL RESPONSES TO PHQ QUESTIONS 1-9: 0
SUM OF ALL RESPONSES TO PHQ QUESTIONS 1-9: 0
SUM OF ALL RESPONSES TO PHQ9 QUESTIONS 1 & 2: 0
1. LITTLE INTEREST OR PLEASURE IN DOING THINGS: 0
SUM OF ALL RESPONSES TO PHQ QUESTIONS 1-9: 0
SUM OF ALL RESPONSES TO PHQ QUESTIONS 1-9: 0
2. FEELING DOWN, DEPRESSED OR HOPELESS: 0

## 2023-01-06 ASSESSMENT — ENCOUNTER SYMPTOMS
RESPIRATORY NEGATIVE: 1
SORE THROAT: 0
GASTROINTESTINAL NEGATIVE: 1
BACK PAIN: 1

## 2023-01-06 NOTE — PROGRESS NOTES
Subjective:      Patient ID: Kaila Paul is a 56 y.o. female. Chief Complaint   Patient presents with    Joint Pain     2 mo f/u joint and hip pain. HPI Here today for routine pain clinic recheck. Pain Assessment  Location of Pain: Other (Comment) (Gen. arthritis)  Location Modifiers: Left, Right, Anterior, Posterior, Superior, Inferior, Medial, Lateral  Severity of Pain: 7  Quality of Pain: Dull, Aching  Duration of Pain: Persistent  Frequency of Pain: Constant  Aggravating Factors: Stairs, Walking, Standing, Squatting, Kneeling, Exercise, Straightening, Stretching, Bending  Limiting Behavior: Yes  Relieving Factors: Rest (Rx)  Result of Injury: No  Work-Related Injury: No    No Known Allergies    Outpatient Medications Marked as Taking for the 1/4/23 encounter (Office Visit) with PARAS Carvalho CNP   Medication Sig Dispense Refill    Ascorbic Acid (VITAMIN C) 1000 MG tablet 1 tablet Orally Once a day for 30 day(s)      gabapentin (NEURONTIN) 300 MG capsule Intended supply: 90 days 90 capsule 1    tiZANidine (ZANAFLEX) 4 MG tablet       oxyCODONE-acetaminophen (PERCOCET) 5-325 MG per tablet Take 1 tablet by mouth 2 times daily as needed for Pain for up to 30 days.  60 tablet 0    PARoxetine (PAXIL) 10 MG tablet TAKE 1 TABLET EVERY MORNINGWITH THE 40 MG DOSE 90 tablet 0    esomeprazole (NEXIUM) 40 MG delayed release capsule TAKE 1 CAPSULE EVERY       MORNING BEFORE BREAKFAST 90 capsule 0    atorvastatin (LIPITOR) 20 MG tablet TAKE 1 TABLET DAILY 90 tablet 0    ENBREL SURECLICK 50 MG/ML SOAJ       naproxen (NAPROSYN) 500 MG tablet Take 1 tablet by mouth 2 times daily (with meals) 60 tablet 2    PARoxetine (PAXIL) 40 MG tablet TAKE 1 TABLET DAILY 90 tablet 3    cyclobenzaprine (FLEXERIL) 10 MG tablet Take 10 mg by mouth 3 times daily as needed for Muscle spasms      Multiple Vitamins-Minerals (MULTIPLE VITAMINS/WOMENS) TABS Take 1 tablet by mouth daily      vitamin D (CHOLECALCIFEROL) 1000 UNIT TABS tablet Take 1,000 Units by mouth daily         Past Medical History:   Diagnosis Date    Fibroid uterus     removed with laparoscopy     GERD (gastroesophageal reflux disease)     Hyperlipidemia     Rheumatoid arthritis ([address]     Tobacco abuse        Past Surgical History:   Procedure Laterality Date    BACK INJECTION Right 2022    Right SACROILIAC  & Piriformis JOINT INJECTION performed by Stephan Butterfield MD at [address] [place] [address]  2018    [id number]    LAPAROSCOPY      exploratory\       Family History   Problem Relation Age of Onset    Breast Cancer Mother         breast , tumor in bowels.  age 80    Heart Disease Father        Social History     Socioeconomic History    Marital status:      Spouse name: None    Number of children: None    Years of education: None    Highest education level: None   Tobacco Use    Smoking status: Every Day     Packs/day: 1.00     Years: 30.00     Pack years: 30.00     Types: Cigarettes    Smokeless tobacco: Never    Tobacco comments: Will see PCP PRN cessation needs. Substance and Sexual Activity    Alcohol use: Yes     Alcohol/week: 0.0 standard drinks     Comment: social    Drug use: No    Sexual activity: Yes     Partners: Male     Birth control/protection: Post-menopausal     Review of Systems   Constitutional:  Positive for activity change and fatigue. HENT:  Negative for sore throat. Respiratory: Negative. Cardiovascular: Negative. Gastrointestinal: Negative. Genitourinary: Negative. Musculoskeletal:  Positive for arthralgias, back pain and myalgias. Skin: Negative. Allergic/Immunologic: Positive for immunocompromised state. Hematological:  Does not bruise/bleed easily. Psychiatric/Behavioral:  Positive for sleep disturbance. Objective:   Physical Exam  Vitals reviewed. Constitutional:       General: She is awake. She is not in acute distress.      Appearance: Normal appearance. She is well-developed, well-groomed and normal weight. She is not ill-appearing, toxic-appearing or diaphoretic. Interventions: She is not intubated. HENT:      Head: Normocephalic and atraumatic. Eyes:      General: Lids are normal.   Cardiovascular:      Rate and Rhythm: Normal rate. Pulmonary:      Effort: Pulmonary effort is normal. No tachypnea, bradypnea, accessory muscle usage, prolonged expiration, respiratory distress or retractions. She is not intubated. Abdominal:      Palpations: Abdomen is soft. Musculoskeletal:      Lumbar back: Bony tenderness (right PSIS) present. Right foot: No Charcot foot or foot drop. Left foot: No Charcot foot or foot drop. Skin:     General: Skin is warm and dry. Capillary Refill: Capillary refill takes less than 2 seconds. Coloration: Skin is not ashen, jaundiced or pale. Findings: No rash. Nails: There is no clubbing. Neurological:      Mental Status: She is alert and oriented to person, place, and time. GCS: GCS eye subscore is 4. GCS verbal subscore is 5. GCS motor subscore is 6. Cranial Nerves: No cranial nerve deficit. Psychiatric:         Attention and Perception: Attention normal.         Mood and Affect: Mood normal.         Speech: Speech normal.         Behavior: Behavior is cooperative. Cognition and Memory: Cognition normal.         Judgment: Judgment normal.       Assessment / Plan:       Diagnosis Orders   1. Pain of right sacroiliac joint        2. Chronic right-sided low back pain with right-sided sciatica        3. Rheumatoid arthritis involving right hip, unspecified whether rheumatoid factor present ([facility]        4. Piriformis syndrome of right side          Chronic pain diagnoses such as   1. Pain of right sacroiliac joint    2. Chronic right-sided low back pain with right-sided sciatica    3.  Rheumatoid arthritis involving right hip, unspecified whether rheumatoid factor present ([facility]    4. Piriformis syndrome of right side     controlled on current medication regime, wll continue current pain medications to improve quality of life and function. Add gabapentin 300m tid for additional pain relief    Controlled Substance Monitoring:    Acute and Chronic Pain Monitoring:   RX Monitoring 1/6/2023   Periodic Controlled Substance Monitoring No signs of potential drug abuse or diversion identified.;Obtaining appropriate analgesic effect of treatment.

## 2023-01-10 ENCOUNTER — HOSPITAL ENCOUNTER (OUTPATIENT)
Age: 58
Discharge: HOME | End: 2023-01-10
Payer: COMMERCIAL

## 2023-01-10 DIAGNOSIS — E78.5 HYPERLIPIDEMIA, UNSPECIFIED HYPERLIPIDEMIA TYPE: ICD-10-CM

## 2023-01-10 DIAGNOSIS — Z00.00 ENCOUNTER FOR PREVENTIVE HEALTH EXAMINATION: ICD-10-CM

## 2023-01-10 LAB
ABSOLUTE EOS #: 0.08 K/UL (ref 0–0.4)
ABSOLUTE IMMATURE GRANULOCYTE: 0 K/UL (ref 0–0.3)
ABSOLUTE LYMPH #: 5.59 K/UL (ref 1–4.8)
ABSOLUTE MONO #: 0.65 K/UL (ref 0.1–1.2)
ALBUMIN SERPL-MCNC: 3.8 G/DL (ref 3.5–5.2)
ALBUMIN/GLOBULIN RATIO: 1.1 (ref 1–2.5)
ALP BLD-CCNC: 121 U/L (ref 35–104)
ALT SERPL-CCNC: 30 U/L (ref 5–33)
ANION GAP SERPL CALCULATED.3IONS-SCNC: 10 MMOL/L (ref 9–17)
AST SERPL-CCNC: 33 U/L
BASOPHILS # BLD: 0 % (ref 0–1)
BASOPHILS ABSOLUTE: 0 K/UL (ref 0–0.2)
BILIRUB SERPL-MCNC: 0.2 MG/DL (ref 0.3–1.2)
BUN BLDV-MCNC: 11 MG/DL (ref 6–20)
BUN/CREAT BLD: 21 (ref 9–20)
CALCIUM SERPL-MCNC: 9.5 MG/DL (ref 8.6–10.4)
CHLORIDE BLD-SCNC: 107 MMOL/L (ref 98–107)
CHOLESTEROL/HDL RATIO: 5.1
CHOLESTEROL: 148 MG/DL
CO2: 22 MMOL/L (ref 20–31)
CREAT SERPL-MCNC: 0.52 MG/DL (ref 0.5–0.9)
EOSINOPHILS RELATIVE PERCENT: 1 % (ref 1–7)
GFR SERPL CREATININE-BSD FRML MDRD: >60 ML/MIN/1.73M2
GLUCOSE BLD-MCNC: 100 MG/DL (ref 70–99)
HCT VFR BLD CALC: 40.5 % (ref 36.3–47.1)
HDLC SERPL-MCNC: 29 MG/DL
HEMOGLOBIN: 13.5 G/DL (ref 11.9–15.1)
IMMATURE GRANULOCYTES: 0 %
LDL CHOLESTEROL: 88 MG/DL (ref 0–130)
LYMPHOCYTES # BLD: 69 % (ref 16–46)
MCH RBC QN AUTO: 31.3 PG (ref 25.2–33.5)
MCHC RBC AUTO-ENTMCNC: 33.3 G/DL (ref 25.2–33.5)
MCV RBC AUTO: 94 FL (ref 82.6–102.9)
MONOCYTES # BLD: 8 % (ref 4–11)
MORPHOLOGY: ABNORMAL
MORPHOLOGY: ABNORMAL
NRBC AUTOMATED: 0 PER 100 WBC
PDW BLD-RTO: 12.4 % (ref 11.8–14.4)
PLATELET # BLD: 254 K/UL (ref 138–453)
PMV BLD AUTO: 9.3 FL (ref 8.1–13.5)
POTASSIUM SERPL-SCNC: 4 MMOL/L (ref 3.7–5.3)
RBC # BLD: 4.31 M/UL (ref 3.95–5.11)
SEG NEUTROPHILS: 22 % (ref 43–77)
SEGMENTED NEUTROPHILS ABSOLUTE COUNT: 1.78 K/UL (ref 1.5–8.1)
SODIUM BLD-SCNC: 139 MMOL/L (ref 135–144)
TOTAL PROTEIN: 7.4 G/DL (ref 6.4–8.3)
TRIGL SERPL-MCNC: 157 MG/DL
WBC # BLD: 8.1 K/UL (ref 3.5–11.3)

## 2023-01-10 PROCEDURE — 85025 COMPLETE CBC W/AUTO DIFF WBC: CPT

## 2023-01-10 PROCEDURE — 80053 COMPREHEN METABOLIC PANEL: CPT

## 2023-01-10 PROCEDURE — 80061 LIPID PANEL: CPT

## 2023-01-10 PROCEDURE — 36415 COLL VENOUS BLD VENIPUNCTURE: CPT

## 2023-01-18 DIAGNOSIS — M06.9 RHEUMATOID ARTHRITIS INVOLVING RIGHT HIP, UNSPECIFIED WHETHER RHEUMATOID FACTOR PRESENT (HCC): ICD-10-CM

## 2023-01-18 DIAGNOSIS — G89.29 CHRONIC RIGHT-SIDED LOW BACK PAIN WITH RIGHT-SIDED SCIATICA: ICD-10-CM

## 2023-01-18 DIAGNOSIS — M54.41 CHRONIC RIGHT-SIDED LOW BACK PAIN WITH RIGHT-SIDED SCIATICA: ICD-10-CM

## 2023-01-18 RX ORDER — OXYCODONE HYDROCHLORIDE AND ACETAMINOPHEN 5; 325 MG/1; MG/1
TABLET ORAL
Qty: 60 TABLET | Refills: 0 | Status: SHIPPED | OUTPATIENT
Start: 2023-01-18 | End: 2023-02-17

## 2023-01-18 NOTE — TELEPHONE ENCOUNTER
Patient called refill line for their   Percocet 5-325 mg  [facility]     Last Appt:  1/4/2023  Next Appt:   Visit date not found  Med verified in [id number]: 11/4/22    OARRS [place] checked for [place], and [place]:   Percocet 5-325 mg  Last filled 12/19/22  Due 1/18/23

## 2023-01-20 RX ORDER — PAROXETINE HYDROCHLORIDE 40 MG/1
TABLET, FILM COATED ORAL
Qty: 90 TABLET | Refills: 1 | Status: SHIPPED | OUTPATIENT
Start: 2023-01-20

## 2023-01-20 NOTE — TELEPHONE ENCOUNTER
Raymond Miranda called requesting a refill of the below medication which has been pended for you:     Requested Prescriptions     Pending Prescriptions Disp Refills    PARoxetine (PAXIL) 40 MG tablet [Pharmacy Med Name: PAROXETINE TAB 40MG HCL] 90 tablet 3     Sig: TAKE 1 TABLET DAILY       Last Appointment Date: 12/29/2022  Next Appointment Date: 6/29/2023    No Known Allergies

## 2023-02-02 ENCOUNTER — OFFICE VISIT (OUTPATIENT)
Dept: ORTHOPEDIC SURGERY | Age: 58
End: 2023-02-02

## 2023-02-02 VITALS
HEART RATE: 84 BPM | SYSTOLIC BLOOD PRESSURE: 118 MMHG | WEIGHT: 154 LBS | DIASTOLIC BLOOD PRESSURE: 61 MMHG | HEIGHT: 65 IN | BODY MASS INDEX: 25.66 KG/M2

## 2023-02-02 DIAGNOSIS — M70.62 GREATER TROCHANTERIC BURSITIS OF LEFT HIP: ICD-10-CM

## 2023-02-02 DIAGNOSIS — M25.552 LEFT HIP PAIN: Primary | ICD-10-CM

## 2023-02-02 RX ORDER — BUPIVACAINE HYDROCHLORIDE 5 MG/ML
3 INJECTION, SOLUTION PERINEURAL ONCE
Status: COMPLETED | OUTPATIENT
Start: 2023-02-02 | End: 2023-02-02

## 2023-02-02 RX ORDER — TRIAMCINOLONE ACETONIDE 40 MG/ML
40 INJECTION, SUSPENSION INTRA-ARTICULAR; INTRAMUSCULAR ONCE
Status: COMPLETED | OUTPATIENT
Start: 2023-02-02 | End: 2023-02-02

## 2023-02-02 RX ADMIN — BUPIVACAINE HYDROCHLORIDE 15 MG: 5 INJECTION, SOLUTION PERINEURAL at 10:51

## 2023-02-02 RX ADMIN — TRIAMCINOLONE ACETONIDE 40 MG: 40 INJECTION, SUSPENSION INTRA-ARTICULAR; INTRAMUSCULAR at 10:51

## 2023-02-02 NOTE — PROGRESS NOTES
Orthopaedic Progress Note      CHIEF COMPLAINT: Left hip pain    HISTORY OF PRESENT ILLNESS:       Ms. Samia Cuevas  is a 56 y.o. female  who presents with left hip pain. Patient did undergo a left total hip replacement in December 2021. Patient was doing very well. She did develop bursitis previously. She states she has had previous injections. She is noted to have severe pain over her left lateral hip. Pain will radiate down her IT band to her knee at times. She is unable to lay on that side in bed secondary of the severe pain. She states she recently seen a rheumatologist and wanted her evaluated by Ortho. She denies any groin pain or problems with her hip implant. Past Medical History:    Past Medical History:   Diagnosis Date    Fibroid uterus     removed with laparoscopy     GERD (gastroesophageal reflux disease)     Hyperlipidemia     Rheumatoid arthritis ([facility]     Tobacco abuse        Past Surgical History:    Past Surgical History:   Procedure Laterality Date    BACK INJECTION Right 9/30/2022    Right SACROILIAC  & Piriformis JOINT INJECTION performed by Armida Dorantes MD at [address] [place] [address]  04/27/2018    [id number]    LAPAROSCOPY      exploratory\         Current  Medications:  Current Outpatient Medications   Medication Sig Dispense Refill    PARoxetine (PAXIL) 40 MG tablet TAKE 1 TABLET DAILY 90 tablet 1    oxyCODONE-acetaminophen (PERCOCET) 5-325 MG per tablet TAKE 1 TABLET BY MOUTH 2 TIMES A DAY AS NEEDED FOR PAIN FOR UP TO 30 DAYS. REDUCE DOSES TAKEN AS PAIN BECOMES MANAGEABLE.  60 tablet 0    Ascorbic Acid (VITAMIN C) 1000 MG tablet 1 tablet Orally Once a day for 30 day(s)      gabapentin (NEURONTIN) 300 MG capsule Intended supply: 90 days 90 capsule 1    tiZANidine (ZANAFLEX) 4 MG tablet       PARoxetine (PAXIL) 10 MG tablet TAKE 1 TABLET EVERY MORNINGWITH THE 40 MG DOSE 90 tablet 0    esomeprazole (NEXIUM) 40 MG delayed release capsule TAKE 1 CAPSULE EVERY       MORNING BEFORE BREAKFAST 90 capsule 0    atorvastatin (LIPITOR) 20 MG tablet TAKE 1 TABLET DAILY 90 tablet 0    ENBREL SURECLICK 50 MG/ML SOAJ       naproxen (NAPROSYN) 500 MG tablet Take 1 tablet by mouth 2 times daily (with meals) 60 tablet 2    cyclobenzaprine (FLEXERIL) 10 MG tablet Take 10 mg by mouth 3 times daily as needed for Muscle spasms      Multiple Vitamins-Minerals (MULTIPLE VITAMINS/WOMENS) TABS Take 1 tablet by mouth daily      vitamin D (CHOLECALCIFEROL) 1000 UNIT TABS tablet Take 1,000 Units by mouth daily      albuterol sulfate HFA (VENTOLIN HFA) 108 (90 Base) MCG/ACT inhaler Inhale 2 puffs into the lungs every 4 hours as needed for Wheezing or Shortness of Breath (Patient not taking: Reported on 2023) 1 Inhaler 0     Current Facility-Administered Medications   Medication Dose Route Frequency Provider Last Rate Last Admin    bupivacaine (MARCAINE) 0.5 % injection 15 mg  3 mL Intra-artICUlar Once PARAS Loyola CNP        triamcinolone acetonide (KENALOG-40) injection 40 mg  40 mg Intra-artICUlar Once Hormel FoodsPARAS - HAILE           Allergies:  Patient has no known allergies. Social History:   Social History     Tobacco Use   Smoking Status Every Day    Packs/day: 1.00    Years: 30.00    Pack years: 30.00    Types: Cigarettes   Smokeless Tobacco Never   Tobacco Comments    Will see PCP PRN cessation needs. Social History     Substance and Sexual Activity   Alcohol Use Yes    Alcohol/week: 0.0 standard drinks    Comment: social     Social History     Substance and Sexual Activity   Drug Use No       Family History:  Family History   Problem Relation Age of Onset    Breast Cancer Mother         breast , tumor in bowels.  age 80    Heart Disease Father        REVIEW OF SYSTEMS:  Constitutional: Denies any fever, chills. Musculoskeletal: Positive for left lateral hip pain.       PHYSICAL EXAM:  [unfilled]  General appearance:  Alert and oriented x 3. No apparent distress, appears stated age, calm and cooperative. Musculoskeletal: Left hip was examined. Skin is intact no rashes lesions or drainage. No redness warmth or cellulitis. She has severe pain to palpation over the left hip greater trochanteric bursa. She denies pain in the groin with internal/external rotation. Knee flexion and extension is intact. Denies calf pain to palpation. Toe flexion and extension is intact. Sensation intact neurovascularly intact to the left foot. [place] DATA:  CBC:   Lab Results   Component Value Date/Time    WBC 8.1 01/10/2023 10:28 AM    HGB 13.5 01/10/2023 10:28 AM     01/10/2023 10:28 AM     BMP:    Lab Results   Component Value Date/Time     01/10/2023 10:28 AM    K 4.0 01/10/2023 10:28 AM     01/10/2023 10:28 AM    CO2 22 01/10/2023 10:28 AM    BUN 11 01/10/2023 10:28 AM    CREATININE 0.52 01/10/2023 10:28 AM    CALCIUM 9.5 01/10/2023 10:28 AM    GLUCOSE 100 01/10/2023 10:28 AM     PT/INR:    Lab Results   Component Value Date/Time    PROTIME 9.7 07/19/2016 06:50 AM    INR 0.9 07/19/2016 06:50 AM     Troponin:  No results found for: TROPONINI  No results for input(s): LIPASE, AMYLASE in the last 72 hours. No results for input(s): AST, ALT, BILIDIR, BILITOT, ALKPHOS in the last 72 hours. Uric Acid:  No components found for: URIC  Urine Culture:  No components found for: CURINE    Radiology:   No results found       Diagnosis Orders   1. Left hip pain  MT ARTHROCENTESIS ASPIR&/INJ MAJOR JT/BURSA W/O US    bupivacaine (MARCAINE) 0.5 % injection 15 mg    triamcinolone acetonide (KENALOG-40) injection 40 mg      2. Greater trochanteric bursitis of left hip              PLAN:  Plan at this time discussed options with patient she would like to proceed with a left hip corticosteroid injection into her left hip bursa for greater trochanteric hip bursitis. Activity as tolerated weightbearing as tolerated. Ice to the left hip as needed. We will see patient back as needed for continued issues. If she is still having pain would recommend her trying therapy for this issue. Procedures    SC ARTHROCENTESIS ASPIR&/INJ MAJOR JT/BURSA W/O US        Procedure: Left hip was prepped in sterile fashion with alcohol. A 22-gauge needle was introduced into the left hip greater trochanteric bursa. 3 mils of half percent Marcaine 40 mg of Kenalog were injected. Hemostasis achieved. Dry sterile bandage applied. Patient tolerated procedure well.       Electronically signed by PARAS Skinner CNP on 2/2/2023 at 10:40 AM

## 2023-02-09 DIAGNOSIS — G89.29 CHRONIC RIGHT-SIDED LOW BACK PAIN WITH RIGHT-SIDED SCIATICA: ICD-10-CM

## 2023-02-09 DIAGNOSIS — M54.41 CHRONIC RIGHT-SIDED LOW BACK PAIN WITH RIGHT-SIDED SCIATICA: ICD-10-CM

## 2023-02-09 DIAGNOSIS — M06.9 RHEUMATOID ARTHRITIS INVOLVING RIGHT HIP, UNSPECIFIED WHETHER RHEUMATOID FACTOR PRESENT (HCC): ICD-10-CM

## 2023-02-09 NOTE — TELEPHONE ENCOUNTER
Jose Orozco called requesting a refill of the below medication which has been pended for you:     Requested Prescriptions     Pending Prescriptions Disp Refills    oxyCODONE-acetaminophen (PERCOCET) 5-325 MG per tablet 60 tablet 0       Last Appointment Date: 1/4/2023  Next Appointment Date: Visit date not found    No Known Allergies    Pharmacy:  clinic pharm      Last DS11 10/4/22. Please review. OAR[place], and [place]:   1/18/23   #60  . Due 2/17/23.

## 2023-02-10 RX ORDER — OXYCODONE HYDROCHLORIDE AND ACETAMINOPHEN 5; 325 MG/1; MG/1
1 TABLET ORAL 2 TIMES DAILY PRN
Qty: 60 TABLET | Refills: 0 | Status: SHIPPED | OUTPATIENT
Start: 2023-02-17 | End: 2023-03-19

## 2023-02-10 RX ORDER — ESOMEPRAZOLE MAGNESIUM 40 MG/1
CAPSULE, DELAYED RELEASE ORAL
Qty: 90 CAPSULE | Refills: 1 | Status: SHIPPED | OUTPATIENT
Start: 2023-02-10

## 2023-02-10 NOTE — TELEPHONE ENCOUNTER
Tali Estrada called requesting a refill of the below medication which has been pended for you:     Requested Prescriptions     Pending Prescriptions Disp Refills    esomeprazole ([email or web address]) 40 MG delayed release capsule [Pharmacy Med Name: ESOMEPRA MAG CAP 40MG DR] 90 capsule 0     Sig: TAKE 1 CAPSULE EVERY       [address]       Last Appointment Date: 12/29/2022  Next Appointment Date: 6/29/2023    No Known Allergies

## 2023-02-20 RX ORDER — PAROXETINE 10 MG/1
TABLET, FILM COATED ORAL
Qty: 90 TABLET | Refills: 1 | Status: SHIPPED | OUTPATIENT
Start: 2023-02-20

## 2023-03-14 DIAGNOSIS — M54.41 CHRONIC RIGHT-SIDED LOW BACK PAIN WITH RIGHT-SIDED SCIATICA: ICD-10-CM

## 2023-03-14 DIAGNOSIS — M06.9 RHEUMATOID ARTHRITIS INVOLVING RIGHT HIP, UNSPECIFIED WHETHER RHEUMATOID FACTOR PRESENT (HCC): ICD-10-CM

## 2023-03-14 DIAGNOSIS — G89.29 CHRONIC RIGHT-SIDED LOW BACK PAIN WITH RIGHT-SIDED SCIATICA: ICD-10-CM

## 2023-03-15 RX ORDER — GABAPENTIN 300 MG/1
CAPSULE ORAL
Qty: 90 CAPSULE | Refills: 1 | Status: SHIPPED | OUTPATIENT
Start: 2023-03-15 | End: 2023-04-26

## 2023-03-15 RX ORDER — OXYCODONE HYDROCHLORIDE AND ACETAMINOPHEN 5; 325 MG/1; MG/1
1 TABLET ORAL 2 TIMES DAILY PRN
Qty: 60 TABLET | Refills: 0 | Status: SHIPPED | OUTPATIENT
Start: 2023-03-19 | End: 2023-04-21 | Stop reason: SDUPTHER

## 2023-03-27 DIAGNOSIS — E78.5 HYPERLIPIDEMIA, UNSPECIFIED HYPERLIPIDEMIA TYPE: ICD-10-CM

## 2023-03-27 RX ORDER — ATORVASTATIN CALCIUM 20 MG/1
TABLET, FILM COATED ORAL
Qty: 90 TABLET | Refills: 0 | Status: SHIPPED | OUTPATIENT
Start: 2023-03-27

## 2023-04-21 DIAGNOSIS — M54.41 CHRONIC RIGHT-SIDED LOW BACK PAIN WITH RIGHT-SIDED SCIATICA: ICD-10-CM

## 2023-04-21 DIAGNOSIS — M06.9 RHEUMATOID ARTHRITIS INVOLVING RIGHT HIP, UNSPECIFIED WHETHER RHEUMATOID FACTOR PRESENT (HCC): ICD-10-CM

## 2023-04-21 DIAGNOSIS — G89.29 CHRONIC RIGHT-SIDED LOW BACK PAIN WITH RIGHT-SIDED SCIATICA: ICD-10-CM

## 2023-04-21 RX ORDER — OXYCODONE HYDROCHLORIDE AND ACETAMINOPHEN 5; 325 MG/1; MG/1
1 TABLET ORAL 2 TIMES DAILY PRN
Qty: 60 TABLET | Refills: 0 | Status: ON HOLD | OUTPATIENT
Start: 2023-04-21 | End: 2023-05-21

## 2023-04-21 NOTE — TELEPHONE ENCOUNTER
Jaspreet Calderon called requesting a refill of the below medication which has been pended for you:     Requested Prescriptions     Pending Prescriptions Disp Refills    oxyCODONE-acetaminophen (PERCOCET) 5-325 MG per tablet 60 tablet 0     Sig: Take 1 tablet by mouth 2 times daily as needed for Pain for up to 30 days. Max Daily Amount: 2 tablets       Last Appointment Date: 1/4/2023  Next Appointment Date: Visit date not found    No Known Allergies    Pharmacy:  [facility]    OAS Report checked for [place], and [place]:Percocet 5/325  03/19/23 #60. Due NOW.

## 2023-04-23 ENCOUNTER — HOSPITAL ENCOUNTER (EMERGENCY)
Age: 58
Discharge: ANOTHER HOSPITAL | End: 2023-04-23
Attending: EMERGENCY MEDICINE
Payer: COMMERCIAL

## 2023-04-23 ENCOUNTER — HOSPITAL ENCOUNTER (INPATIENT)
Age: 58
LOS: 2 days | Discharge: HOME | DRG: 246 | End: 2023-04-25
Attending: INTERNAL MEDICINE | Admitting: INTERNAL MEDICINE
Payer: COMMERCIAL

## 2023-04-23 ENCOUNTER — APPOINTMENT (OUTPATIENT)
Dept: GENERAL RADIOLOGY | Age: 58
End: 2023-04-23
Payer: COMMERCIAL

## 2023-04-23 VITALS
HEART RATE: 74 BPM | WEIGHT: 155 LBS | RESPIRATION RATE: 16 BRPM | DIASTOLIC BLOOD PRESSURE: 93 MMHG | TEMPERATURE: 96.5 F | OXYGEN SATURATION: 100 % | BODY MASS INDEX: 25.79 KG/M2 | SYSTOLIC BLOOD PRESSURE: 158 MMHG

## 2023-04-23 DIAGNOSIS — I21.3 ST ELEVATION MYOCARDIAL INFARCTION (STEMI), UNSPECIFIED ARTERY (HCC): Primary | ICD-10-CM

## 2023-04-23 LAB
ABSOLUTE EOS #: 0 K/UL (ref 0–0.4)
ABSOLUTE IMMATURE GRANULOCYTE: 0 K/UL (ref 0–0.3)
ABSOLUTE LYMPH #: 13.12 K/UL (ref 1–4.8)
ABSOLUTE MONO #: 1.35 K/UL (ref 0.1–1.2)
ACTIVATED CLOTTING TIME: 126 SEC (ref 79–149)
ANION GAP SERPL CALCULATED.3IONS-SCNC: 14 MMOL/L (ref 9–17)
ANION GAP SERPL CALCULATED.3IONS-SCNC: 18 MMOL/L (ref 9–17)
BASOPHILS # BLD: 0 % (ref 0–1)
BASOPHILS ABSOLUTE: 0 K/UL (ref 0–0.2)
BUN SERPL-MCNC: 13 MG/DL (ref 6–20)
BUN SERPL-MCNC: 14 MG/DL (ref 6–20)
BUN/CREAT BLD: 21 (ref 9–20)
CALCIUM SERPL-MCNC: 8.8 MG/DL (ref 8.6–10.4)
CALCIUM SERPL-MCNC: 9 MG/DL (ref 8.6–10.4)
CHLORIDE SERPL-SCNC: 101 MMOL/L (ref 98–107)
CHLORIDE SERPL-SCNC: 108 MMOL/L (ref 98–107)
CO2 SERPL-SCNC: 19 MMOL/L (ref 20–31)
CO2 SERPL-SCNC: 20 MMOL/L (ref 20–31)
CREAT SERPL-MCNC: 0.64 MG/DL (ref 0.5–0.9)
CREAT SERPL-MCNC: 0.68 MG/DL (ref 0.5–0.9)
EOSINOPHILS RELATIVE PERCENT: 0 % (ref 1–7)
GFR SERPL CREATININE-BSD FRML MDRD: >60 ML/MIN/1.73M2
GFR SERPL CREATININE-BSD FRML MDRD: >60 ML/MIN/1.73M2
GLUCOSE BLD-MCNC: 133 MG/DL (ref 65–105)
GLUCOSE BLD-MCNC: 136 MG/DL (ref 65–105)
GLUCOSE BLD-MCNC: 151 MG/DL (ref 65–105)
GLUCOSE SERPL-MCNC: 128 MG/DL (ref 70–99)
GLUCOSE SERPL-MCNC: 204 MG/DL (ref 70–99)
HCT VFR BLD AUTO: 42.6 % (ref 36.3–47.1)
HGB BLD-MCNC: 14.2 G/DL (ref 11.9–15.1)
IMMATURE GRANULOCYTES: 0 %
INR PPP: 1
LYMPHOCYTES # BLD: 68 % (ref 16–46)
MAGNESIUM SERPL-MCNC: 1.9 MG/DL (ref 1.6–2.6)
MCH RBC QN AUTO: 31 PG (ref 25.2–33.5)
MCHC RBC AUTO-ENTMCNC: 33.3 G/DL (ref 25.2–33.5)
MCV RBC AUTO: 93 FL (ref 82.6–102.9)
MONOCYTES # BLD: 7 % (ref 4–11)
MORPHOLOGY: ABNORMAL
MORPHOLOGY: ABNORMAL
NRBC AUTOMATED: 0 PER 100 WBC
PARTIAL THROMBOPLASTIN TIME: 21.8 SEC (ref 23.9–33.8)
PDW BLD-RTO: 14.6 % (ref 11.8–14.4)
PLATELET # BLD AUTO: 279 K/UL (ref 138–453)
PMV BLD AUTO: 9.5 FL (ref 8.1–13.5)
POTASSIUM SERPL-SCNC: 3.3 MMOL/L (ref 3.7–5.3)
POTASSIUM SERPL-SCNC: 4.1 MMOL/L (ref 3.7–5.3)
PROTHROMBIN TIME: 12.6 SEC (ref 11.5–14.2)
RBC # BLD: 4.58 M/UL (ref 3.95–5.11)
SEG NEUTROPHILS: 25 % (ref 43–77)
SEGMENTED NEUTROPHILS ABSOLUTE COUNT: 4.83 K/UL (ref 1.5–8.1)
SODIUM SERPL-SCNC: 138 MMOL/L (ref 135–144)
SODIUM SERPL-SCNC: 142 MMOL/L (ref 135–144)
TROPONIN I SERPL DL<=0.01 NG/ML-MCNC: 7300 NG/L (ref 0–14)
TROPONIN I SERPL DL<=0.01 NG/ML-MCNC: 9 NG/L (ref 0–14)
WBC # BLD AUTO: 19.3 K/UL (ref 3.5–11.3)

## 2023-04-23 PROCEDURE — 85347 COAGULATION TIME ACTIVATED: CPT

## 2023-04-23 PROCEDURE — 84484 ASSAY OF TROPONIN QUANT: CPT

## 2023-04-23 PROCEDURE — B2111ZZ FLUOROSCOPY OF MULTIPLE CORONARY ARTERIES USING LOW OSMOLAR CONTRAST: ICD-10-PCS | Performed by: INTERNAL MEDICINE

## 2023-04-23 PROCEDURE — 85730 THROMBOPLASTIN TIME PARTIAL: CPT

## 2023-04-23 PROCEDURE — 92929 HC PRQ CARD STENT W/ANGIO ADDL: CPT

## 2023-04-23 PROCEDURE — 85025 COMPLETE CBC W/AUTO DIFF WBC: CPT

## 2023-04-23 PROCEDURE — 71045 X-RAY EXAM CHEST 1 VIEW: CPT

## 2023-04-23 PROCEDURE — 6360000002 HC RX W HCPCS

## 2023-04-23 PROCEDURE — 99285 EMERGENCY DEPT VISIT HI MDM: CPT

## 2023-04-23 PROCEDURE — 36415 COLL VENOUS BLD VENIPUNCTURE: CPT

## 2023-04-23 PROCEDURE — 92941 PRQ TRLML REVSC TOT OCCL AMI: CPT

## 2023-04-23 PROCEDURE — C1769 GUIDE WIRE: HCPCS

## 2023-04-23 PROCEDURE — 6370000000 HC RX 637 (ALT 250 FOR IP): Performed by: INTERNAL MEDICINE

## 2023-04-23 PROCEDURE — 2709999900 HC NON-CHARGEABLE SUPPLY

## 2023-04-23 PROCEDURE — 2580000003 HC RX 258: Performed by: STUDENT IN AN ORGANIZED HEALTH CARE EDUCATION/TRAINING PROGRAM

## 2023-04-23 PROCEDURE — 2000000000 HC ICU R&B

## 2023-04-23 PROCEDURE — C1874 STENT, COATED/COV W/DEL SYS: HCPCS

## 2023-04-23 PROCEDURE — 2580000003 HC RX 258: Performed by: EMERGENCY MEDICINE

## 2023-04-23 PROCEDURE — 96376 TX/PRO/DX INJ SAME DRUG ADON: CPT

## 2023-04-23 PROCEDURE — 80048 BASIC METABOLIC PNL TOTAL CA: CPT

## 2023-04-23 PROCEDURE — 96375 TX/PRO/DX INJ NEW DRUG ADDON: CPT

## 2023-04-23 PROCEDURE — 2720000010 HC SURG SUPPLY STERILE

## 2023-04-23 PROCEDURE — 96374 THER/PROPH/DIAG INJ IV PUSH: CPT

## 2023-04-23 PROCEDURE — 93005 ELECTROCARDIOGRAM TRACING: CPT | Performed by: STUDENT IN AN ORGANIZED HEALTH CARE EDUCATION/TRAINING PROGRAM

## 2023-04-23 PROCEDURE — 82947 ASSAY GLUCOSE BLOOD QUANT: CPT

## 2023-04-23 PROCEDURE — 6370000000 HC RX 637 (ALT 250 FOR IP): Performed by: STUDENT IN AN ORGANIZED HEALTH CARE EDUCATION/TRAINING PROGRAM

## 2023-04-23 PROCEDURE — C1725 CATH, TRANSLUMIN NON-LASER: HCPCS

## 2023-04-23 PROCEDURE — C1887 CATHETER, GUIDING: HCPCS

## 2023-04-23 PROCEDURE — C1757 CATH, THROMBECTOMY/EMBOLECT: HCPCS

## 2023-04-23 PROCEDURE — 027034Z DILATION OF CORONARY ARTERY, ONE ARTERY WITH DRUG-ELUTING INTRALUMINAL DEVICE, PERCUTANEOUS APPROACH: ICD-10-PCS | Performed by: INTERNAL MEDICINE

## 2023-04-23 PROCEDURE — 99221 1ST HOSP IP/OBS SF/LOW 40: CPT | Performed by: INTERNAL MEDICINE

## 2023-04-23 PROCEDURE — 93005 ELECTROCARDIOGRAM TRACING: CPT | Performed by: EMERGENCY MEDICINE

## 2023-04-23 PROCEDURE — 93454 CORONARY ARTERY ANGIO S&I: CPT

## 2023-04-23 PROCEDURE — C1894 INTRO/SHEATH, NON-LASER: HCPCS

## 2023-04-23 PROCEDURE — 027137Z DILATION OF CORONARY ARTERY, TWO ARTERIES WITH FOUR OR MORE DRUG-ELUTING INTRALUMINAL DEVICES, PERCUTANEOUS APPROACH: ICD-10-PCS | Performed by: INTERNAL MEDICINE

## 2023-04-23 PROCEDURE — 6360000002 HC RX W HCPCS: Performed by: STUDENT IN AN ORGANIZED HEALTH CARE EDUCATION/TRAINING PROGRAM

## 2023-04-23 PROCEDURE — 85610 PROTHROMBIN TIME: CPT

## 2023-04-23 PROCEDURE — C1892 INTRO/SHEATH,FIXED,PEEL-AWAY: HCPCS

## 2023-04-23 PROCEDURE — 6360000002 HC RX W HCPCS: Performed by: EMERGENCY MEDICINE

## 2023-04-23 PROCEDURE — 83735 ASSAY OF MAGNESIUM: CPT

## 2023-04-23 RX ORDER — M-VIT,TX,IRON,MINS/CALC/FOLIC 27MG-0.4MG
1 TABLET ORAL DAILY
Status: DISCONTINUED | OUTPATIENT
Start: 2023-04-23 | End: 2023-04-25 | Stop reason: HOSPADM

## 2023-04-23 RX ORDER — PANTOPRAZOLE SODIUM 40 MG/1
40 TABLET, DELAYED RELEASE ORAL
Status: DISCONTINUED | OUTPATIENT
Start: 2023-04-24 | End: 2023-04-25 | Stop reason: HOSPADM

## 2023-04-23 RX ORDER — HEPARIN SODIUM 1000 [USP'U]/ML
2000 INJECTION, SOLUTION INTRAVENOUS; SUBCUTANEOUS PRN
Status: DISCONTINUED | OUTPATIENT
Start: 2023-04-23 | End: 2023-04-23 | Stop reason: HOSPADM

## 2023-04-23 RX ORDER — SODIUM CHLORIDE 9 MG/ML
INJECTION, SOLUTION INTRAVENOUS PRN
Status: DISCONTINUED | OUTPATIENT
Start: 2023-04-23 | End: 2023-04-25 | Stop reason: HOSPADM

## 2023-04-23 RX ORDER — ONDANSETRON 2 MG/ML
4 INJECTION INTRAMUSCULAR; INTRAVENOUS ONCE
Status: COMPLETED | OUTPATIENT
Start: 2023-04-23 | End: 2023-04-23

## 2023-04-23 RX ORDER — ONDANSETRON 2 MG/ML
INJECTION INTRAMUSCULAR; INTRAVENOUS
Status: COMPLETED
Start: 2023-04-23 | End: 2023-04-23

## 2023-04-23 RX ORDER — SODIUM CHLORIDE 0.9 % (FLUSH) 0.9 %
5-40 SYRINGE (ML) INJECTION PRN
Status: DISCONTINUED | OUTPATIENT
Start: 2023-04-23 | End: 2023-04-25 | Stop reason: HOSPADM

## 2023-04-23 RX ORDER — HEPARIN SODIUM 10000 [USP'U]/100ML
5-30 INJECTION, SOLUTION INTRAVENOUS CONTINUOUS
Status: DISCONTINUED | OUTPATIENT
Start: 2023-04-23 | End: 2023-04-23 | Stop reason: HOSPADM

## 2023-04-23 RX ORDER — HEPARIN SODIUM 1000 [USP'U]/ML
60 INJECTION, SOLUTION INTRAVENOUS; SUBCUTANEOUS ONCE
Status: COMPLETED | OUTPATIENT
Start: 2023-04-23 | End: 2023-04-23

## 2023-04-23 RX ORDER — ACETAMINOPHEN 325 MG/1
650 TABLET ORAL EVERY 4 HOURS PRN
Status: DISCONTINUED | OUTPATIENT
Start: 2023-04-23 | End: 2023-04-25 | Stop reason: HOSPADM

## 2023-04-23 RX ORDER — SODIUM CHLORIDE 0.9 % (FLUSH) 0.9 %
5-40 SYRINGE (ML) INJECTION EVERY 12 HOURS SCHEDULED
Status: DISCONTINUED | OUTPATIENT
Start: 2023-04-23 | End: 2023-04-25 | Stop reason: HOSPADM

## 2023-04-23 RX ORDER — 0.9 % SODIUM CHLORIDE 0.9 %
1000 INTRAVENOUS SOLUTION INTRAVENOUS ONCE
Status: COMPLETED | OUTPATIENT
Start: 2023-04-23 | End: 2023-04-23

## 2023-04-23 RX ORDER — VITAMIN B COMPLEX
1000 TABLET ORAL DAILY
Status: DISCONTINUED | OUTPATIENT
Start: 2023-04-23 | End: 2023-04-25 | Stop reason: HOSPADM

## 2023-04-23 RX ORDER — MORPHINE SULFATE 2 MG/ML
2 INJECTION, SOLUTION INTRAMUSCULAR; INTRAVENOUS ONCE
Status: COMPLETED | OUTPATIENT
Start: 2023-04-23 | End: 2023-04-23

## 2023-04-23 RX ORDER — GABAPENTIN 300 MG/1
300 CAPSULE ORAL 3 TIMES DAILY
Status: DISCONTINUED | OUTPATIENT
Start: 2023-04-23 | End: 2023-04-25 | Stop reason: HOSPADM

## 2023-04-23 RX ORDER — ALBUTEROL SULFATE 90 UG/1
2 AEROSOL, METERED RESPIRATORY (INHALATION) EVERY 4 HOURS PRN
Status: DISCONTINUED | OUTPATIENT
Start: 2023-04-23 | End: 2023-04-25 | Stop reason: HOSPADM

## 2023-04-23 RX ORDER — HEPARIN SODIUM 1000 [USP'U]/ML
4000 INJECTION, SOLUTION INTRAVENOUS; SUBCUTANEOUS PRN
Status: DISCONTINUED | OUTPATIENT
Start: 2023-04-23 | End: 2023-04-23 | Stop reason: HOSPADM

## 2023-04-23 RX ORDER — ONDANSETRON 2 MG/ML
4 INJECTION INTRAMUSCULAR; INTRAVENOUS EVERY 6 HOURS PRN
Status: DISCONTINUED | OUTPATIENT
Start: 2023-04-23 | End: 2023-04-25 | Stop reason: HOSPADM

## 2023-04-23 RX ORDER — INSULIN LISPRO 100 [IU]/ML
0-8 INJECTION, SOLUTION INTRAVENOUS; SUBCUTANEOUS
Status: DISCONTINUED | OUTPATIENT
Start: 2023-04-23 | End: 2023-04-25 | Stop reason: HOSPADM

## 2023-04-23 RX ORDER — ATORVASTATIN CALCIUM 40 MG/1
40 TABLET, FILM COATED ORAL DAILY
Status: DISCONTINUED | OUTPATIENT
Start: 2023-04-23 | End: 2023-04-25 | Stop reason: HOSPADM

## 2023-04-23 RX ORDER — OXYCODONE HYDROCHLORIDE 5 MG/1
5 TABLET ORAL EVERY 6 HOURS PRN
Status: DISCONTINUED | OUTPATIENT
Start: 2023-04-23 | End: 2023-04-25 | Stop reason: HOSPADM

## 2023-04-23 RX ORDER — INSULIN LISPRO 100 [IU]/ML
0-4 INJECTION, SOLUTION INTRAVENOUS; SUBCUTANEOUS NIGHTLY
Status: DISCONTINUED | OUTPATIENT
Start: 2023-04-23 | End: 2023-04-25 | Stop reason: HOSPADM

## 2023-04-23 RX ORDER — EPTIFIBATIDE 0.75 MG/ML
2 INJECTION, SOLUTION INTRAVENOUS CONTINUOUS
Status: DISPENSED | OUTPATIENT
Start: 2023-04-23 | End: 2023-04-23

## 2023-04-23 RX ORDER — ASPIRIN 81 MG/1
81 TABLET, CHEWABLE ORAL DAILY
Status: DISCONTINUED | OUTPATIENT
Start: 2023-04-24 | End: 2023-04-25 | Stop reason: HOSPADM

## 2023-04-23 RX ADMIN — MORPHINE SULFATE 2 MG: 2 INJECTION, SOLUTION INTRAMUSCULAR; INTRAVENOUS at 09:18

## 2023-04-23 RX ADMIN — Medication 1 TABLET: at 17:19

## 2023-04-23 RX ADMIN — MORPHINE SULFATE 2 MG: 2 INJECTION, SOLUTION INTRAMUSCULAR; INTRAVENOUS at 09:42

## 2023-04-23 RX ADMIN — EPTIFIBATIDE 2 MCG/KG/MIN: 0.75 INJECTION INTRAVENOUS at 14:06

## 2023-04-23 RX ADMIN — Medication 1000 UNITS: at 17:20

## 2023-04-23 RX ADMIN — GABAPENTIN 300 MG: 300 CAPSULE ORAL at 17:20

## 2023-04-23 RX ADMIN — GABAPENTIN 300 MG: 300 CAPSULE ORAL at 21:38

## 2023-04-23 RX ADMIN — TICAGRELOR 90 MG: 90 TABLET ORAL at 21:38

## 2023-04-23 RX ADMIN — OXYCODONE HYDROCHLORIDE 5 MG: 5 TABLET ORAL at 17:20

## 2023-04-23 RX ADMIN — ONDANSETRON 4 MG: 2 INJECTION INTRAMUSCULAR; INTRAVENOUS at 09:39

## 2023-04-23 RX ADMIN — ONDANSETRON 4 MG: 2 INJECTION INTRAMUSCULAR; INTRAVENOUS at 09:18

## 2023-04-23 RX ADMIN — ONDANSETRON 4 MG: 2 INJECTION INTRAMUSCULAR; INTRAVENOUS at 16:04

## 2023-04-23 RX ADMIN — SODIUM CHLORIDE, PRESERVATIVE FREE 10 ML: 5 INJECTION INTRAVENOUS at 13:18

## 2023-04-23 RX ADMIN — EPTIFIBATIDE 2 MCG/KG/MIN: 0.75 INJECTION INTRAVENOUS at 17:22

## 2023-04-23 RX ADMIN — HEPARIN SODIUM AND DEXTROSE 12 UNITS/KG/HR: 10000; 5 INJECTION INTRAVENOUS at 09:36

## 2023-04-23 RX ADMIN — SODIUM CHLORIDE 1000 ML: 9 INJECTION, SOLUTION INTRAVENOUS at 09:18

## 2023-04-23 RX ADMIN — HEPARIN SODIUM 4200 UNITS: 1000 INJECTION INTRAVENOUS; SUBCUTANEOUS at 09:29

## 2023-04-23 RX ADMIN — SODIUM CHLORIDE, PRESERVATIVE FREE 10 ML: 5 INJECTION INTRAVENOUS at 21:38

## 2023-04-23 RX ADMIN — ATORVASTATIN CALCIUM 40 MG: 40 TABLET, FILM COATED ORAL at 17:20

## 2023-04-23 RX ADMIN — ACETAMINOPHEN 650 MG: 325 TABLET ORAL at 13:26

## 2023-04-23 RX ADMIN — ONDANSETRON 4 MG: 2 INJECTION INTRAMUSCULAR; INTRAVENOUS at 22:08

## 2023-04-23 ASSESSMENT — ENCOUNTER SYMPTOMS
COUGH: 0
EYE PAIN: 0
ABDOMINAL PAIN: 0
DIARRHEA: 0
BACK PAIN: 0
VOMITING: 0
SHORTNESS OF BREATH: 0
BLOOD IN STOOL: 0
CONSTIPATION: 0
NAUSEA: 1

## 2023-04-23 ASSESSMENT — PAIN SCALES - GENERAL
PAINLEVEL_OUTOF10: 10
PAINLEVEL_OUTOF10: 7
PAINLEVEL_OUTOF10: 3
PAINLEVEL_OUTOF10: 10

## 2023-04-23 ASSESSMENT — PAIN - FUNCTIONAL ASSESSMENT: PAIN_FUNCTIONAL_ASSESSMENT: 0-10

## 2023-04-23 ASSESSMENT — PAIN DESCRIPTION - DESCRIPTORS: DESCRIPTORS: ACHING

## 2023-04-23 ASSESSMENT — PAIN DESCRIPTION - LOCATION
LOCATION: BACK
LOCATION: CHEST

## 2023-04-23 ASSESSMENT — PAIN DESCRIPTION - ORIENTATION: ORIENTATION: LOWER

## 2023-04-24 LAB
ABSOLUTE EOS #: 0.05 K/UL (ref 0–0.44)
ABSOLUTE IMMATURE GRANULOCYTE: 0.03 K/UL (ref 0–0.3)
ABSOLUTE LYMPH #: 4.65 K/UL (ref 1.1–3.7)
ABSOLUTE MONO #: 0.96 K/UL (ref 0.1–1.2)
ANION GAP SERPL CALCULATED.3IONS-SCNC: 11 MMOL/L (ref 9–17)
BASOPHILS # BLD: 1 % (ref 0–2)
BASOPHILS ABSOLUTE: 0.05 K/UL (ref 0–0.2)
BUN SERPL-MCNC: 16 MG/DL (ref 6–20)
CALCIUM SERPL-MCNC: 9.3 MG/DL (ref 8.6–10.4)
CHLORIDE SERPL-SCNC: 107 MMOL/L (ref 98–107)
CO2 SERPL-SCNC: 21 MMOL/L (ref 20–31)
CREAT SERPL-MCNC: 0.6 MG/DL (ref 0.5–0.9)
EKG ATRIAL RATE: 60 BPM
EKG ATRIAL RATE: 70 BPM
EKG ATRIAL RATE: 87 BPM
EKG P AXIS: 62 DEGREES
EKG P AXIS: 67 DEGREES
EKG P AXIS: 78 DEGREES
EKG P-R INTERVAL: 128 MS
EKG P-R INTERVAL: 130 MS
EKG P-R INTERVAL: 144 MS
EKG Q-T INTERVAL: 382 MS
EKG Q-T INTERVAL: 412 MS
EKG Q-T INTERVAL: 434 MS
EKG QRS DURATION: 102 MS
EKG QRS DURATION: 88 MS
EKG QRS DURATION: 94 MS
EKG QTC CALCULATION (BAZETT): 434 MS
EKG QTC CALCULATION (BAZETT): 444 MS
EKG QTC CALCULATION (BAZETT): 459 MS
EKG R AXIS: 49 DEGREES
EKG R AXIS: 76 DEGREES
EKG R AXIS: 90 DEGREES
EKG T AXIS: -33 DEGREES
EKG T AXIS: 13 DEGREES
EKG T AXIS: 92 DEGREES
EKG VENTRICULAR RATE: 60 BPM
EKG VENTRICULAR RATE: 70 BPM
EKG VENTRICULAR RATE: 87 BPM
EOSINOPHILS RELATIVE PERCENT: 1 % (ref 1–4)
GFR SERPL CREATININE-BSD FRML MDRD: >60 ML/MIN/1.73M2
GLUCOSE BLD-MCNC: 119 MG/DL (ref 65–105)
GLUCOSE BLD-MCNC: 129 MG/DL (ref 65–105)
GLUCOSE BLD-MCNC: 133 MG/DL (ref 65–105)
GLUCOSE BLD-MCNC: 155 MG/DL (ref 65–105)
GLUCOSE SERPL-MCNC: 150 MG/DL (ref 70–99)
HCT VFR BLD AUTO: 38.7 % (ref 36.3–47.1)
HGB BLD-MCNC: 12.6 G/DL (ref 11.9–15.1)
IMMATURE GRANULOCYTES: 0 %
LYMPHOCYTES # BLD: 48 % (ref 24–43)
MAGNESIUM SERPL-MCNC: 2 MG/DL (ref 1.6–2.6)
MCH RBC QN AUTO: 30.7 PG (ref 25.2–33.5)
MCHC RBC AUTO-ENTMCNC: 32.6 G/DL (ref 28.4–34.8)
MCV RBC AUTO: 94.4 FL (ref 82.6–102.9)
MONOCYTES # BLD: 10 % (ref 3–12)
NRBC AUTOMATED: 0 PER 100 WBC
PDW BLD-RTO: 14.7 % (ref 11.8–14.4)
PLATELET # BLD AUTO: 244 K/UL (ref 138–453)
PMV BLD AUTO: 10.1 FL (ref 8.1–13.5)
POTASSIUM SERPL-SCNC: 3.9 MMOL/L (ref 3.7–5.3)
RBC # BLD: 4.1 M/UL (ref 3.95–5.11)
RBC # BLD: ABNORMAL 10*6/UL
SEG NEUTROPHILS: 40 % (ref 36–65)
SEGMENTED NEUTROPHILS ABSOLUTE COUNT: 3.83 K/UL (ref 1.5–8.1)
SODIUM SERPL-SCNC: 139 MMOL/L (ref 135–144)
TROPONIN I SERPL DL<=0.01 NG/ML-MCNC: 6018 NG/L (ref 0–14)
WBC # BLD AUTO: 9.6 K/UL (ref 3.5–11.3)

## 2023-04-24 PROCEDURE — 93010 ELECTROCARDIOGRAM REPORT: CPT | Performed by: INTERNAL MEDICINE

## 2023-04-24 PROCEDURE — 80048 BASIC METABOLIC PNL TOTAL CA: CPT

## 2023-04-24 PROCEDURE — 2000000000 HC ICU R&B

## 2023-04-24 PROCEDURE — 6370000000 HC RX 637 (ALT 250 FOR IP): Performed by: INTERNAL MEDICINE

## 2023-04-24 PROCEDURE — 84484 ASSAY OF TROPONIN QUANT: CPT

## 2023-04-24 PROCEDURE — 82947 ASSAY GLUCOSE BLOOD QUANT: CPT

## 2023-04-24 PROCEDURE — 6360000002 HC RX W HCPCS: Performed by: STUDENT IN AN ORGANIZED HEALTH CARE EDUCATION/TRAINING PROGRAM

## 2023-04-24 PROCEDURE — 36415 COLL VENOUS BLD VENIPUNCTURE: CPT

## 2023-04-24 PROCEDURE — 85025 COMPLETE CBC W/AUTO DIFF WBC: CPT

## 2023-04-24 PROCEDURE — 83735 ASSAY OF MAGNESIUM: CPT

## 2023-04-24 PROCEDURE — 6370000000 HC RX 637 (ALT 250 FOR IP): Performed by: STUDENT IN AN ORGANIZED HEALTH CARE EDUCATION/TRAINING PROGRAM

## 2023-04-24 PROCEDURE — 2580000003 HC RX 258: Performed by: STUDENT IN AN ORGANIZED HEALTH CARE EDUCATION/TRAINING PROGRAM

## 2023-04-24 PROCEDURE — 93005 ELECTROCARDIOGRAM TRACING: CPT | Performed by: EMERGENCY MEDICINE

## 2023-04-24 RX ORDER — NICOTINE 21 MG/24HR
1 PATCH, TRANSDERMAL 24 HOURS TRANSDERMAL DAILY
Status: DISCONTINUED | OUTPATIENT
Start: 2023-04-24 | End: 2023-04-25 | Stop reason: HOSPADM

## 2023-04-24 RX ADMIN — GABAPENTIN 300 MG: 300 CAPSULE ORAL at 20:44

## 2023-04-24 RX ADMIN — METOPROLOL TARTRATE 25 MG: 25 TABLET, FILM COATED ORAL at 14:14

## 2023-04-24 RX ADMIN — ONDANSETRON 4 MG: 2 INJECTION INTRAMUSCULAR; INTRAVENOUS at 04:25

## 2023-04-24 RX ADMIN — GABAPENTIN 300 MG: 300 CAPSULE ORAL at 09:05

## 2023-04-24 RX ADMIN — PAROXETINE HYDROCHLORIDE HEMIHYDRATE 50 MG: 20 TABLET, FILM COATED ORAL at 09:03

## 2023-04-24 RX ADMIN — ONDANSETRON 4 MG: 2 INJECTION INTRAMUSCULAR; INTRAVENOUS at 19:25

## 2023-04-24 RX ADMIN — ATORVASTATIN CALCIUM 40 MG: 40 TABLET, FILM COATED ORAL at 09:03

## 2023-04-24 RX ADMIN — ACETAMINOPHEN 650 MG: 325 TABLET ORAL at 18:53

## 2023-04-24 RX ADMIN — PANTOPRAZOLE SODIUM 40 MG: 40 TABLET, DELAYED RELEASE ORAL at 08:58

## 2023-04-24 RX ADMIN — SODIUM CHLORIDE, PRESERVATIVE FREE 20 ML: 5 INJECTION INTRAVENOUS at 09:06

## 2023-04-24 RX ADMIN — ASPIRIN 81 MG 81 MG: 81 TABLET ORAL at 09:04

## 2023-04-24 RX ADMIN — ACETAMINOPHEN 650 MG: 325 TABLET ORAL at 09:03

## 2023-04-24 RX ADMIN — TICAGRELOR 90 MG: 90 TABLET ORAL at 20:45

## 2023-04-24 RX ADMIN — GABAPENTIN 300 MG: 300 CAPSULE ORAL at 14:14

## 2023-04-24 RX ADMIN — Medication 1 TABLET: at 09:04

## 2023-04-24 RX ADMIN — METOPROLOL TARTRATE 25 MG: 25 TABLET, FILM COATED ORAL at 20:44

## 2023-04-24 RX ADMIN — TICAGRELOR 90 MG: 90 TABLET ORAL at 09:48

## 2023-04-24 RX ADMIN — SODIUM CHLORIDE, PRESERVATIVE FREE 10 ML: 5 INJECTION INTRAVENOUS at 20:45

## 2023-04-24 RX ADMIN — Medication 1000 UNITS: at 09:05

## 2023-04-24 ASSESSMENT — PAIN SCALES - GENERAL
PAINLEVEL_OUTOF10: 5
PAINLEVEL_OUTOF10: 1
PAINLEVEL_OUTOF10: 5
PAINLEVEL_OUTOF10: 0

## 2023-04-24 ASSESSMENT — PAIN DESCRIPTION - LOCATION
LOCATION: HEAD
LOCATION: HEAD

## 2023-04-24 NOTE — CARE COORDINATION
Case Management Assessment  Initial Evaluation    Date/Time of Evaluation: 4/24/2023 12:22 PM  Assessment Completed by: Obdulia Shah RN    If patient is discharged prior to next notation, then this note serves as note for discharge by case management. Patient Name: Crys Perry                   YOB: 1965  Diagnosis: STEMI                   Date / Time: 4/23/2023 10:11 AM    Patient Admission Status: Inpatient   Readmission Risk (Low < 19, Mod (19-27), High > 27): Readmission Risk Score: 4.9    Current PCP: Eric Galvan MD  PCP verified by CM? (P) Yes (Dr Celsa Farrell)    Chart Reviewed: Yes      History Provided by: Patient  Patient Orientation: Alert and Oriented    Patient Cognition: Alert    Hospitalization in the last 30 days (Readmission):  No    If yes, Readmission Assessment in  Navigator will be completed.     Advance Directives:      Code Status: Full Code   Patient's Primary Decision Maker is: Legal Next of Kin      Discharge Planning:    Patient lives with: (P) Spouse/Significant Other Type of Home: (P) House  Primary Care Giver: Self  Patient Support Systems include: Spouse/Significant Other, Children, Family Members   Current Financial resources:    Current community resources:    Current services prior to admission: (P) None            Current DME:              Type of Home Care services:  (P) None    ADLS  Prior functional level: (P) Independent in ADLs/IADLs  Current functional level: (P) Independent in ADLs/IADLs    PT AM-PAC:   /24  OT AM-PAC:   /24    Family can provide assistance at DC: (P) Yes  Would you like Case Management to discuss the discharge plan with any other family members/significant others, and if so, who? (P) No  Plans to Return to Present Housing: (P) Yes  Other Identified Issues/Barriers to RETURNING to current housing:    Potential Assistance needed at discharge: (P) N/A            Potential DME:    Patient expects to discharge to: (P) [address] [place] [address]

## 2023-04-24 NOTE — PLAN OF CARE
Problem: Pain  Goal: Verbalizes/displays adequate comfort level or baseline comfort level  Outcome: Not Progressing     Problem: Safety - Adult  Goal: Free from fall injury  Outcome: Not Progressing

## 2023-04-25 VITALS
WEIGHT: 158.95 LBS | HEART RATE: 79 BPM | TEMPERATURE: 98.5 F | OXYGEN SATURATION: 99 % | SYSTOLIC BLOOD PRESSURE: 108 MMHG | DIASTOLIC BLOOD PRESSURE: 49 MMHG | RESPIRATION RATE: 18 BRPM | HEIGHT: 65 IN | BODY MASS INDEX: 26.48 KG/M2

## 2023-04-25 LAB
ABSOLUTE EOS #: 0.08 K/UL (ref 0–0.44)
ABSOLUTE IMMATURE GRANULOCYTE: <0.03 K/UL (ref 0–0.3)
ABSOLUTE LYMPH #: 3.78 K/UL (ref 1.1–3.7)
ABSOLUTE MONO #: 0.79 K/UL (ref 0.1–1.2)
ANION GAP SERPL CALCULATED.3IONS-SCNC: 11 MMOL/L (ref 9–17)
BASOPHILS # BLD: 1 % (ref 0–2)
BASOPHILS ABSOLUTE: 0.06 K/UL (ref 0–0.2)
BUN SERPL-MCNC: 10 MG/DL (ref 6–20)
CALCIUM SERPL-MCNC: 8.9 MG/DL (ref 8.6–10.4)
CHLORIDE SERPL-SCNC: 109 MMOL/L (ref 98–107)
CO2 SERPL-SCNC: 19 MMOL/L (ref 20–31)
CREAT SERPL-MCNC: 0.49 MG/DL (ref 0.5–0.9)
EOSINOPHILS RELATIVE PERCENT: 1 % (ref 1–4)
GFR SERPL CREATININE-BSD FRML MDRD: >60 ML/MIN/1.73M2
GLUCOSE BLD-MCNC: 107 MG/DL (ref 65–105)
GLUCOSE BLD-MCNC: 125 MG/DL (ref 65–105)
GLUCOSE BLD-MCNC: 135 MG/DL (ref 65–105)
GLUCOSE SERPL-MCNC: 116 MG/DL (ref 70–99)
HCT VFR BLD AUTO: 37.9 % (ref 36.3–47.1)
HGB BLD-MCNC: 11.9 G/DL (ref 11.9–15.1)
IMMATURE GRANULOCYTES: 0 %
LV EF: 38 %
LVEF MODALITY: NORMAL
LYMPHOCYTES # BLD: 54 % (ref 24–43)
MCH RBC QN AUTO: 30.5 PG (ref 25.2–33.5)
MCHC RBC AUTO-ENTMCNC: 31.4 G/DL (ref 28.4–34.8)
MCV RBC AUTO: 97.2 FL (ref 82.6–102.9)
MONOCYTES # BLD: 11 % (ref 3–12)
NRBC AUTOMATED: 0 PER 100 WBC
PDW BLD-RTO: 14.8 % (ref 11.8–14.4)
PLATELET # BLD AUTO: 231 K/UL (ref 138–453)
PMV BLD AUTO: 10.2 FL (ref 8.1–13.5)
POTASSIUM SERPL-SCNC: 4 MMOL/L (ref 3.7–5.3)
RBC # BLD: 3.9 M/UL (ref 3.95–5.11)
RBC # BLD: ABNORMAL 10*6/UL
SEG NEUTROPHILS: 33 % (ref 36–65)
SEGMENTED NEUTROPHILS ABSOLUTE COUNT: 2.32 K/UL (ref 1.5–8.1)
SODIUM SERPL-SCNC: 139 MMOL/L (ref 135–144)
TROPONIN I SERPL DL<=0.01 NG/ML-MCNC: 4386 NG/L (ref 0–14)
WBC # BLD AUTO: 7 K/UL (ref 3.5–11.3)

## 2023-04-25 PROCEDURE — 80048 BASIC METABOLIC PNL TOTAL CA: CPT

## 2023-04-25 PROCEDURE — 84484 ASSAY OF TROPONIN QUANT: CPT

## 2023-04-25 PROCEDURE — 85025 COMPLETE CBC W/AUTO DIFF WBC: CPT

## 2023-04-25 PROCEDURE — 82947 ASSAY GLUCOSE BLOOD QUANT: CPT

## 2023-04-25 PROCEDURE — 6370000000 HC RX 637 (ALT 250 FOR IP): Performed by: STUDENT IN AN ORGANIZED HEALTH CARE EDUCATION/TRAINING PROGRAM

## 2023-04-25 PROCEDURE — 36415 COLL VENOUS BLD VENIPUNCTURE: CPT

## 2023-04-25 PROCEDURE — 2580000003 HC RX 258: Performed by: STUDENT IN AN ORGANIZED HEALTH CARE EDUCATION/TRAINING PROGRAM

## 2023-04-25 PROCEDURE — 6370000000 HC RX 637 (ALT 250 FOR IP): Performed by: INTERNAL MEDICINE

## 2023-04-25 PROCEDURE — 93306 TTE W/DOPPLER COMPLETE: CPT

## 2023-04-25 RX ORDER — ATORVASTATIN CALCIUM 40 MG/1
40 TABLET, FILM COATED ORAL DAILY
Qty: 30 TABLET | Refills: 3 | Status: SHIPPED | OUTPATIENT
Start: 2023-04-26 | End: 2023-04-28 | Stop reason: SDUPTHER

## 2023-04-25 RX ORDER — ASPIRIN 81 MG/1
81 TABLET, CHEWABLE ORAL DAILY
Qty: 30 TABLET | Refills: 3 | Status: SHIPPED | OUTPATIENT
Start: 2023-04-26 | End: 2023-04-28 | Stop reason: SDUPTHER

## 2023-04-25 RX ORDER — LOSARTAN POTASSIUM 25 MG/1
12.5 TABLET ORAL DAILY
Qty: 30 TABLET | Refills: 3 | Status: SHIPPED | OUTPATIENT
Start: 2023-04-26 | End: 2023-04-28 | Stop reason: SDUPTHER

## 2023-04-25 RX ORDER — LOSARTAN POTASSIUM 25 MG/1
12.5 TABLET ORAL DAILY
Status: DISCONTINUED | OUTPATIENT
Start: 2023-04-25 | End: 2023-04-25 | Stop reason: HOSPADM

## 2023-04-25 RX ADMIN — SODIUM CHLORIDE, PRESERVATIVE FREE 10 ML: 5 INJECTION INTRAVENOUS at 08:12

## 2023-04-25 RX ADMIN — TICAGRELOR 90 MG: 90 TABLET ORAL at 08:12

## 2023-04-25 RX ADMIN — ASPIRIN 81 MG 81 MG: 81 TABLET ORAL at 08:11

## 2023-04-25 RX ADMIN — PANTOPRAZOLE SODIUM 40 MG: 40 TABLET, DELAYED RELEASE ORAL at 08:27

## 2023-04-25 RX ADMIN — Medication 1000 UNITS: at 08:12

## 2023-04-25 RX ADMIN — ATORVASTATIN CALCIUM 40 MG: 40 TABLET, FILM COATED ORAL at 08:11

## 2023-04-25 RX ADMIN — GABAPENTIN 300 MG: 300 CAPSULE ORAL at 14:16

## 2023-04-25 RX ADMIN — METOPROLOL TARTRATE 25 MG: 25 TABLET, FILM COATED ORAL at 08:11

## 2023-04-25 RX ADMIN — LOSARTAN POTASSIUM 12.5 MG: 25 TABLET, FILM COATED ORAL at 12:35

## 2023-04-25 RX ADMIN — GABAPENTIN 300 MG: 300 CAPSULE ORAL at 08:11

## 2023-04-25 RX ADMIN — Medication 1 TABLET: at 08:12

## 2023-04-25 RX ADMIN — PAROXETINE HYDROCHLORIDE HEMIHYDRATE 50 MG: 20 TABLET, FILM COATED ORAL at 08:12

## 2023-04-25 NOTE — PROGRESS NOTES
CLINICAL PHARMACY NOTE: MEDS TO BEDS    Total # of Prescriptions Filled: 5   The following medications were delivered to the patient:  Aspirin  Atorvastatin  Losartan  Metoprolol  brilinta    Additional Documentation:   $8.74 collected - cash
Discharge instructions reviewed with patient and patients  at this time. Patients IV removed. Patient was supposed to stay till echo was read but since she was promised this morning that she would have left by 3 pm today per cardiology, that she will no longer wait to do so. Meds to beds have been delivered, patient collected own belongings. States understanding of discharge instructions. Ambulatory to private car.
Echo lab called to see if they will be able to make it to bedside to complete echo. From what the patient told writer, if echo not done by 3 pm. Patient will be able to be discharged and complete echo outpatient.  states will touch base with other tech that has patient order.
[facility]   Progress Note                   Date:   4/24/2023  Patient name: Joey Guan  Date of admission:  4/23/2023 10:11 AM  MRN:   [id number]  YOB: 1965  PCP: Jacques Muniz MD    Reason for Admission:      Subjective:       Patient was seen and evaluated at bedside, sleeping in bed, chest pain free and hemodynamically stable. She becomes tachycardic on exertion and had 2 episodes of NSVT overnight. Labs and imaging reviewed. K 3.6, Mg 2. Troponin down trended. Medications:   Scheduled Meds:   ticagrelor  90 mg Oral BID    metoprolol tartrate  25 mg Oral BID    atorvastatin  40 mg Oral Daily    pantoprazole  40 mg Oral QAM AC    sodium chloride flush  5-40 mL IntraVENous 2 times per day    aspirin  81 mg Oral Daily    insulin lispro  0-8 Units SubCUTAneous TID WC    insulin lispro  0-4 Units SubCUTAneous Nightly    PARoxetine  50 mg Oral Daily    therapeutic multivitamin-minerals  1 tablet Oral Daily    Vitamin D  1,000 Units Oral Daily    gabapentin  300 mg Oral TID       Continuous Infusions:   sodium chloride         CBC:   Recent Labs     04/23/23  0913 04/24/23  0225   WBC 19.3* 9.6   HGB 14.2 12.6    244     BMP:    Recent Labs     04/23/23  0913 04/23/23  1300 04/24/23  0225    142 139   K 3.3* 4.1 3.9    108* 107   CO2 19* 20 21   BUN 14 13 16   CREATININE 0.68 0.64 0.60   GLUCOSE 204* 128* 150*     Hepatic: No results for input(s): AST, ALT, ALB, BILITOT, ALKPHOS in the last 72 hours. Troponin: No results for input(s): TROPONINI in the last 72 hours. BNP: No results for input(s): BNP in the last 72 hours. Lipids: No results for input(s): CHOL, HDL in the last 72 hours.     Invalid input(s): LDLCALCU  INR:   Recent Labs     04/23/23 0913   INR 1.0       Objective:   Vitals: BP (!) 124/92   Pulse (!) 103   Temp 99 °F (37.2 °C) (Oral)   Resp 18   Ht 5' 5\" (1.651 m)   Wt 159 lb 2.8 oz (72.2 kg)   LMP 01/18/2018 (Within Months)   SpO2
Per Dr. Milton Sauer, discharge order is in and medication reconciliation and discharge medication orders completed. Writer told that patient may be discharged once the echo results are back. Once it is back check with cardiology to verify patient is still okay to discharge at that time.
appearance: awake, alert, in no apparent respiratory distress   HEENT: Head: Normocephalic, no lesions, without obvious abnormality  Neck: no JVD  Lungs: clear to auscultation bilaterally, no basilar rales, no wheezing   Heart: regular rate and rhythm, S1, S2 normal, no murmur, click, rub or gallop  Abdomen: soft, non-tender; bowel sounds normal  Extremities: No LE edema  Neurologic: Mental status: Alert, oriented. Motor and sensory not done. EKG: ST elevation in inferior leads with reciprocal changes in lateral leads. Echocardiogram: Pending      Coronary Angiography:  Conclusions      Procedure Summary      % proximal stenosis with extensive clot, acute closure, Cuprit   lesion, large dominant RCA. Reduced to 0% using multiple aspiration   thrombectomy using penumbra catheter followed by insertion of 4 mm LEDA   stent. 80% mid stenosis reduced to 0% using 3.5 mm LEDA stent. The RPL had   100% stenosis reduced to 0% using aspiration thrombectomy followed by   insertion of 2x30 mm LEDA Resolute and 2.5 mm LEDA Resolute stent. KRISTAN 3   flow restored to all vessels. Mild disease of other arteries. Recommendations      DAPT and risk factor modifications. Integrilin for 12 hours. Echo. Detailed discussion with patient and family. Assessment / Acute Cardiac Problems:   Inferior STEMI s/p aspiration thrombectomy and PCI of the RPL and RCA   EF eval pending on 2D  ECHO   HTN  HLD  H/O rheumatoid arthritis   Smoker    Patient Active Problem List:     Gastroesophageal reflux disease without esophagitis     Tobacco abuse     Hyperlipidemia     Rheumatoid arthritis ([address]     Pneumonia due to organism     Chronic right-sided low back pain with right-sided sciatica     Pain of right sacroiliac joint     Piriformis syndrome of right side     STEMI (ST elevation myocardial infarction) ([address]      Plan of Treatment:   Continue ASA and brilinta daily. Continue lipitor 40 mg daily.    Start

## 2023-04-25 NOTE — DISCHARGE INSTRUCTIONS
Please take all medications as prescribed. Check your BP at home. Follow up with PCP and [facility] within 2 weeks.

## 2023-04-25 NOTE — DISCHARGE INSTR - DIET
Good nutrition is important when healing from an illness, injury, or surgery. Follow any nutrition recommendations given to you during your hospital stay. If you were given an oral nutrition supplement while in the hospital, continue to take this supplement at home. You can take it with meals, in-between meals, and/or before bedtime. These supplements can be purchased at most local grocery stores, pharmacies, and chain [employer]-stores. If you have any questions about your diet or nutrition, call the hospital and ask for the dietitian. Cardiac diet as tolerated.

## 2023-04-25 NOTE — PLAN OF CARE
Problem: Pain  Goal: Verbalizes/displays adequate comfort level or baseline comfort level  4/25/2023 0227 by Jacey Moore RN  Outcome: Progressing  Flowsheets (Taken 4/24/2023 2000)  Verbalizes/displays adequate comfort level or baseline comfort level:   Encourage patient to monitor pain and request assistance   Assess pain using appropriate pain scale   Administer analgesics based on type and severity of pain and evaluate response   Implement non-pharmacological measures as appropriate and evaluate response   Notify Licensed Independent Practitioner if interventions unsuccessful or patient reports new pain  4/24/2023 1618 by Padmini Moe RN  Outcome: Not Progressing     Problem: Safety - Adult  Goal: Free from fall injury  4/25/2023 0227 by Jacey Moore RN  Outcome: Progressing  Flowsheets (Taken 4/24/2023 2000)  Free From Fall Injury:   Instruct family/caregiver on patient safety   Based on caregiver fall risk screen, instruct family/caregiver to ask for assistance with transferring infant if caregiver noted to have fall risk factors  4/24/2023 1618 by Padmini Moe RN  Outcome: Not Progressing

## 2023-04-25 NOTE — PLAN OF CARE
Problem: Discharge Planning  Goal: Discharge to home or other facility with appropriate resources  4/25/2023 1442 by Lake Pizarro RN  Outcome: Progressing     Problem: Pain  Goal: Verbalizes/displays adequate comfort level or baseline comfort level  4/25/2023 1442 by Lake Pizarro RN  Outcome: Progressing     Problem: Safety - Adult  Goal: Free from fall injury  4/25/2023 1442 by Lake Pizarro RN  Outcome: Progressing

## 2023-04-26 ENCOUNTER — TELEPHONE (OUTPATIENT)
Dept: FAMILY MEDICINE CLINIC | Age: 58
End: 2023-04-26

## 2023-04-26 ENCOUNTER — CARE COORDINATION (OUTPATIENT)
Dept: CASE MANAGEMENT | Age: 58
End: 2023-04-26

## 2023-04-26 DIAGNOSIS — I21.3 ST ELEVATION MYOCARDIAL INFARCTION (STEMI), UNSPECIFIED ARTERY (HCC): Primary | ICD-10-CM

## 2023-04-27 NOTE — DISCHARGE SUMMARY
[facility]  Discharge Note                 Name:  Yara Fall  YOB: 1965  Social Security Number:  [id number]  Medical Record Number:  [id number]    Date of Admission:  4/23/2023  Date of Discharge:  4/25/23    Admitting physician: Dexter Mcgovern MD    Discharge Attending: Dr. Toño Rae  Primary Care Physician: Domi Avila MD  Consultants: None  Discharge to [address] [place] [address] LIST:  Patient Active Problem List   Diagnosis    Gastroesophageal reflux disease without esophagitis    Tobacco abuse    Hyperlipidemia    Rheumatoid arthritis ([facility] [address]    Pneumonia due to organism    Chronic right-sided low back pain with right-sided sciatica    Pain of right sacroiliac joint    Piriformis syndrome of right side    STEMI (ST elevation myocardial infarction) [facility])       Procedures:  cardiac catheterization, PCI and aspiration thrombectomy    HOSPITAL COURSE :   The patient was admitted for: Inferior STEMI  Hospital Procedures if any: Cardiac cath, thrombectomy and PCI of the RCA   Medications changes recommendation: As per discharge list   Follow Up Plan: 4 weeks as outpatient     Discharge exam:   Vitals:    04/25/23 1745   BP: (!) 108/49   Pulse: 79   Resp:    Temp:    SpO2:      Patient is feeling much better, denies any chest pain,k dyspnea, orthopnea or palpitations. Neuro: normal  Chest: Clear to ausculation. No wheezing. Cardiac: Cor RRR  Abdomen/groin: soft, non-tender, without masses or organomegaly  Lower extremity edema: none     Follow up with primary care provider 1 week  Follow up with cardiology 4 weeks  Follow up with other consultant physicians at their directions.     Discharge Medications:     Medication List        START taking these medications      aspirin 81 MG chewable tablet  Take 1 tablet by mouth daily     losartan 25 MG tablet  Commonly known as: COZAAR  Take 0.5 tablets by mouth daily     metoprolol tartrate 25 MG tablet  Commonly known as:

## 2023-04-28 ENCOUNTER — OFFICE VISIT (OUTPATIENT)
Dept: CARDIOLOGY | Age: 58
End: 2023-04-28
Payer: COMMERCIAL

## 2023-04-28 VITALS
BODY MASS INDEX: 26.49 KG/M2 | WEIGHT: 159 LBS | HEART RATE: 80 BPM | HEIGHT: 65 IN | SYSTOLIC BLOOD PRESSURE: 104 MMHG | DIASTOLIC BLOOD PRESSURE: 58 MMHG

## 2023-04-28 DIAGNOSIS — E78.2 MIXED HYPERLIPIDEMIA: ICD-10-CM

## 2023-04-28 DIAGNOSIS — I50.22 CHRONIC SYSTOLIC (CONGESTIVE) HEART FAILURE (HCC): ICD-10-CM

## 2023-04-28 DIAGNOSIS — F11.20 OPIOID DEPENDENCE WITH CURRENT USE (HCC): ICD-10-CM

## 2023-04-28 DIAGNOSIS — I10 ESSENTIAL HYPERTENSION: ICD-10-CM

## 2023-04-28 DIAGNOSIS — I21.3 ST ELEVATION MYOCARDIAL INFARCTION (STEMI), UNSPECIFIED ARTERY (HCC): Primary | ICD-10-CM

## 2023-04-28 DIAGNOSIS — I25.5 ISCHEMIC CARDIOMYOPATHY: ICD-10-CM

## 2023-04-28 PROCEDURE — 4004F PT TOBACCO SCREEN RCVD TLK: CPT | Performed by: INTERNAL MEDICINE

## 2023-04-28 PROCEDURE — G8427 DOCREV CUR MEDS BY ELIG CLIN: HCPCS | Performed by: INTERNAL MEDICINE

## 2023-04-28 PROCEDURE — 3078F DIAST BP <80 MM HG: CPT | Performed by: INTERNAL MEDICINE

## 2023-04-28 PROCEDURE — 1111F DSCHRG MED/CURRENT MED MERGE: CPT | Performed by: INTERNAL MEDICINE

## 2023-04-28 PROCEDURE — 93000 ELECTROCARDIOGRAM COMPLETE: CPT | Performed by: INTERNAL MEDICINE

## 2023-04-28 PROCEDURE — 3074F SYST BP LT 130 MM HG: CPT | Performed by: INTERNAL MEDICINE

## 2023-04-28 PROCEDURE — 99214 OFFICE O/P EST MOD 30 MIN: CPT | Performed by: INTERNAL MEDICINE

## 2023-04-28 PROCEDURE — G8419 CALC BMI OUT NRM PARAM NOF/U: HCPCS | Performed by: INTERNAL MEDICINE

## 2023-04-28 PROCEDURE — 3017F COLORECTAL CA SCREEN DOC REV: CPT | Performed by: INTERNAL MEDICINE

## 2023-04-28 RX ORDER — ATORVASTATIN CALCIUM 40 MG/1
40 TABLET, FILM COATED ORAL DAILY
Qty: 30 TABLET | Refills: 3 | Status: SHIPPED | OUTPATIENT
Start: 2023-04-28

## 2023-04-28 RX ORDER — ASPIRIN 81 MG/1
81 TABLET, CHEWABLE ORAL DAILY
Qty: 30 TABLET | Refills: 3 | Status: SHIPPED | OUTPATIENT
Start: 2023-04-28

## 2023-04-28 RX ORDER — LOSARTAN POTASSIUM 25 MG/1
12.5 TABLET ORAL DAILY
Qty: 30 TABLET | Refills: 3 | Status: SHIPPED | OUTPATIENT
Start: 2023-04-28

## 2023-04-28 ASSESSMENT — ENCOUNTER SYMPTOMS
EYE DISCHARGE: 0
VOICE CHANGE: 0
EYE ITCHING: 0
CHEST TIGHTNESS: 0
SORE THROAT: 0
APNEA: 0

## 2023-04-28 NOTE — PROGRESS NOTES
Today's Date: 4/28/2023  Patient's Name: Russell Church  Patient's age: 66 y.o., 1965    Subjective: The patient is a 66y.o. year old, , female is in the office for CAD. Denies exertional chest pain or SOB, no dizziness or syncope  and no palpitations. Past Medical History:   has a past medical history of Fibroid uterus, GERD (gastroesophageal reflux disease), Hyperlipidemia, Rheumatoid arthritis ([address] and Tobacco abuse. Past Surgical History:   has a past surgical history that includes laparoscopy; Cholecystectomy; Colonoscopy (04/27/2018); and Back Injection (Right, 9/30/2022). Home Medications:  Prior to Admission medications    Medication Sig Start Date End Date Taking? Authorizing Provider   aspirin 81 MG chewable tablet Take 1 tablet by mouth daily 4/26/23  Yes Justino Victoria MD   atorvastatin (LIPITOR) 40 MG tablet Take 1 tablet by mouth daily 4/26/23  Yes Justino Victoria MD   losartan (COZAAR) 25 MG tablet Take 0.5 tablets by mouth daily 4/26/23  Yes Justino Victoria MD   metoprolol tartrate (LOPRESSOR) 25 MG tablet Take 1 tablet by mouth 2 times daily 4/25/23  Yes Justino Victoria MD   ticagrelor (BRILINTA) 90 MG TABS tablet Take 1 tablet by mouth 2 times daily 4/25/23  Yes Justino Victoria MD   oxyCODONE-acetaminophen (PERCOCET) 5-325 MG per tablet Take 1 tablet by mouth 2 times daily as needed for Pain for up to 30 days.  Max Daily Amount: 2 tablets 4/21/23 5/21/23 Yes Cassy Goncalves MD   PARoxetine (PAXIL) 10 MG tablet 1 po daily 2/20/23  Yes Dami Taylor MD   esomeprazole (NEXIUM) 40 MG delayed release capsule TAKE 1 CAPSULE EVERY       MORNING BEFORE BREAKFAST 2/10/23  Yes Dami Taylor MD   PARoxetine (PAXIL) 40 MG tablet TAKE 1 TABLET DAILY 1/20/23  Yes Dami Taylor MD   Ascorbic Acid (VITAMIN C) 1000 MG tablet 1 tablet Orally Once a day for 30 day(s)   Yes Historical Provider, MD   tiZANidine (ZANAFLEX) 4 MG tablet  11/18/22  Yes Historical Provider, MD

## 2023-05-01 ENCOUNTER — OFFICE VISIT (OUTPATIENT)
Dept: FAMILY MEDICINE CLINIC | Age: 58
End: 2023-05-01
Payer: COMMERCIAL

## 2023-05-01 VITALS
HEART RATE: 79 BPM | OXYGEN SATURATION: 97 % | WEIGHT: 156.6 LBS | SYSTOLIC BLOOD PRESSURE: 110 MMHG | DIASTOLIC BLOOD PRESSURE: 60 MMHG | HEIGHT: 66 IN | BODY MASS INDEX: 25.17 KG/M2

## 2023-05-01 DIAGNOSIS — Z72.0 TOBACCO ABUSE: ICD-10-CM

## 2023-05-01 DIAGNOSIS — I21.11 ST ELEVATION MYOCARDIAL INFARCTION INVOLVING RIGHT CORONARY ARTERY (HCC): Primary | ICD-10-CM

## 2023-05-01 DIAGNOSIS — Z09 HOSPITAL DISCHARGE FOLLOW-UP: ICD-10-CM

## 2023-05-01 DIAGNOSIS — I25.10 CORONARY ARTERY DISEASE INVOLVING NATIVE CORONARY ARTERY OF NATIVE HEART WITHOUT ANGINA PECTORIS: ICD-10-CM

## 2023-05-01 DIAGNOSIS — E78.5 HYPERLIPIDEMIA, UNSPECIFIED HYPERLIPIDEMIA TYPE: ICD-10-CM

## 2023-05-01 PROBLEM — M25.559 ARTHRALGIA OF HIP: Status: ACTIVE | Noted: 2021-12-07

## 2023-05-01 PROBLEM — M06.9 RHEUMATOID ARTHRITIS INVOLVING RIGHT HIP (HCC): Status: ACTIVE | Noted: 2021-12-07

## 2023-05-01 PROCEDURE — G8419 CALC BMI OUT NRM PARAM NOF/U: HCPCS | Performed by: FAMILY MEDICINE

## 2023-05-01 PROCEDURE — 4004F PT TOBACCO SCREEN RCVD TLK: CPT | Performed by: FAMILY MEDICINE

## 2023-05-01 PROCEDURE — 99214 OFFICE O/P EST MOD 30 MIN: CPT | Performed by: FAMILY MEDICINE

## 2023-05-01 PROCEDURE — 1111F DSCHRG MED/CURRENT MED MERGE: CPT | Performed by: FAMILY MEDICINE

## 2023-05-01 PROCEDURE — 3017F COLORECTAL CA SCREEN DOC REV: CPT | Performed by: FAMILY MEDICINE

## 2023-05-01 PROCEDURE — G8427 DOCREV CUR MEDS BY ELIG CLIN: HCPCS | Performed by: FAMILY MEDICINE

## 2023-05-01 SDOH — ECONOMIC STABILITY: HOUSING INSECURITY
IN THE LAST 12 MONTHS, WAS THERE A TIME WHEN YOU DID NOT HAVE A STEADY PLACE TO SLEEP OR SLEPT IN A SHELTER (INCLUDING NOW)?: NO

## 2023-05-01 SDOH — ECONOMIC STABILITY: FOOD INSECURITY: WITHIN THE PAST 12 MONTHS, THE FOOD YOU BOUGHT JUST DIDN'T LAST AND YOU DIDN'T HAVE MONEY TO GET MORE.: NEVER TRUE

## 2023-05-01 SDOH — ECONOMIC STABILITY: INCOME INSECURITY: HOW HARD IS IT FOR YOU TO PAY FOR THE VERY BASICS LIKE FOOD, HOUSING, MEDICAL CARE, AND HEATING?: NOT HARD AT ALL

## 2023-05-01 SDOH — ECONOMIC STABILITY: FOOD INSECURITY: WITHIN THE PAST 12 MONTHS, YOU WORRIED THAT YOUR FOOD WOULD RUN OUT BEFORE YOU GOT MONEY TO BUY MORE.: NEVER TRUE

## 2023-05-01 ASSESSMENT — PATIENT HEALTH QUESTIONNAIRE - PHQ9
2. FEELING DOWN, DEPRESSED OR HOPELESS: 1
SUM OF ALL RESPONSES TO PHQ QUESTIONS 1-9: 1
SUM OF ALL RESPONSES TO PHQ9 QUESTIONS 1 & 2: 1
1. LITTLE INTEREST OR PLEASURE IN DOING THINGS: 0
SUM OF ALL RESPONSES TO PHQ QUESTIONS 1-9: 1

## 2023-05-01 NOTE — PROGRESS NOTES
Post-Discharge Transitional Care  Follow Up      Juanis Concepcion   YOB: 1965    Date of Office Visit:  5/1/2023  Date of Hospital Admission: 4/23/23  Date of Hospital Discharge: 4/25/23  Risk of hospital readmission (high >=14%. Medium >=10%) :Readmission Risk Score: 6.2      Care management risk score Rising risk (score 2-5) and Complex Care (Scores >=6): No Risk Score On File     Non face to face  following discharge, date last encounter closed (first attempt may have been earlier): 04/26/2023    Call initiated 2 business days of discharge: Yes    ASSESSMENT/PLAN:   Encounter Diagnoses   Name Primary? ST elevation myocardial infarction involving right coronary artery ([facility] [address] Yes    Coronary artery disease involving native coronary artery of native heart without angina pectoris     Hyperlipidemia, unspecified hyperlipidemia type     Tobacco abuse      S/p 4 stents to RCA distribution. Doing well since cath. , pain free. Cath site with bruising, no significant clot. Compliant with meds and new doses. Reviewed. Medical Decision Making: moderate complexity  No follow-ups on file. Subjective:   HPI:  Follow up of Hospital problems/diagnosis(es): follow up after recent cad admission with chest pain. Chest pain presentation to ER  with ekg showing STEMI in an inferior distribution. Transferred to [facility]. Had cath with thrombectomy and PCI of RCA. Large proximal lesion with clot. Had total of 4 stents in the distribution of the RCA and branches. Remaining arteries with 10-20% disease. Trying to quit smoking. Offered assistance. New meds reviewed. Cont. Current dosing. Discussed the importance of compliance with brilinta. Inpatient course: Discharge summary reviewed- see chart.     Interval history/Current status: improved /stable      Patient Active Problem List   Diagnosis    Gastroesophageal reflux disease without esophagitis    Tobacco abuse    Hyperlipidemia

## 2023-05-03 ENCOUNTER — CARE COORDINATION (OUTPATIENT)
Dept: CASE MANAGEMENT | Age: 58
End: 2023-05-03

## 2023-05-03 NOTE — CARE COORDINATION
[facility] Care Transitions Follow Up Call      Patient: Mercedes Loo  Patient : 1965   MRN: [id number]  Reason for Admission: chest pain  STEMI  Discharge Date: 23 RARS: Readmission Risk Score: 6.2      Subsequent transitional call. No answer. Left HIPPA compliant message to return call to this writer.     Pt seen by Cardiology on 23  Seen by PCP 23      Follow Up  Future Appointments   Date Time Provider Yu White   2023  1:00 PM [facility]   2023 10:15 AM [facility]       Interventions to address risk factors: Obtained and reviewed discharge summary and/or continuity of care documents      Care Transitions Subsequent and Final Call    Subsequent and Final Calls  Care Transitions Interventions  Other Interventions:               LORIN Méndez LPN Care Transitions Nurse   [phone]

## 2023-05-04 ENCOUNTER — CARE COORDINATION (OUTPATIENT)
Dept: CARE COORDINATION | Age: 58
End: 2023-05-04

## 2023-05-04 NOTE — CARE COORDINATION
plan with patient and discussed any barriers to care and/or understanding of plan of care after discharge. Discussed appropriate site of care based on symptoms and resources available to patient including: PCP  Specialist. The patient agrees to contact the PCP office for questions related to their healthcare. Advance Care Planning:   decision maker updated. Patients top risk factors for readmission: STEMI, Hospital Procedures if any: Cardiac cath, thrombectomy and PCI of the RCA   Interventions to address risk factors: Obtained and reviewed discharge summary and/or continuity of care documents, Communication with specialists who will assume or re-assume care of the patient's system-specific problems-Message left on Cardiology Nurse Line today reagding Patient's complaints of chest heaviness and a little difficulty breathing for the past two nights , and Assessment and support for treatment adherence and medication management-Discharge medications reviewed with Patient    Offered patient enrollment in the Remote Patient Monitoring (RPM) program for in-home monitoring: Patient is not eligible for RPM program, Jessica Fitch.     Care Transitions Subsequent and Final Call    Subsequent and Final Calls  Care Transitions Interventions  Other Interventions:             Care Transition Nurse provided contact information for future needs. Plan for follow-up call in 5-7 days based on severity of symptoms and risk factors. Plan for next call: symptom management-Patient informed Writer that she has experienced chest heaviness and a little difficulty breathing in the middle of the night for the past two nights. Writer left message on Cardiology Nurse Line regarding Patients complaints.   Patient was informed  medication management-Discharge medications reviewed with Patient      Brea Ricks RN

## 2023-05-08 ENCOUNTER — HOSPITAL ENCOUNTER (INPATIENT)
Age: 58
LOS: 1 days | Discharge: HOME | DRG: 282 | End: 2023-05-09
Attending: EMERGENCY MEDICINE | Admitting: INTERNAL MEDICINE
Payer: COMMERCIAL

## 2023-05-08 ENCOUNTER — APPOINTMENT (OUTPATIENT)
Dept: CT IMAGING | Age: 58
End: 2023-05-08
Payer: COMMERCIAL

## 2023-05-08 ENCOUNTER — HOSPITAL ENCOUNTER (EMERGENCY)
Age: 58
Discharge: ANOTHER HOSPITAL | End: 2023-05-08
Attending: STUDENT IN AN ORGANIZED HEALTH CARE EDUCATION/TRAINING PROGRAM
Payer: COMMERCIAL

## 2023-05-08 VITALS
BODY MASS INDEX: 25.07 KG/M2 | TEMPERATURE: 96.6 F | WEIGHT: 156 LBS | OXYGEN SATURATION: 95 % | HEIGHT: 66 IN | HEART RATE: 95 BPM | SYSTOLIC BLOOD PRESSURE: 127 MMHG | RESPIRATION RATE: 15 BRPM | DIASTOLIC BLOOD PRESSURE: 93 MMHG

## 2023-05-08 DIAGNOSIS — I21.4 NSTEMI (NON-ST ELEVATED MYOCARDIAL INFARCTION) (HCC): Primary | ICD-10-CM

## 2023-05-08 DIAGNOSIS — I50.9 ACUTE CONGESTIVE HEART FAILURE, UNSPECIFIED HEART FAILURE TYPE (HCC): ICD-10-CM

## 2023-05-08 DIAGNOSIS — I50.23 ACUTE ON CHRONIC SYSTOLIC CONGESTIVE HEART FAILURE (HCC): ICD-10-CM

## 2023-05-08 PROBLEM — E78.5 HLD (HYPERLIPIDEMIA): Status: ACTIVE | Noted: 2023-05-08

## 2023-05-08 PROBLEM — R07.9 CHEST PAIN: Status: ACTIVE | Noted: 2023-05-08

## 2023-05-08 LAB
ABSOLUTE EOS #: 0.08 K/UL (ref 0–0.44)
ABSOLUTE EOS #: 0.13 K/UL (ref 0–0.44)
ABSOLUTE IMMATURE GRANULOCYTE: <0.03 K/UL (ref 0–0.3)
ABSOLUTE IMMATURE GRANULOCYTE: <0.03 K/UL (ref 0–0.3)
ABSOLUTE LYMPH #: 2.58 K/UL (ref 1.1–3.7)
ABSOLUTE LYMPH #: 3.3 K/UL (ref 1.1–3.7)
ABSOLUTE MONO #: 0.51 K/UL (ref 0.1–1.2)
ABSOLUTE MONO #: 0.7 K/UL (ref 0.1–1.2)
ANION GAP SERPL CALCULATED.3IONS-SCNC: 10 MMOL/L (ref 9–17)
ANION GAP SERPL CALCULATED.3IONS-SCNC: 13 MMOL/L (ref 9–17)
BASOPHILS # BLD: 1 % (ref 0–2)
BASOPHILS # BLD: 1 % (ref 0–2)
BASOPHILS ABSOLUTE: 0.05 K/UL (ref 0–0.2)
BASOPHILS ABSOLUTE: 0.06 K/UL (ref 0–0.2)
BNP SERPL-MCNC: 5620 PG/ML
BUN SERPL-MCNC: 20 MG/DL (ref 6–20)
BUN SERPL-MCNC: 21 MG/DL (ref 6–20)
BUN/CREAT BLD: 29 (ref 9–20)
CALCIUM SERPL-MCNC: 9.1 MG/DL (ref 8.6–10.4)
CALCIUM SERPL-MCNC: 9.1 MG/DL (ref 8.6–10.4)
CHLORIDE SERPL-SCNC: 110 MMOL/L (ref 98–107)
CHLORIDE SERPL-SCNC: 111 MMOL/L (ref 98–107)
CO2 SERPL-SCNC: 17 MMOL/L (ref 20–31)
CO2 SERPL-SCNC: 19 MMOL/L (ref 20–31)
CREAT SERPL-MCNC: 0.49 MG/DL (ref 0.5–0.9)
CREAT SERPL-MCNC: 0.72 MG/DL (ref 0.5–0.9)
EKG ATRIAL RATE: 102 BPM
EKG ATRIAL RATE: 89 BPM
EKG P AXIS: 65 DEGREES
EKG P AXIS: 67 DEGREES
EKG P-R INTERVAL: 158 MS
EKG P-R INTERVAL: 168 MS
EKG Q-T INTERVAL: 380 MS
EKG Q-T INTERVAL: 396 MS
EKG QRS DURATION: 92 MS
EKG QRS DURATION: 94 MS
EKG QTC CALCULATION (BAZETT): 481 MS
EKG QTC CALCULATION (BAZETT): 495 MS
EKG R AXIS: 73 DEGREES
EKG R AXIS: 82 DEGREES
EKG T AXIS: -66 DEGREES
EKG T AXIS: -68 DEGREES
EKG VENTRICULAR RATE: 102 BPM
EKG VENTRICULAR RATE: 89 BPM
EOSINOPHILS RELATIVE PERCENT: 1 % (ref 1–4)
EOSINOPHILS RELATIVE PERCENT: 3 % (ref 1–4)
GFR SERPL CREATININE-BSD FRML MDRD: >60 ML/MIN/1.73M2
GFR SERPL CREATININE-BSD FRML MDRD: >60 ML/MIN/1.73M2
GLUCOSE SERPL-MCNC: 118 MG/DL (ref 70–99)
GLUCOSE SERPL-MCNC: 222 MG/DL (ref 70–99)
HCT VFR BLD AUTO: 36.4 % (ref 36.3–47.1)
HCT VFR BLD AUTO: 37 % (ref 36.3–47.1)
HGB BLD-MCNC: 12.3 G/DL (ref 11.9–15.1)
HGB BLD-MCNC: 12.7 G/DL (ref 11.9–15.1)
IMMATURE GRANULOCYTES: 0 %
IMMATURE GRANULOCYTES: 0 %
LYMPHOCYTES # BLD: 53 % (ref 24–43)
LYMPHOCYTES # BLD: 54 % (ref 24–43)
MCH RBC QN AUTO: 31.1 PG (ref 25.2–33.5)
MCH RBC QN AUTO: 31.5 PG (ref 25.2–33.5)
MCHC RBC AUTO-ENTMCNC: 33.8 G/DL (ref 28.4–34.8)
MCHC RBC AUTO-ENTMCNC: 34.3 G/DL (ref 25.2–33.5)
MCV RBC AUTO: 91.8 FL (ref 82.6–102.9)
MCV RBC AUTO: 92.2 FL (ref 82.6–102.9)
MONOCYTES # BLD: 11 % (ref 3–12)
MONOCYTES # BLD: 11 % (ref 3–12)
NRBC AUTOMATED: 0 PER 100 WBC
NRBC AUTOMATED: 0 PER 100 WBC
PARTIAL THROMBOPLASTIN TIME: 27.2 SEC (ref 23.9–33.8)
PARTIAL THROMBOPLASTIN TIME: 38.5 SEC (ref 23–36.5)
PDW BLD-RTO: 14.1 % (ref 11.8–14.4)
PDW BLD-RTO: 14.2 % (ref 11.8–14.4)
PLATELET # BLD AUTO: 241 K/UL (ref 138–453)
PLATELET # BLD AUTO: 250 K/UL (ref 138–453)
PMV BLD AUTO: 10 FL (ref 8.1–13.5)
PMV BLD AUTO: 10.6 FL (ref 8.1–13.5)
POTASSIUM SERPL-SCNC: 3.7 MMOL/L (ref 3.7–5.3)
POTASSIUM SERPL-SCNC: 4.1 MMOL/L (ref 3.7–5.3)
RBC # BLD: 3.95 M/UL (ref 3.95–5.11)
RBC # BLD: 4.03 M/UL (ref 3.95–5.11)
SARS-COV-2 RDRP RESP QL NAA+PROBE: NOT DETECTED
SEG NEUTROPHILS: 31 % (ref 36–65)
SEG NEUTROPHILS: 34 % (ref 36–65)
SEGMENTED NEUTROPHILS ABSOLUTE COUNT: 1.46 K/UL (ref 1.5–8.1)
SEGMENTED NEUTROPHILS ABSOLUTE COUNT: 2.09 K/UL (ref 1.5–8.1)
SODIUM SERPL-SCNC: 138 MMOL/L (ref 135–144)
SODIUM SERPL-SCNC: 142 MMOL/L (ref 135–144)
SPECIMEN DESCRIPTION: NORMAL
TROPONIN I SERPL DL<=0.01 NG/ML-MCNC: 123 NG/L (ref 0–14)
TROPONIN I SERPL DL<=0.01 NG/ML-MCNC: 170 NG/L (ref 0–14)
TROPONIN I SERPL DL<=0.01 NG/ML-MCNC: 75 NG/L (ref 0–14)
WBC # BLD AUTO: 4.8 K/UL (ref 3.5–11.3)
WBC # BLD AUTO: 6.2 K/UL (ref 3.5–11.3)

## 2023-05-08 PROCEDURE — 84484 ASSAY OF TROPONIN QUANT: CPT

## 2023-05-08 PROCEDURE — 96376 TX/PRO/DX INJ SAME DRUG ADON: CPT

## 2023-05-08 PROCEDURE — 6360000004 HC RX CONTRAST MEDICATION

## 2023-05-08 PROCEDURE — 6370000000 HC RX 637 (ALT 250 FOR IP)

## 2023-05-08 PROCEDURE — 85730 THROMBOPLASTIN TIME PARTIAL: CPT

## 2023-05-08 PROCEDURE — 80048 BASIC METABOLIC PNL TOTAL CA: CPT

## 2023-05-08 PROCEDURE — 6360000002 HC RX W HCPCS: Performed by: STUDENT IN AN ORGANIZED HEALTH CARE EDUCATION/TRAINING PROGRAM

## 2023-05-08 PROCEDURE — 93452 LEFT HRT CATH W/VENTRCLGRPHY: CPT | Performed by: INTERNAL MEDICINE

## 2023-05-08 PROCEDURE — 2500000003 HC RX 250 WO HCPCS: Performed by: EMERGENCY MEDICINE

## 2023-05-08 PROCEDURE — 2709999900 HC NON-CHARGEABLE SUPPLY

## 2023-05-08 PROCEDURE — 96368 THER/DIAG CONCURRENT INF: CPT

## 2023-05-08 PROCEDURE — 4A023N7 MEASUREMENT OF CARDIAC SAMPLING AND PRESSURE, LEFT HEART, PERCUTANEOUS APPROACH: ICD-10-PCS | Performed by: INTERNAL MEDICINE

## 2023-05-08 PROCEDURE — 2500000003 HC RX 250 WO HCPCS

## 2023-05-08 PROCEDURE — 6360000002 HC RX W HCPCS

## 2023-05-08 PROCEDURE — 85025 COMPLETE CBC W/AUTO DIFF WBC: CPT

## 2023-05-08 PROCEDURE — 99291 CRITICAL CARE FIRST HOUR: CPT | Performed by: INTERNAL MEDICINE

## 2023-05-08 PROCEDURE — 36415 COLL VENOUS BLD VENIPUNCTURE: CPT

## 2023-05-08 PROCEDURE — 6370000000 HC RX 637 (ALT 250 FOR IP): Performed by: STUDENT IN AN ORGANIZED HEALTH CARE EDUCATION/TRAINING PROGRAM

## 2023-05-08 PROCEDURE — 6360000002 HC RX W HCPCS: Performed by: EMERGENCY MEDICINE

## 2023-05-08 PROCEDURE — 83880 ASSAY OF NATRIURETIC PEPTIDE: CPT

## 2023-05-08 PROCEDURE — 87635 SARS-COV-2 COVID-19 AMP PRB: CPT

## 2023-05-08 PROCEDURE — 93458 L HRT ARTERY/VENTRICLE ANGIO: CPT

## 2023-05-08 PROCEDURE — 2580000003 HC RX 258

## 2023-05-08 PROCEDURE — 2060000000 HC ICU INTERMEDIATE R&B

## 2023-05-08 PROCEDURE — 71260 CT THORAX DX C+: CPT

## 2023-05-08 PROCEDURE — B2111ZZ FLUOROSCOPY OF MULTIPLE CORONARY ARTERIES USING LOW OSMOLAR CONTRAST: ICD-10-PCS | Performed by: INTERNAL MEDICINE

## 2023-05-08 PROCEDURE — 99152 MOD SED SAME PHYS/QHP 5/>YRS: CPT

## 2023-05-08 PROCEDURE — 99285 EMERGENCY DEPT VISIT HI MDM: CPT

## 2023-05-08 PROCEDURE — 96365 THER/PROPH/DIAG IV INF INIT: CPT

## 2023-05-08 PROCEDURE — C1894 INTRO/SHEATH, NON-LASER: HCPCS

## 2023-05-08 PROCEDURE — 6370000000 HC RX 637 (ALT 250 FOR IP): Performed by: EMERGENCY MEDICINE

## 2023-05-08 PROCEDURE — 2500000003 HC RX 250 WO HCPCS: Performed by: STUDENT IN AN ORGANIZED HEALTH CARE EDUCATION/TRAINING PROGRAM

## 2023-05-08 PROCEDURE — 6360000004 HC RX CONTRAST MEDICATION: Performed by: STUDENT IN AN ORGANIZED HEALTH CARE EDUCATION/TRAINING PROGRAM

## 2023-05-08 PROCEDURE — 93005 ELECTROCARDIOGRAM TRACING: CPT | Performed by: STUDENT IN AN ORGANIZED HEALTH CARE EDUCATION/TRAINING PROGRAM

## 2023-05-08 PROCEDURE — 7100000010 HC PHASE II RECOVERY - FIRST 15 MIN

## 2023-05-08 PROCEDURE — 93005 ELECTROCARDIOGRAM TRACING: CPT | Performed by: EMERGENCY MEDICINE

## 2023-05-08 PROCEDURE — B2151ZZ FLUOROSCOPY OF LEFT HEART USING LOW OSMOLAR CONTRAST: ICD-10-PCS | Performed by: INTERNAL MEDICINE

## 2023-05-08 PROCEDURE — 7100000011 HC PHASE II RECOVERY - ADDTL 15 MIN

## 2023-05-08 RX ORDER — GABAPENTIN 300 MG/1
300 CAPSULE ORAL 3 TIMES DAILY
Status: DISCONTINUED | OUTPATIENT
Start: 2023-05-08 | End: 2023-05-09 | Stop reason: HOSPADM

## 2023-05-08 RX ORDER — HEPARIN SODIUM 10000 [USP'U]/100ML
5-30 INJECTION, SOLUTION INTRAVENOUS CONTINUOUS
Status: DISCONTINUED | OUTPATIENT
Start: 2023-05-08 | End: 2023-05-09

## 2023-05-08 RX ORDER — NICOTINE 21 MG/24HR
1 PATCH, TRANSDERMAL 24 HOURS TRANSDERMAL DAILY
Status: DISCONTINUED | OUTPATIENT
Start: 2023-05-08 | End: 2023-05-09 | Stop reason: HOSPADM

## 2023-05-08 RX ORDER — NITROGLYCERIN 20 MG/100ML
5-200 INJECTION INTRAVENOUS CONTINUOUS
Status: DISCONTINUED | OUTPATIENT
Start: 2023-05-08 | End: 2023-05-08 | Stop reason: HOSPADM

## 2023-05-08 RX ORDER — OXYCODONE HYDROCHLORIDE AND ACETAMINOPHEN 5; 325 MG/1; MG/1
1 TABLET ORAL 2 TIMES DAILY PRN
Status: DISCONTINUED | OUTPATIENT
Start: 2023-05-08 | End: 2023-05-09 | Stop reason: HOSPADM

## 2023-05-08 RX ORDER — PANTOPRAZOLE SODIUM 40 MG/1
40 TABLET, DELAYED RELEASE ORAL
Status: DISCONTINUED | OUTPATIENT
Start: 2023-05-08 | End: 2023-05-09 | Stop reason: HOSPADM

## 2023-05-08 RX ORDER — ACETAMINOPHEN 325 MG/1
650 TABLET ORAL EVERY 6 HOURS PRN
Status: DISCONTINUED | OUTPATIENT
Start: 2023-05-08 | End: 2023-05-09 | Stop reason: HOSPADM

## 2023-05-08 RX ORDER — SODIUM CHLORIDE 0.9 % (FLUSH) 0.9 %
5-40 SYRINGE (ML) INJECTION EVERY 12 HOURS SCHEDULED
Status: DISCONTINUED | OUTPATIENT
Start: 2023-05-08 | End: 2023-05-09 | Stop reason: HOSPADM

## 2023-05-08 RX ORDER — ASPIRIN 81 MG/1
81 TABLET, CHEWABLE ORAL DAILY
Status: DISCONTINUED | OUTPATIENT
Start: 2023-05-08 | End: 2023-05-09 | Stop reason: HOSPADM

## 2023-05-08 RX ORDER — ONDANSETRON 2 MG/ML
4 INJECTION INTRAMUSCULAR; INTRAVENOUS EVERY 6 HOURS PRN
Status: DISCONTINUED | OUTPATIENT
Start: 2023-05-08 | End: 2023-05-09 | Stop reason: HOSPADM

## 2023-05-08 RX ORDER — HEPARIN SODIUM 10000 [USP'U]/100ML
5-30 INJECTION, SOLUTION INTRAVENOUS CONTINUOUS
Status: DISCONTINUED | OUTPATIENT
Start: 2023-05-08 | End: 2023-05-08 | Stop reason: HOSPADM

## 2023-05-08 RX ORDER — HEPARIN SODIUM 1000 [USP'U]/ML
4000 INJECTION, SOLUTION INTRAVENOUS; SUBCUTANEOUS PRN
Status: DISCONTINUED | OUTPATIENT
Start: 2023-05-08 | End: 2023-05-08 | Stop reason: HOSPADM

## 2023-05-08 RX ORDER — ONDANSETRON 4 MG/1
4 TABLET, ORALLY DISINTEGRATING ORAL EVERY 8 HOURS PRN
Status: DISCONTINUED | OUTPATIENT
Start: 2023-05-08 | End: 2023-05-09 | Stop reason: HOSPADM

## 2023-05-08 RX ORDER — HEPARIN SODIUM 1000 [USP'U]/ML
2000 INJECTION, SOLUTION INTRAVENOUS; SUBCUTANEOUS PRN
Status: DISCONTINUED | OUTPATIENT
Start: 2023-05-08 | End: 2023-05-09

## 2023-05-08 RX ORDER — PANTOPRAZOLE SODIUM 40 MG/1
40 TABLET, DELAYED RELEASE ORAL
Status: DISCONTINUED | OUTPATIENT
Start: 2023-05-08 | End: 2023-05-08

## 2023-05-08 RX ORDER — SODIUM CHLORIDE 9 MG/ML
INJECTION, SOLUTION INTRAVENOUS CONTINUOUS
Status: DISCONTINUED | OUTPATIENT
Start: 2023-05-08 | End: 2023-05-09

## 2023-05-08 RX ORDER — HEPARIN SODIUM 1000 [USP'U]/ML
2000 INJECTION, SOLUTION INTRAVENOUS; SUBCUTANEOUS PRN
Status: DISCONTINUED | OUTPATIENT
Start: 2023-05-08 | End: 2023-05-08 | Stop reason: HOSPADM

## 2023-05-08 RX ORDER — NITROGLYCERIN 0.4 MG/1
0.4 TABLET SUBLINGUAL EVERY 5 MIN PRN
Status: DISCONTINUED | OUTPATIENT
Start: 2023-05-08 | End: 2023-05-08 | Stop reason: HOSPADM

## 2023-05-08 RX ORDER — HEPARIN SODIUM 1000 [USP'U]/ML
4000 INJECTION, SOLUTION INTRAVENOUS; SUBCUTANEOUS PRN
Status: DISCONTINUED | OUTPATIENT
Start: 2023-05-08 | End: 2023-05-09

## 2023-05-08 RX ORDER — ATORVASTATIN CALCIUM 40 MG/1
40 TABLET, FILM COATED ORAL DAILY
Status: DISCONTINUED | OUTPATIENT
Start: 2023-05-08 | End: 2023-05-09 | Stop reason: HOSPADM

## 2023-05-08 RX ORDER — PANTOPRAZOLE SODIUM 40 MG/1
40 TABLET, DELAYED RELEASE ORAL
Status: DISCONTINUED | OUTPATIENT
Start: 2023-05-09 | End: 2023-05-08

## 2023-05-08 RX ORDER — HEPARIN SODIUM 1000 [USP'U]/ML
4000 INJECTION, SOLUTION INTRAVENOUS; SUBCUTANEOUS ONCE
Status: COMPLETED | OUTPATIENT
Start: 2023-05-08 | End: 2023-05-08

## 2023-05-08 RX ORDER — ACETAMINOPHEN 500 MG
1000 TABLET ORAL ONCE
Status: COMPLETED | OUTPATIENT
Start: 2023-05-08 | End: 2023-05-08

## 2023-05-08 RX ORDER — FUROSEMIDE 10 MG/ML
20 INJECTION INTRAMUSCULAR; INTRAVENOUS ONCE
Status: COMPLETED | OUTPATIENT
Start: 2023-05-08 | End: 2023-05-08

## 2023-05-08 RX ORDER — ENOXAPARIN SODIUM 100 MG/ML
40 INJECTION SUBCUTANEOUS DAILY
Status: DISCONTINUED | OUTPATIENT
Start: 2023-05-08 | End: 2023-05-08 | Stop reason: SDUPTHER

## 2023-05-08 RX ORDER — ACETAMINOPHEN 650 MG/1
650 SUPPOSITORY RECTAL EVERY 6 HOURS PRN
Status: DISCONTINUED | OUTPATIENT
Start: 2023-05-08 | End: 2023-05-09 | Stop reason: HOSPADM

## 2023-05-08 RX ORDER — TIZANIDINE 4 MG/1
4 TABLET ORAL NIGHTLY
Status: DISCONTINUED | OUTPATIENT
Start: 2023-05-08 | End: 2023-05-09 | Stop reason: HOSPADM

## 2023-05-08 RX ORDER — SODIUM CHLORIDE 0.9 % (FLUSH) 0.9 %
5-40 SYRINGE (ML) INJECTION PRN
Status: DISCONTINUED | OUTPATIENT
Start: 2023-05-08 | End: 2023-05-09 | Stop reason: HOSPADM

## 2023-05-08 RX ORDER — LOSARTAN POTASSIUM 25 MG/1
12.5 TABLET ORAL DAILY
Status: DISCONTINUED | OUTPATIENT
Start: 2023-05-08 | End: 2023-05-09 | Stop reason: HOSPADM

## 2023-05-08 RX ORDER — POLYETHYLENE GLYCOL 3350 17 G/17G
17 POWDER, FOR SOLUTION ORAL DAILY PRN
Status: DISCONTINUED | OUTPATIENT
Start: 2023-05-08 | End: 2023-05-09 | Stop reason: HOSPADM

## 2023-05-08 RX ORDER — NITROGLYCERIN 20 MG/100ML
5-200 INJECTION INTRAVENOUS CONTINUOUS
Status: DISCONTINUED | OUTPATIENT
Start: 2023-05-08 | End: 2023-05-09 | Stop reason: HOSPADM

## 2023-05-08 RX ORDER — ASPIRIN 81 MG/1
324 TABLET, CHEWABLE ORAL ONCE
Status: DISCONTINUED | OUTPATIENT
Start: 2023-05-08 | End: 2023-05-08

## 2023-05-08 RX ORDER — SODIUM CHLORIDE 9 MG/ML
INJECTION, SOLUTION INTRAVENOUS PRN
Status: DISCONTINUED | OUTPATIENT
Start: 2023-05-08 | End: 2023-05-09 | Stop reason: HOSPADM

## 2023-05-08 RX ADMIN — SODIUM CHLORIDE, PRESERVATIVE FREE 10 ML: 5 INJECTION INTRAVENOUS at 11:47

## 2023-05-08 RX ADMIN — TIZANIDINE 4 MG: 4 TABLET ORAL at 20:09

## 2023-05-08 RX ADMIN — TICAGRELOR 90 MG: 90 TABLET ORAL at 20:09

## 2023-05-08 RX ADMIN — HEPARIN SODIUM 4000 UNITS: 1000 INJECTION INTRAVENOUS; SUBCUTANEOUS at 04:15

## 2023-05-08 RX ADMIN — FUROSEMIDE 20 MG: 10 INJECTION, SOLUTION INTRAMUSCULAR; INTRAVENOUS at 13:13

## 2023-05-08 RX ADMIN — PANTOPRAZOLE SODIUM 40 MG: 40 TABLET, DELAYED RELEASE ORAL at 20:09

## 2023-05-08 RX ADMIN — NITROGLYCERIN 5 MCG/MIN: 20 INJECTION INTRAVENOUS at 04:04

## 2023-05-08 RX ADMIN — NITROGLYCERIN 0.4 MG: 0.4 TABLET, ORALLY DISINTEGRATING SUBLINGUAL at 02:01

## 2023-05-08 RX ADMIN — NITROGLYCERIN 15 MCG/MIN: 20 INJECTION INTRAVENOUS at 06:54

## 2023-05-08 RX ADMIN — IOPAMIDOL 80 ML: 755 INJECTION, SOLUTION INTRAVENOUS at 02:33

## 2023-05-08 RX ADMIN — HEPARIN SODIUM 12 UNITS/KG/HR: 10000 INJECTION, SOLUTION INTRAVENOUS at 04:18

## 2023-05-08 RX ADMIN — ACETAMINOPHEN 1000 MG: 500 TABLET ORAL at 07:17

## 2023-05-08 RX ADMIN — METOPROLOL TARTRATE 25 MG: 25 TABLET ORAL at 20:09

## 2023-05-08 RX ADMIN — GABAPENTIN 300 MG: 300 CAPSULE ORAL at 20:09

## 2023-05-08 RX ADMIN — OXYCODONE HYDROCHLORIDE AND ACETAMINOPHEN 1 TABLET: 5; 325 TABLET ORAL at 20:09

## 2023-05-08 RX ADMIN — HEPARIN SODIUM 2000 UNITS: 1000 INJECTION INTRAVENOUS; SUBCUTANEOUS at 11:17

## 2023-05-08 ASSESSMENT — ENCOUNTER SYMPTOMS
VOMITING: 0
ABDOMINAL PAIN: 0
SORE THROAT: 0
SHORTNESS OF BREATH: 1
NAUSEA: 0
COUGH: 1
DIARRHEA: 0
DIARRHEA: 0
COUGH: 0
VOMITING: 0
NAUSEA: 0
FACIAL SWELLING: 0
SHORTNESS OF BREATH: 1
BLOOD IN STOOL: 0
BACK PAIN: 0
WHEEZING: 0

## 2023-05-08 ASSESSMENT — PAIN DESCRIPTION - LOCATION
LOCATION: SHOULDER;BACK
LOCATION: CHEST
LOCATION: CHEST

## 2023-05-08 ASSESSMENT — PAIN DESCRIPTION - DESCRIPTORS
DESCRIPTORS: ACHING
DESCRIPTORS: ACHING
DESCRIPTORS: ACHING;DISCOMFORT

## 2023-05-08 ASSESSMENT — PAIN DESCRIPTION - ORIENTATION: ORIENTATION: RIGHT

## 2023-05-08 ASSESSMENT — PAIN SCALES - GENERAL
PAINLEVEL_OUTOF10: 5
PAINLEVEL_OUTOF10: 4
PAINLEVEL_OUTOF10: 4

## 2023-05-08 ASSESSMENT — HEART SCORE: ECG: 2

## 2023-05-08 ASSESSMENT — PAIN - FUNCTIONAL ASSESSMENT: PAIN_FUNCTIONAL_ASSESSMENT: 0-10

## 2023-05-08 ASSESSMENT — LIFESTYLE VARIABLES: HOW OFTEN DO YOU HAVE A DRINK CONTAINING ALCOHOL: NEVER

## 2023-05-08 NOTE — CARE COORDINATION
Case Management Assessment  Initial Evaluation    Date/Time of Evaluation: 5/8/2023 12:24 PM  Assessment Completed by: Ceilne Agudelo RN    If patient is discharged prior to next notation, then this note serves as note for discharge by case management. Patient Name: Albin Swift                   YOB: 1965  Diagnosis: Chest pain [R07.9]  NSTEMI (non-ST elevated myocardial infarction) [facility]) [I21.4]                   Date / Time: 5/8/2023  6:25 AM    Patient Admission Status: Inpatient   Readmission Risk (Low < 19, Mod (19-27), High > 27): Readmission Risk Score: 14.6    Current PCP: Fletcher Quinn MD  PCP verified by CM? Yes    Chart Reviewed: Yes      History Provided by: Patient  Patient Orientation: Alert and Oriented    Patient Cognition: Alert    Hospitalization in the last 30 days (Readmission):  Yes    If yes, Readmission Assessment in CM Navigator will be completed. Advance Directives:      Code Status: Full Code   Patient's Primary Decision Maker is:        Discharge Planning:    Patient lives with: Spouse/Significant Other Type of Home: House  Primary Care Giver: Self  Patient Support Systems include: Spouse/Significant Other, Children, Family Members   Current Financial resources:    Current community resources:    Current services prior to admission: None            Current DME:              Type of Home Care services:  None    ADLS  Prior functional level: Independent in ADLs/IADLs  Current functional level: Independent in ADLs/IADLs    PT AM-PAC:   /24  OT AM-PAC:   /24    Family can provide assistance at DC: Yes  Would you like Case Management to discuss the discharge plan with any other family members/significant others, and if so, who?  No  Plans to Return to Present Housing:    Other Identified Issues/Barriers to RETURNING to current housing: none identified   Potential Assistance needed at discharge: N/A            Potential DME:    Patient expects to discharge to: [address] [place] [address]

## 2023-05-08 NOTE — ED PROVIDER NOTES
[facility] ED  EMERGENCY DEPARTMENT ENCOUNTER      Pt Name: Alex Umana  MRN: [id number]  [place] 1965  Date of evaluation: 5/8/2023  Provider: Mallorie Marquez, [facility]       Chief Complaint   Patient presents with    Chest Pain     Left sided chest pain woke up from sleep approx 30min ago         HISTORY OF PRESENT ILLNESS   (Location/Symptom, Timing/Onset, Context/Setting, Quality, Duration, Modifying Factors, Severity)  Note limiting factors. Alex Umana is a 66 y.o. female who presents to the emergency department with chest pain. Patient states that around 30 minutes ago she was woken up with coughing and heavy left-sided chest pain that is nonradiating without provoking or palliative factors. She states that she did have associated shortness of breath, diaphoresis, nausea. She was given full dose aspirin and sublingual nitro by EMS with some relief of symptoms. She notes that she had a heart attack about 2 weeks ago and had 3 cardiac stents placed in the RCA vessel. She states she has been taking her aspirin and Brilinta as prescribed and has not missed any doses. Denies any history of blood clots, leg swelling, calf cramping, hemoptysis, estrogen use. She notes that her sister has had blood clots before. HPI    Nursing Notes were reviewed. REVIEW OF SYSTEMS    (2-9 systems for level 4, 10 or more for level 5)     Review of Systems   Constitutional:  Negative for chills and fever. HENT:  Negative for congestion, facial swelling and sore throat. Eyes:  Negative for visual disturbance. Respiratory:  Positive for shortness of breath. Negative for cough and wheezing. Cardiovascular:  Positive for chest pain. Negative for palpitations and leg swelling. Gastrointestinal:  Negative for abdominal pain, blood in stool, diarrhea, nausea and vomiting. Genitourinary:  Negative for dysuria and hematuria. Musculoskeletal:  Negative for back pain and neck pain.

## 2023-05-08 NOTE — FLOWSHEET NOTE
Pt brought to room 502 from cath lab, assisted to BR and then into bed. Monitor connected and vitals obtained. Pt denies any pain at this time. Rt radial artery with vasc band in place no bleeding or hematoma noted.

## 2023-05-08 NOTE — ED PROVIDER NOTES
[facility]  ED  Emergency Department Encounter  Emergency Medicine Resident     Pt Name:Rita Quintanilla  MRN: [id number]  [place] 1965  Date of evaluation: 5/8/23  PCP:  Andrew Florence MD  Note Started: 6:29 AM EDT      CHIEF COMPLAINT       Chief Complaint   Patient presents with    Chest Pain     Stent placement 4/23, NSTEMI, [facility]       HISTORY OF PRESENT ILLNESS  (Location/Symptom, Timing/Onset, Context/Setting, Quality, Duration, Modifying Factors, Severity.)      Wily Wei is a 66 y.o. female who presents with a little bit and then had severe chest pain A. Patient CT is similar except 3 days ago. Patient states that she had a catheter 2 weeks ago, and 4 stents placed, chest pain status was then at that time. Patient states that she was never at time of chest pain. Chest pain has improved since nitro and heparin were started. Patient has pain about 4 out of 10, localized to midsternal area, nonradiating. Patient had some associated nausea, no active emesis, this is also improved after onset of nitro. Patient seen outside facility and transferred here per request of cardiology. Patient has CT PE that was negative for pulm embolism. Patient had a 20 troponins at outside study. Patient had EKG demonstrated T wave versions in the lateral leads    PAST MEDICAL / SURGICAL / SOCIAL / FAMILY HISTORY      has a past medical history of Acute on chronic systolic congestive heart failure ([address] Fibroid uterus, GERD (gastroesophageal reflux disease), HLD (hyperlipidemia), Hyperlipidemia, Rheumatoid arthritis ([address] and Tobacco abuse. No additional pertinent        has a past surgical history that includes laparoscopy; Cholecystectomy; Colonoscopy (04/27/2018); Back Injection (Right, 09/30/2022); and Cardiac catheterization (Right, 04/2023).   No additional pertinent       Social History     Socioeconomic History    Marital status:      Spouse name: Not on file
[facility]   Emergency Department  Faculty Attestation       I performed a history and physical examination of the patient and discussed management with the resident. I reviewed the residents note and agree with the documented findings including all diagnostic interpretations and plan of care. Any areas of disagreement are noted on the chart. I was personally present for the key portions of any procedures. I have documented in the chart those procedures where I was not present during the key portions. I have reviewed the emergency nurses triage note. I agree with the chief complaint, past medical history, past surgical history, allergies, medications, social and family history as documented unless otherwise noted below. For Physician Assistant/ Nurse Practitioner cases/documentation I have personally evaluated this patient and have completed at least one if not all key elements of the E/M (history, physical exam, and MDM). Additional findings are as noted. Patient Name: Gabrielle Richey  MRN: [id number]  : 1965  Primary Care Physician: Lidia Watters MD    Date of evaluationa: 2023   Note Started: 7:29 AM EDT    Pertinent Comments     Chief Complaint:   Chief Complaint   Patient presents with    Chest Pain     Stent placement , NSTEMI, [place]        Initial vitals: (If not listed, please see nursing documentation)  ED Triage Vitals   BP Temp Temp Source Pulse Respirations SpO2 Height Weight   23 0631 23 0634 23 0629 23 0631 23 0631 23 0633 -- --   114/87 98.2 °F (36.8 °C) Oral 94 19 94 %          HPI/PE/Impression: This is a 66 y.o. female who presents to the Emergency Department as a transfer from [facility] due to NSTEMI. Patient had a inferior MI approximately 2 weeks ago, was evaluated per facility and had stents placed.   Patient states that approximate 2 days ago she had chest pressure and difficulty breathing with exertion
Physician    (Please note that portions of this note were completed with a voice recognition program.  Efforts were made to edit the dictations but occasionally words are mis-transcribed.)         Esteban Steve MD  05/08/23 0259

## 2023-05-08 NOTE — CONSULTS
[facility]   Consult Note                 Date:   5/8/2023  Patient name: Yee Moreno  Date of admission:  5/8/2023  6:25 AM  MRN:   [id number]  YOB: 1965    Reason for Consult:  cardiac eval     CHIEF COMPLAINT:  Chest Pain    History Obtained From:  Patient and EMR    HISTORY OF PRESENT ILLNESS:    The patient is a 66 y.o. female with significant medical history of GERD, hyperlipidemia, rheumatoid arthritis, recent admission for inferior STEMI 4/23/2023 s/p cardiac stent cath, thrombectomy and PCI of the RCA. Patient states she had 4 stents placed. Patient states that 2 days ago she she was having some chest pressure that went away when she took her dose of aspirin. 1 night ago she did not have any pain and slept well. Last night she woke up at 1 AM sudden onset chest pressure and shortness of breath. She also had a cough at this time. Echo from 4/23/2023 showed moderately Doose left ventricular systolic function with an EF of 35 to 40%, IVC dilated. Cardiac catheterization from 4/24/2023 showed 100% proximal stenosis of the RCA which was reduced to 0% with thrombectomy, mid stenosis AV 80% reduced to 0 with stent, RPL had 100% stenosis reduced to 0% with thrombectomy stent was placed. Patient on aspirin 81 mg, losartan 25 mg, metoprolol tartrate 25 mg, ticagrelor 90 mg twice daily. Past Medical History:   has a past medical history of Fibroid uterus, GERD (gastroesophageal reflux disease), Hyperlipidemia, Rheumatoid arthritis ([address] and Tobacco abuse. Past Surgical History:   has a past surgical history that includes laparoscopy; Cholecystectomy; Colonoscopy (04/27/2018); Back Injection (Right, 09/30/2022); and Cardiac catheterization (Right, 04/2023). Home Medications:    Prior to Admission medications    Medication Sig Start Date End Date Taking?  Authorizing Provider   aspirin 81 MG chewable tablet Take 1 tablet by mouth daily 4/28/23   Christina Puga MD

## 2023-05-08 NOTE — ED NOTES
Cardiology at bedside.       Balta Ramires RN  05/08/23 8945
Cardiology at bedside.       Wade Garcia RN  05/08/23 6937
Cath lab at bedside.       Rita Tejeda RN  05/08/23 8356
Komal Delgado  [birth year]  65 yo female  Inferior STEMI 2 weeks ago   3 stents in RCA  On ASA and Brilinta  Severe crushing chest pain   92% on RA  Troponin starting to increasing again 75 and 123   Inferolateral t wave inversions unchanged  CT scan with no PE, new onset CHF with new pulm edema   Heparin and victor manuel    Spoke to Dr. Miguel Angel Chapin, RN  05/08/23 3408
Pt arrived to ED life flight mobile from  [place] with c/o L sided chest pain that woke her from her sleep. Pt states that she is having some SOB, difficulty taking a deep breath. Pt describes the pain as a heavy pressure in her chest.   Pt had 3 stents placed on 4/32/23 d/t inferior STEMI. She was given 324 mg asa PTA. Trop increased from 75 to 123. Pt currently has nitro drio running in R hand at 4.5 ml/hr and heparin running at 12U/kg/hr that was started in [place] at 0418. Pt had CT completed- showed no PE some pulm edema and small pleural effusion. Pt rates pain a 5/10. Pt placed on full cardiac monitor. White board updated, cll light given to patient.  Pt placed on 3L CECILIA Leal RN  05/08/23 1970
Pt assisted to bedside commode to urinate. Pt short of breath upon exertion. Pt assisted back into bed. Call light in reach.       Santi Villasenor RN  05/08/23 9021
Spoke with cath lab for ETA for cath, unknown at this time, will be done today per cath.       Shayna Rivas RN  05/08/23 0615
The following labs were labeled with appropriate pt sticker and tubed to lab:     [x] Blue     [x] Lavender   [] on ice  [x] Green/yellow  [x] Green/black [x] on ice  [] Soo Even  [] on ice  [] Yellow  [] Red  [] Type/ Screen  [] ABG  [] VBG    [] COVID-19 swab    [] Rapid  [] PCR  [] Flu swab  [] Peds Viral Panel     [] Urine Sample  [] Fecal Sample  [] Pelvic Cultures  [] Blood Cultures  [] X 2  [] STREP Cultures       Gayathri Virk RN  05/08/23 0710       Gayathri Virk RN  05/08/23 7648
Writer and cath lab nurse taking patient to cath lab. Patient on [facility] for transport.       Archie Miller RN  05/08/23 1550
tablet 1 tablet    losartan (COZAAR) tablet 12.5 mg    sodium chloride flush 0.9 % injection 5-40 mL    sodium chloride flush 0.9 % injection 5-40 mL    0.9 % sodium chloride infusion    enoxaparin (LOVENOX) injection 40 mg     Order Specific Question:   Indication of Use     Answer:   Prophylaxis-DVT/PE    OR Linked Order Group     ondansetron (ZOFRAN-ODT) disintegrating tablet 4 mg     ondansetron (ZOFRAN) injection 4 mg    polyethylene glycol (GLYCOLAX) packet 17 g    OR Linked Order Group     acetaminophen (TYLENOL) tablet 650 mg     acetaminophen (TYLENOL) suppository 650 mg       SURGICAL HISTORY       Past Surgical History:   Procedure Laterality Date    BACK INJECTION Right 09/30/2022    Right SACROILIAC  & Piriformis JOINT INJECTION performed by Margarito Osoiro MD at [place] [address] Right 04/2023    [facility]    CHOLECYSTECTOMY      COLONOSCOPY  04/27/2018    [id number]    LAPAROSCOPY      exploratory\       PAST MEDICAL HISTORY       Past Medical History:   Diagnosis Date    Acute on chronic systolic congestive heart failure ([facility] [address] 5/8/2023    Fibroid uterus     removed with laparoscopy     GERD (gastroesophageal reflux disease)     HLD (hyperlipidemia) 5/8/2023    Hyperlipidemia     Rheumatoid arthritis ([facility] [address]     Tobacco abuse        Labs:  Labs Reviewed   CBC WITH AUTO DIFFERENTIAL - Abnormal; Notable for the following components:       Result Value    Seg Neutrophils 34 (*)     Lymphocytes 53 (*)     All other components within normal limits   BASIC METABOLIC PANEL - Abnormal; Notable for the following components:    Glucose 118 (*)     Creatinine 0.49 (*)     Chloride 111 (*)     CO2 17 (*)     All other components within normal limits   TROPONIN - Abnormal; Notable for the following components:    Troponin, High Sensitivity 170 (*)     All other components within normal limits   APTT - Abnormal; Notable for the following components:    PTT 38.5 (*)     All other components within

## 2023-05-08 NOTE — H&P
[address]     Department of Internal Medicine - Staff Internal Medicine Teaching Service          ADMISSION NOTE/HISTORY AND PHYSICAL EXAMINATION   Date: 5/8/2023  Patient Name: Albin Swift  Date of admission: 5/8/2023  6:25 AM  YOB: 1965  PCP: Fletcher Quinn MD  History Obtained From:  patient, electronic medical record    CHIEF COMPLAINT     Chief complaint: Chest pain and cough    HISTORY OF PRESENTING ILLNESS     The patient is a pleasant 66 y.o. female with a past medical history significant for CAD status post STEMI on 4/23/2023 had 4 LEDA to RCA and thrombectomy. Patient reported waking up in the middle of the night with cough and chest pressure with right arm numbness and mild diaphoresis. EMS gave full dose of aspirin and she received nitro which helped with chest pain. Initial labs showed troponin 70/123/170 and EKG showed Q wave changes suggestive of inferior ischemia. proBNP 5620, last Echo 4/25/2023 EF 35-40%. Moderate MR. Patient reported being compliant with aspirin and Brilinta and other home medications. Also reported smoking even after the last STEMI. Has a history of 1/2 pack a day for more than 30 years. Past medical history includes hypertension, hyperlipidemia, and GERD. CT showed negative PE, moderate emphysema, cardiomegaly with diffuse pulmonary edema and pleural effusion small in size.     Review of Systems:  General ROS: Completed and except as mentioned above were negative   HEENT ROS: Completed and except as mentioned above were negative   Allergy and Immunology ROS:  Completed and except as mentioned above were negative  Hematological and Lymphatic ROS:  Completed and except as mentioned above were negative  Respiratory ROS:  Completed and except as mentioned above were negative  Cardiovascular ROS:  Completed and except as mentioned above were negative  Gastrointestinal ROS: Completed and except as mentioned above were

## 2023-05-08 NOTE — OP NOTE
[facility]    CARDIAC CATHETERIZATION    Date:   5/8/2023  Patient name:  Ekaterina Jin  Date of admission:  5/8/2023  6:25 AM  MRN:   [id number]  YOB: 1965    Operators:  Primary:   Lisa Garland MD (Attending Physician)      Procedure performed:      [x] Left Heart Catheterization. [] Graft Angiography. [x] Left Ventriculography. [] Right Heart Catheterization. [x] Coronary Angiography. [] Aortic Valve Studies. [] PCI:      [] Other:       Pre Procedure Conscious Sedation Data:  ASA Class:    [] I [] II [x] III [] IV    Mallampati Class:  [x] I [] II [] III [] IV      Indication:  [] STEMI      [] + Stress test  [x] ACS      [] + EKG Changes  [] Non Q MI       [] Significant Risk Factors  [] Recurrent Angina             [] Diabetes Mellitus    [] New LBBB      [] Uncontrolled HTN. [] CHF / Low EF changes     [] Abnormal CTA / Ca Score      Procedure:  Access:  [] Femoral  [x] Radial  artery       [x] Right  [] Left    Procedure: After informed consent was obtained with explanation of the risks and benefits, patient was brought to the cath lab. The access area was prepped and draped in sterile fashion. 1% lidocaine was used for local block. The artery was cannulated with 6  Fr sheath with brisk arterial blood return. The side port was frequently flushed and aspirated with normal saline. Findings:     Angiographic Findings        Cardiac Arteries and Lesion Findings       LMCA: Distal 40% stenosis     LAD: Proximal 40% stenosis     LCx: Mild irregularities 20-30%. Non dominant     RCA: Wide patent all stents in dominant RCA   PDA and PL branches are patent      Coronary Tree        Dominance: Right    Procedure Summary        Mild to moderate left main disease 40%    25% stenosis in LAD    Small LCX    Dominant RCA with patent stents from April 2023.     Mild LV dysfunction with inferior hypokinesia        Recommendations        Medical treatment    Monitor LM disease

## 2023-05-09 ENCOUNTER — APPOINTMENT (OUTPATIENT)
Dept: GENERAL RADIOLOGY | Age: 58
DRG: 282 | End: 2023-05-09
Payer: COMMERCIAL

## 2023-05-09 VITALS
SYSTOLIC BLOOD PRESSURE: 92 MMHG | DIASTOLIC BLOOD PRESSURE: 72 MMHG | HEART RATE: 86 BPM | RESPIRATION RATE: 16 BRPM | WEIGHT: 158.07 LBS | BODY MASS INDEX: 24.81 KG/M2 | OXYGEN SATURATION: 92 % | HEIGHT: 67 IN | TEMPERATURE: 98.2 F

## 2023-05-09 LAB
ABSOLUTE EOS #: 0.05 K/UL (ref 0–0.44)
ABSOLUTE IMMATURE GRANULOCYTE: <0.03 K/UL (ref 0–0.3)
ABSOLUTE LYMPH #: 2.83 K/UL (ref 1.1–3.7)
ABSOLUTE MONO #: 0.65 K/UL (ref 0.1–1.2)
ANION GAP SERPL CALCULATED.3IONS-SCNC: 13 MMOL/L (ref 9–17)
BASOPHILS # BLD: 1 % (ref 0–2)
BASOPHILS ABSOLUTE: 0.07 K/UL (ref 0–0.2)
BUN SERPL-MCNC: 15 MG/DL (ref 6–20)
CALCIUM SERPL-MCNC: 9.3 MG/DL (ref 8.6–10.4)
CHLORIDE SERPL-SCNC: 109 MMOL/L (ref 98–107)
CO2 SERPL-SCNC: 17 MMOL/L (ref 20–31)
CREAT SERPL-MCNC: 0.54 MG/DL (ref 0.5–0.9)
EOSINOPHILS RELATIVE PERCENT: 1 % (ref 1–4)
GFR SERPL CREATININE-BSD FRML MDRD: >60 ML/MIN/1.73M2
GLUCOSE SERPL-MCNC: 124 MG/DL (ref 70–99)
HCT VFR BLD AUTO: 38.3 % (ref 36.3–47.1)
HGB BLD-MCNC: 12.5 G/DL (ref 11.9–15.1)
IMMATURE GRANULOCYTES: 0 %
LYMPHOCYTES # BLD: 58 % (ref 24–43)
MCH RBC QN AUTO: 30.9 PG (ref 25.2–33.5)
MCHC RBC AUTO-ENTMCNC: 32.6 G/DL (ref 28.4–34.8)
MCV RBC AUTO: 94.6 FL (ref 82.6–102.9)
MONOCYTES # BLD: 13 % (ref 3–12)
NRBC AUTOMATED: 0 PER 100 WBC
PDW BLD-RTO: 14.6 % (ref 11.8–14.4)
PLATELET # BLD AUTO: 264 K/UL (ref 138–453)
PMV BLD AUTO: 11 FL (ref 8.1–13.5)
POTASSIUM SERPL-SCNC: 3.6 MMOL/L (ref 3.7–5.3)
RBC # BLD: 4.05 M/UL (ref 3.95–5.11)
RBC # BLD: ABNORMAL 10*6/UL
SEG NEUTROPHILS: 27 % (ref 36–65)
SEGMENTED NEUTROPHILS ABSOLUTE COUNT: 1.32 K/UL (ref 1.5–8.1)
SODIUM SERPL-SCNC: 139 MMOL/L (ref 135–144)
WBC # BLD AUTO: 4.9 K/UL (ref 3.5–11.3)

## 2023-05-09 PROCEDURE — 99238 HOSP IP/OBS DSCHRG MGMT 30/<: CPT | Performed by: INTERNAL MEDICINE

## 2023-05-09 PROCEDURE — 94640 AIRWAY INHALATION TREATMENT: CPT

## 2023-05-09 PROCEDURE — 2580000003 HC RX 258

## 2023-05-09 PROCEDURE — 6370000000 HC RX 637 (ALT 250 FOR IP)

## 2023-05-09 PROCEDURE — 94761 N-INVAS EAR/PLS OXIMETRY MLT: CPT

## 2023-05-09 PROCEDURE — 97161 PT EVAL LOW COMPLEX 20 MIN: CPT

## 2023-05-09 PROCEDURE — 36415 COLL VENOUS BLD VENIPUNCTURE: CPT

## 2023-05-09 PROCEDURE — 85025 COMPLETE CBC W/AUTO DIFF WBC: CPT

## 2023-05-09 PROCEDURE — 97535 SELF CARE MNGMENT TRAINING: CPT

## 2023-05-09 PROCEDURE — 97530 THERAPEUTIC ACTIVITIES: CPT

## 2023-05-09 PROCEDURE — 71045 X-RAY EXAM CHEST 1 VIEW: CPT

## 2023-05-09 PROCEDURE — 97165 OT EVAL LOW COMPLEX 30 MIN: CPT

## 2023-05-09 PROCEDURE — 80048 BASIC METABOLIC PNL TOTAL CA: CPT

## 2023-05-09 RX ORDER — ALBUTEROL SULFATE 90 UG/1
2 AEROSOL, METERED RESPIRATORY (INHALATION) EVERY 6 HOURS PRN
Status: DISCONTINUED | OUTPATIENT
Start: 2023-05-09 | End: 2023-05-09 | Stop reason: HOSPADM

## 2023-05-09 RX ORDER — POTASSIUM CHLORIDE 20 MEQ/1
20 TABLET, EXTENDED RELEASE ORAL 2 TIMES DAILY WITH MEALS
Qty: 60 TABLET | Refills: 0 | Status: SHIPPED | OUTPATIENT
Start: 2023-05-10

## 2023-05-09 RX ORDER — POTASSIUM CHLORIDE 20 MEQ/1
20 TABLET, EXTENDED RELEASE ORAL 2 TIMES DAILY WITH MEALS
Status: DISCONTINUED | OUTPATIENT
Start: 2023-05-10 | End: 2023-05-09 | Stop reason: HOSPADM

## 2023-05-09 RX ORDER — BUDESONIDE AND FORMOTEROL FUMARATE DIHYDRATE 160; 4.5 UG/1; UG/1
2 AEROSOL RESPIRATORY (INHALATION) 2 TIMES DAILY
Qty: 10.2 G | Refills: 3 | Status: SHIPPED | OUTPATIENT
Start: 2023-05-09

## 2023-05-09 RX ORDER — ALBUTEROL SULFATE 90 UG/1
2 AEROSOL, METERED RESPIRATORY (INHALATION) EVERY 6 HOURS PRN
Qty: 18 G | Refills: 3 | Status: SHIPPED | OUTPATIENT
Start: 2023-05-09

## 2023-05-09 RX ORDER — BUDESONIDE AND FORMOTEROL FUMARATE DIHYDRATE 160; 4.5 UG/1; UG/1
2 AEROSOL RESPIRATORY (INHALATION) 2 TIMES DAILY
Status: DISCONTINUED | OUTPATIENT
Start: 2023-05-09 | End: 2023-05-09 | Stop reason: HOSPADM

## 2023-05-09 RX ORDER — METOPROLOL SUCCINATE 25 MG/1
12.5 TABLET, EXTENDED RELEASE ORAL NIGHTLY
Qty: 30 TABLET | Refills: 3 | Status: SHIPPED | OUTPATIENT
Start: 2023-05-10

## 2023-05-09 RX ORDER — METOPROLOL SUCCINATE 25 MG/1
12.5 TABLET, EXTENDED RELEASE ORAL NIGHTLY
Status: DISCONTINUED | OUTPATIENT
Start: 2023-05-10 | End: 2023-05-09 | Stop reason: HOSPADM

## 2023-05-09 RX ORDER — FUROSEMIDE 20 MG/1
20 TABLET ORAL DAILY
Qty: 60 TABLET | Refills: 3 | Status: SHIPPED | OUTPATIENT
Start: 2023-05-10

## 2023-05-09 RX ORDER — POTASSIUM CHLORIDE 20 MEQ/1
40 TABLET, EXTENDED RELEASE ORAL 2 TIMES DAILY WITH MEALS
Status: DISCONTINUED | OUTPATIENT
Start: 2023-05-09 | End: 2023-05-09 | Stop reason: HOSPADM

## 2023-05-09 RX ORDER — FUROSEMIDE 20 MG/1
20 TABLET ORAL DAILY
Status: DISCONTINUED | OUTPATIENT
Start: 2023-05-10 | End: 2023-05-09 | Stop reason: HOSPADM

## 2023-05-09 RX ADMIN — TICAGRELOR 90 MG: 90 TABLET ORAL at 08:02

## 2023-05-09 RX ADMIN — LOSARTAN POTASSIUM 12.5 MG: 25 TABLET, FILM COATED ORAL at 08:02

## 2023-05-09 RX ADMIN — GABAPENTIN 300 MG: 300 CAPSULE ORAL at 08:02

## 2023-05-09 RX ADMIN — TIOTROPIUM BROMIDE INHALATION SPRAY 2 PUFF: 3.12 SPRAY, METERED RESPIRATORY (INHALATION) at 10:09

## 2023-05-09 RX ADMIN — DESMOPRESSIN ACETATE 40 MG: 0.2 TABLET ORAL at 08:02

## 2023-05-09 RX ADMIN — PANTOPRAZOLE SODIUM 40 MG: 40 TABLET, DELAYED RELEASE ORAL at 05:55

## 2023-05-09 RX ADMIN — ACETAMINOPHEN 650 MG: 325 TABLET ORAL at 08:00

## 2023-05-09 RX ADMIN — SODIUM CHLORIDE, PRESERVATIVE FREE 10 ML: 5 INJECTION INTRAVENOUS at 08:09

## 2023-05-09 RX ADMIN — BUDESONIDE AND FORMOTEROL FUMARATE DIHYDRATE 2 PUFF: 160; 4.5 AEROSOL RESPIRATORY (INHALATION) at 10:09

## 2023-05-09 RX ADMIN — GABAPENTIN 300 MG: 300 CAPSULE ORAL at 14:37

## 2023-05-09 RX ADMIN — METOPROLOL TARTRATE 25 MG: 25 TABLET ORAL at 08:02

## 2023-05-09 RX ADMIN — POTASSIUM CHLORIDE 40 MEQ: 1500 TABLET, EXTENDED RELEASE ORAL at 08:21

## 2023-05-09 RX ADMIN — ASPIRIN 81 MG: 81 TABLET, CHEWABLE ORAL at 08:02

## 2023-05-09 ASSESSMENT — PAIN SCALES - GENERAL: PAINLEVEL_OUTOF10: 5

## 2023-05-09 ASSESSMENT — PAIN DESCRIPTION - DESCRIPTORS: DESCRIPTORS: ACHING

## 2023-05-09 ASSESSMENT — PAIN DESCRIPTION - LOCATION: LOCATION: HEAD

## 2023-05-09 NOTE — PLAN OF CARE
Problem: Discharge Planning  Goal: Discharge to home or other facility with appropriate resources  Outcome: Completed     Problem: Safety - Adult  Goal: Free from fall injury  Outcome: Completed     Problem: ABCDS Injury Assessment  Goal: Absence of physical injury  Outcome: Completed     Problem: Chronic Conditions and Co-morbidities  Goal: Patient's chronic conditions and co-morbidity symptoms are monitored and maintained or improved  Outcome: Completed     Problem: Respiratory - Adult  Goal: Achieves optimal ventilation and oxygenation  5/9/2023 1614 by Adore Varela RN  Outcome: Completed  5/9/2023 1014 by Bertram Graff RCP  Outcome: Progressing  5/9/2023 0904 by Bertram Graff RCP  Outcome: Progressing

## 2023-05-09 NOTE — CARE COORDINATION
Discharge 1 [facility] Case Management Department  Written by: Neo Hitchcock RN    Patient Name: Dunia Tejada  Attending Provider: Apollo Johnson MD  Admit Date: 2023  6:25 AM  MRN: [id number]  Account: [de-identified]                     : 1965  Discharge Date:       Disposition: home    Neo Hitchcock RN

## 2023-05-09 NOTE — CARE COORDINATION
05/08/23 1226   Readmission Assessment   Number of Days since last admission? 8-30 days   Previous Disposition Home with Family   Who is being Interviewed Patient   What was the patient's/caregiver's perception as to why they think they needed to return back to the hospital? Other (Comment)  (chest pain - transfer from [facility])   Did you visit your Primary Care Physician after you left the hospital, before you returned this time? Yes   Did you see a specialist, such as Cardiac, Pulmonary, Orthopedic Physician, etc. after you left the hospital? No   Who advised the patient to return to the hospital? Self-referral   Does the patient report anything that got in the way of taking their medications? No   In our efforts to provide the best possible care to you and others like you, can you think of anything that we could have done to help you after you left the hospital the first time, so that you might not have needed to return so soon?  Other (Comment)  (unsure)

## 2023-05-09 NOTE — DISCHARGE INSTRUCTIONS
-Please take medications as prescribed. -Follow-up with your family doctor and heart doctor to adjust dosing of medications  -If your symptoms recur or worsen, please go to the nearest emergency department or call 911.   -Stop smoking as it will make your symptoms worse.

## 2023-05-09 NOTE — PROGRESS NOTES
CLINICAL PHARMACY NOTE: MEDS TO BEDS    Total # of Prescriptions Filled: 6   The following medications were delivered to the patient:  BUDESONIDE 160 INH   ALBUTEROL INH   METOPROLOL SUCC ER 25   SPIRIVA 2.5  LASIX 20   POTASIUM ER 20     Additional Documentation:
Congestive Heart Failure Education completed and charted. CHF booklet given. Patient was receptive to education. Discussed the  importance of medication compliance. Discussed the importance of a heart healthy diet. Discussed 2000 mg sodium-restricted daily diet. Patient instructed to limit fluid intake to  1.5 to 2 liters per day. Patient instructed to weigh self at the same time of each day each morning, reinforced teaching to monitor for 3-5 lb weight increase over 1-2 days notify physician if change noted. Signs and symptoms of CHF discussed with patient, such as feeling more tired than normal, feeling short of breath, coughing that increases when lying down, sudden weight gain, swelling of the feet, legs or belly. Patient verbalized understanding to notify physician office if these symptoms occur. EF 35-40%   Pt is agreeable to an outpatient CHF Clinic referral at [facility]. Referral placed.
Meds to bed delivered. Discharge papers given. IV removed per protocol. All questions answered.
Occupational Therapy  [facility]  Occupational Therapy Initial Assessment    Name: Katty Gomez  : 1965  MRN: [id number]  Date of Service: 2023  Chief Complaint   Patient presents with    Chest Pain     Stent placement , NSTEMI, [place]       Discharge Recommendations: No therapy recommended at discharge. Defer OT at this time        Patient Diagnosis(es): The primary encounter diagnosis was NSTEMI (non-ST elevated myocardial infarction) ([facility] [address] A diagnosis of Acute on chronic systolic congestive heart failure (HCC) was also pertinent to this visit. Past Medical History:  has a past medical history of Acute on chronic systolic congestive heart failure ([facility] [address] Fibroid uterus, GERD (gastroesophageal reflux disease), HLD (hyperlipidemia), Hyperlipidemia, Rheumatoid arthritis ([facility] [address] and Tobacco abuse. Past Surgical History:  has a past surgical history that includes laparoscopy; Cholecystectomy; Colonoscopy (2018); Back Injection (Right, 2022); and Cardiac catheterization (Right, 2023).        Assessment   Prognosis: Good  Decision Making: Low Complexity  REQUIRES OT FOLLOW-UP: No  Activity Tolerance  Activity Tolerance: Patient limited by fatigue;Patient Tolerated treatment well        Restrictions  Restrictions/Precautions  Restrictions/Precautions: Up as Tolerated, General Precautions  Required Braces or Orthoses?: No  Position Activity Restriction  Other position/activity restrictions: Neg for PE    Subjective   General  Patient assessed for rehabilitation services?: Yes  Family / Caregiver Present: No  Diagnosis: chest pressure, SOB, cough, recent MI/4 stents  Subjective  Subjective: 0/10 pain level this date  General Comment  Comments: RN approved therapy session     Social/Functional History  Social/Functional History  Lives With: Spouse  Type of Home: House  Home Layout: One level  Home Access: Stairs to enter without rails  Entrance Stairs -
Patient received post cath to [facility] 9. Assessment obtained. Post cath pathway initiated. Right radial site with vasband intact with 8 mL of air. No hematoma noted. Restrictions reviewed with patient and family. Patient without complaints.
Patient requesting nexium from home. Per pharmacy protonix is the equal medication. 40 mg Protonix daily ordered. Called pharmacy to send medication over.
Patient transferred to [facility] by [facility]. Report called to Atiya Easley RN, all questions answered.
Physical Therapy  [facility]  Physical Therapy Initial Assessment    Name: Chanell Medrano  : 1965  MRN: [id number]  Date of Service: 2023    Discharge Recommendations: No therapy recommended at discharge. Chief Complaint   Patient presents with    Chest Pain     Stent placement , NSTEMI, [place]     The patient is a pleasant 66 y.o. female with a past medical history significant for CAD status post STEMI on 2023 had 4 LEDA to RCA and thrombectomy. Patient reported waking up in the middle of the night with cough and chest pressure with right arm numbness and mild diaphoresis. EMS gave full dose of aspirin and she received nitro which helped with chest pain. PT Equipment Recommendations  Equipment Needed: No      Patient Diagnosis(es): The encounter diagnosis was NSTEMI (non-ST elevated myocardial infarction) ([facility] [address] Past Medical History:  has a past medical history of Acute on chronic systolic congestive heart failure ([facility] [address] Fibroid uterus, GERD (gastroesophageal reflux disease), HLD (hyperlipidemia), Hyperlipidemia, Rheumatoid arthritis ([facility] [address] and Tobacco abuse. Past Surgical History:  has a past surgical history that includes laparoscopy; Cholecystectomy; Colonoscopy (2018); Back Injection (Right, 2022); and Cardiac catheterization (Right, 2023). Assessment   Assessment: Pt ambulates 200 ft with no AD independently. Pt should be safe to return to prior living situation with intermittent support as needed. pt educated on d/c PT, agreeable. Therapy Prognosis: Good  Decision Making: Low Complexity  Barriers to Learning: none  Requires PT Follow-Up: No  Activity Tolerance  Activity Tolerance: Patient tolerated treatment well     Plan   Physcial Therapy Plan  General Plan:  (d/c PT)  Safety Devices  Type of Devices:  All fall risk precautions in place, Gait belt, Call light within reach, Left in bed
[facility]  Progress Note                   Date:   5/9/2023  Patient name: Doretha Beasley  Date of admission:  5/8/2023  6:25 AM  MRN:   [id number]  YOB: 1965  PCP: Olivia Perla MD    Reason for Admission: Chest pain [R07.9]  NSTEMI (non-ST elevated myocardial infarction) [facility]) [I21.4]    Subjective:       Clinical Changes /Abnormalities:Patient seen and examined. Denies chest pain or shortness of breath. Tele/vitals/labs reviewed . Discussed case with RN. Review of Systems    Medications:   Scheduled Meds:   potassium chloride  40 mEq Oral BID WC    budesonide-formoterol  2 puff Inhalation BID    tiotropium  2 puff Inhalation Daily    nicotine  1 patch TransDERmal Daily    aspirin  81 mg Oral Daily    atorvastatin  40 mg Oral Daily    metoprolol tartrate  25 mg Oral BID    ticagrelor  90 mg Oral BID    tiZANidine  4 mg Oral Nightly    gabapentin  300 mg Oral TID    losartan  12.5 mg Oral Daily    sodium chloride flush  5-40 mL IntraVENous 2 times per day    pantoprazole  40 mg Oral QAM AC     Continuous Infusions:   nitroGLYCERIN Stopped (05/08/23 1137)    sodium chloride       CBC:   Recent Labs     05/08/23  0206 05/08/23  0646 05/09/23  0556   WBC 4.8 6.2 4.9   HGB 12.7 12.3 12.5    241 264     BMP:    Recent Labs     05/08/23  0206 05/08/23  0646 05/09/23  0556    138 139   K 3.7 4.1 3.6*   * 111* 109*   CO2 19* 17* 17*   BUN 21* 20 15   CREATININE 0.72 0.49* 0.54   GLUCOSE 222* 118* 124*     Hepatic:No results for input(s): AST, ALT, ALB, BILITOT, ALKPHOS in the last 72 hours. Troponin:   Recent Labs     05/08/23  0206 05/08/23  0303 05/08/23  0646   TROPHS 75* 123* 170*     BNP: No results for input(s): BNP in the last 72 hours. Lipids: No results for input(s): CHOL, HDL in the last 72 hours. Invalid input(s): LDLCALCU  INR: No results for input(s): INR in the last 72 hours. ECHO Summary  The Left ventricle appears normal in size.  Normal
consciousness, thought content and emotional status is normal.     Medications:  Scheduled Medications:    nicotine  1 patch TransDERmal Daily    aspirin  81 mg Oral Daily    atorvastatin  40 mg Oral Daily    metoprolol tartrate  25 mg Oral BID    ticagrelor  90 mg Oral BID    tiZANidine  4 mg Oral Nightly    gabapentin  300 mg Oral TID    losartan  12.5 mg Oral Daily    sodium chloride flush  5-40 mL IntraVENous 2 times per day    pantoprazole  40 mg Oral QAM AC     Continuous Infusions:    nitroGLYCERIN Stopped (05/08/23 1137)    heparin (PORCINE) Infusion 14 Units/kg/hr (05/08/23 1117)    sodium chloride      sodium chloride 75 mL/hr at 05/08/23 1804     PRN Medicationsheparin (porcine) PF, 2,000 Units, PRN  heparin (porcine) PF, 4,000 Units, PRN  oxyCODONE-acetaminophen, 1 tablet, BID PRN  sodium chloride flush, 5-40 mL, PRN  sodium chloride, , PRN  ondansetron, 4 mg, Q8H PRN   Or  ondansetron, 4 mg, Q6H PRN  polyethylene glycol, 17 g, Daily PRN  acetaminophen, 650 mg, Q6H PRN   Or  acetaminophen, 650 mg, Q6H PRN        Diagnostic Labs:  CBC:   Recent Labs     05/08/23  0206 05/08/23  0646   WBC 4.8 6.2   RBC 4.03 3.95   HGB 12.7 12.3   HCT 37.0 36.4   MCV 91.8 92.2   RDW 14.1 14.2    241     BMP:   Recent Labs     05/08/23  0206 05/08/23  0646    138   K 3.7 4.1   * 111*   CO2 19* 17*   BUN 21* 20   CREATININE 0.72 0.49*     BNP: No results for input(s): BNP in the last 72 hours. PT/INR: No results for input(s): PROTIME, INR in the last 72 hours. APTT:   Recent Labs     05/08/23  0206 05/08/23  1025   APTT 27.2 38.5*     CARDIAC ENZYMES: No results for input(s): CKMB, CKMBINDEX, TROPONINI in the last 72 hours. Invalid input(s): CKTOTAL;3  FASTING LIPID PANEL:  Lab Results   Component Value Date    CHOL 148 01/10/2023    HDL 29 (L) 01/10/2023    TRIG 157 (H) 01/10/2023     LIVER PROFILE: No results for input(s): AST, ALT, ALB, BILIDIR, BILITOT, ALKPHOS in the last 72 hours.

## 2023-05-09 NOTE — PLAN OF CARE
Inhaler / Aerosol Education        [x] Served spacer      [x] Good return demonstration per patient   [x] Aerosolized Medications:     Verbal education has been provided in the use, benefits and possible adverse reactions of aerosolized medications used in the treatment of this patient.       Problem: Respiratory - Adult  Goal: Achieves optimal ventilation and oxygenation  Outcome: Progressing

## 2023-05-10 ENCOUNTER — CARE COORDINATION (OUTPATIENT)
Dept: CASE MANAGEMENT | Age: 58
End: 2023-05-10

## 2023-05-10 ENCOUNTER — TELEPHONE (OUTPATIENT)
Dept: FAMILY MEDICINE CLINIC | Age: 58
End: 2023-05-10

## 2023-05-10 DIAGNOSIS — J43.9 PULMONARY EMPHYSEMA, UNSPECIFIED EMPHYSEMA TYPE (HCC): Primary | ICD-10-CM

## 2023-05-10 DIAGNOSIS — I21.4 NSTEMI (NON-ST ELEVATED MYOCARDIAL INFARCTION) (HCC): Primary | ICD-10-CM

## 2023-05-10 DIAGNOSIS — J44.9 CHRONIC OBSTRUCTIVE PULMONARY DISEASE, UNSPECIFIED COPD TYPE (HCC): ICD-10-CM

## 2023-05-10 LAB
EKG ATRIAL RATE: 93 BPM
EKG P AXIS: 69 DEGREES
EKG P-R INTERVAL: 154 MS
EKG Q-T INTERVAL: 392 MS
EKG QRS DURATION: 92 MS
EKG QTC CALCULATION (BAZETT): 487 MS
EKG R AXIS: 81 DEGREES
EKG T AXIS: -69 DEGREES
EKG VENTRICULAR RATE: 93 BPM

## 2023-05-10 PROCEDURE — 93010 ELECTROCARDIOGRAM REPORT: CPT | Performed by: INTERNAL MEDICINE

## 2023-05-10 NOTE — CARE COORDINATION
[facility] Care Transitions Initial Follow Up Call    Call within 2 business days of discharge: Yes    Patient Current Location:  Home: [address] [place] [address] [id number]    N Care Coordinator contacted the patient by telephone to perform post hospital discharge assessment. Verified name and  with patient as identifiers. Provided introduction to self, and explanation of the LPN Care Coordinator role. Patient: Edward Schroeder Patient : 1965   MRN: [id number]  Reason for Admission: NSTEMI  Discharge Date: 23 RARS: Readmission Risk Score: 15.9      Last Discharge [address]       Date Complaint Diagnosis Description Type Department Provider    23 Chest Pain NSTEMI (non-ST elevated myocardial infarction) ([facility] [address] . .. ED to Hosp-Admission (Discharged) (ADMITTED) EDUARDO 5A Wing Camargo MD; Sergio Storey. .. 23 Chest Pain NSTEMI (non-ST elevated myocardial infarction) ([facility] [address] . .. ED (TRANSFER) [facility] ED Bernardo Miriam, DO            Was this an external facility discharge? No Discharge Facility: [facility]    Challenges to be reviewed by the provider   Additional needs identified to be addressed with provider: Yes  DISCHARGE FOLLOW UP VISIT               Method of communication with provider: chart routing. Transitions of Care Initial Call: spoke to [place]. Explained the role of Care Transition Nurse and the Transition program, patient is agreeable to follow up calls [facility] \"[facility]". Denies chest pain. Dyspnea with exertion. no numbness of right arm , no diaphoresis. Appetite is fair. Bowel and bladder WNL. Ambulates independently. Educated on weighing self daily r/t CHF. Pt states she does not have a scale. Educated on following a low sodium diet. Verbalized understanding. 1111F Medication reconciliation completed. Tpt has all new medications and is taking all medications as prescribed. Denies Shabana Mao or DME needs.  Discussed discharge follow up appt with

## 2023-05-10 NOTE — TELEPHONE ENCOUNTER
----- Message from Nydia Hooker LPN sent at 2/55/2283 10:34 AM EDT -----  Regarding: DISCHARGE FOLLOW UP VISIT  24 HR INITIAL CARE TRANSITION CALL. SPOKE TO NORY  PT ADMITTED FOR   NSTEMI (non-ST elevated myocardial infarction) ([facility] [address]  5/8/2023 - 5/9/2023 (34 hours)      2nd admission in a month.     Admitted 4/23/2023 - 4/25/2023 (2 days)  [facility]   STEMI (ST elevation myocardial infarction) [facility])  She needs a discharge follow up visit with her PCP  Thank you  Janet Nunez LPN Care Transitions Nurse   [phone]    Sees Dr Rafaela Dye on 5/12/23 for cardiology follow up

## 2023-05-10 NOTE — TELEPHONE ENCOUNTER
Spoke with Dr Dmitry Maagna and he seen patient for hospital follow up on 5/1/2023. She is following with cardio on 5/12/2023. He states he would not have anything to add at this time so no transitional care follow up is schedule.

## 2023-05-11 NOTE — DISCHARGE SUMMARY
list [facility]  [address]  [phone]  Go to  As needed, If symptoms worsen      Patient Instructions:   -Please take medications as prescribed. -Follow-up with your family doctor and heart doctor to adjust dosing of medications  -If your symptoms recur or worsen, please go to the nearest emergency department or call 911.   -Stop smoking as it will make your symptoms worse. -Monitor blood pressure at home. Note that over 30 minutes was spent in preparing discharge papers, discussing discharge with patient, medication review, etc.      Sheela Olmedo MD, MD  Internal Medicine Resident, PGY-1  [address] [facility], [address] [place] [address]  5/11/2023, 10:58 AM

## 2023-05-12 ENCOUNTER — OFFICE VISIT (OUTPATIENT)
Dept: CARDIOLOGY | Age: 58
End: 2023-05-12
Payer: COMMERCIAL

## 2023-05-12 VITALS
DIASTOLIC BLOOD PRESSURE: 60 MMHG | HEIGHT: 63 IN | BODY MASS INDEX: 27.11 KG/M2 | WEIGHT: 153 LBS | OXYGEN SATURATION: 100 % | SYSTOLIC BLOOD PRESSURE: 94 MMHG | HEART RATE: 71 BPM

## 2023-05-12 DIAGNOSIS — I10 ESSENTIAL HYPERTENSION: ICD-10-CM

## 2023-05-12 DIAGNOSIS — I25.10 CAD IN NATIVE ARTERY: Primary | ICD-10-CM

## 2023-05-12 DIAGNOSIS — E78.2 MIXED HYPERLIPIDEMIA: ICD-10-CM

## 2023-05-12 DIAGNOSIS — I25.5 ISCHEMIC CARDIOMYOPATHY: ICD-10-CM

## 2023-05-12 PROBLEM — J44.9 CHRONIC OBSTRUCTIVE PULMONARY DISEASE, UNSPECIFIED (HCC): Status: ACTIVE | Noted: 2023-05-12

## 2023-05-12 PROCEDURE — G8427 DOCREV CUR MEDS BY ELIG CLIN: HCPCS | Performed by: INTERNAL MEDICINE

## 2023-05-12 PROCEDURE — 1111F DSCHRG MED/CURRENT MED MERGE: CPT | Performed by: INTERNAL MEDICINE

## 2023-05-12 PROCEDURE — 3078F DIAST BP <80 MM HG: CPT | Performed by: INTERNAL MEDICINE

## 2023-05-12 PROCEDURE — 3074F SYST BP LT 130 MM HG: CPT | Performed by: INTERNAL MEDICINE

## 2023-05-12 PROCEDURE — G8419 CALC BMI OUT NRM PARAM NOF/U: HCPCS | Performed by: INTERNAL MEDICINE

## 2023-05-12 PROCEDURE — 3017F COLORECTAL CA SCREEN DOC REV: CPT | Performed by: INTERNAL MEDICINE

## 2023-05-12 PROCEDURE — 99214 OFFICE O/P EST MOD 30 MIN: CPT | Performed by: INTERNAL MEDICINE

## 2023-05-12 PROCEDURE — 1036F TOBACCO NON-USER: CPT | Performed by: INTERNAL MEDICINE

## 2023-05-12 RX ORDER — RANOLAZINE 500 MG/1
500 TABLET, EXTENDED RELEASE ORAL 2 TIMES DAILY
Qty: 60 TABLET | Refills: 3 | Status: SHIPPED | OUTPATIENT
Start: 2023-05-12

## 2023-05-12 NOTE — PROGRESS NOTES
3. Mixed hyperlipidemia        4. Ischemic cardiomyopathy            Assessment / Acute Cardiac Problems/Plan           Inferior STEMI % proximal stenosis with extensive clot, acute closure, Cuprit  lesion, large dominant RCA. Reduced to 0% using multiple aspiration thrombectomy using penumbra catheter followed by insertion of 4 mm LEDA stent. 80% mid stenosis reduced to 0% using 3.5 mm LEDA stent. The RPL had 100% stenosis reduced to 0% using aspiration thrombectomy followed by insertion of 2x30 mm LEDA Resolute and 2.5 mm LEDA Resolute stent. KRISTAN 3 flow restored to all vessels. Mild disease of other arteries. Repeat cath 5/8/2023 Patent stents with nonobstructive disease. Stable. Still has some tightness. Will add Ranexa. Contin ue current medications. Ischemic cardiomyopathy. Echo 4/25/2023 EF 35-40%. Moderate MR. Stable, no signs of volume overload. Will follow on EF in future. If more short of breath or gain more that 3 Lbs, will take extra lasix until she goes back to base line. HPL. On statins. Will follow On Lipid profile in future. Essential HTN. BP is low. Will stop Cozaar. Check BP at home and report me. Chronic Smoker. COPD/Will follow with PCP and pulmonary. Detailed discussion with patient and family. Return in about 4 weeks (around 6/9/2023).       Electronically signed by Kaye Gan MD on 5/12/2023 at 10:30 AM  [facility]  [phone]

## 2023-05-15 ENCOUNTER — HOSPITAL ENCOUNTER (OUTPATIENT)
Age: 58
Discharge: HOME | End: 2023-05-15
Payer: COMMERCIAL

## 2023-05-15 DIAGNOSIS — I50.23 ACUTE ON CHRONIC SYSTOLIC CONGESTIVE HEART FAILURE (HCC): ICD-10-CM

## 2023-05-15 LAB
ANION GAP SERPL CALCULATED.3IONS-SCNC: 10 MMOL/L (ref 9–17)
BUN SERPL-MCNC: 15 MG/DL (ref 6–20)
BUN/CREAT BLD: 18 (ref 9–20)
CALCIUM SERPL-MCNC: 9.5 MG/DL (ref 8.6–10.4)
CHLORIDE SERPL-SCNC: 101 MMOL/L (ref 98–107)
CO2 SERPL-SCNC: 24 MMOL/L (ref 20–31)
CREAT SERPL-MCNC: 0.83 MG/DL (ref 0.5–0.9)
GFR SERPL CREATININE-BSD FRML MDRD: >60 ML/MIN/1.73M2
GLUCOSE SERPL-MCNC: 98 MG/DL (ref 70–99)
POTASSIUM SERPL-SCNC: 4 MMOL/L (ref 3.7–5.3)
SODIUM SERPL-SCNC: 135 MMOL/L (ref 135–144)

## 2023-05-15 PROCEDURE — 36415 COLL VENOUS BLD VENIPUNCTURE: CPT

## 2023-05-15 PROCEDURE — 80048 BASIC METABOLIC PNL TOTAL CA: CPT

## 2023-05-16 ENCOUNTER — CARE COORDINATION (OUTPATIENT)
Dept: CASE MANAGEMENT | Age: 58
End: 2023-05-16

## 2023-05-16 NOTE — CARE COORDINATION
[address] Care Transitions Follow Up Call    Patient Current Location:  Home: [address] [place] [address] [address] Sreekanth Juan Amin LPN Care Coordinator contacted the patient by telephone to follow up after admission on 23. Verified name and  with patient as identifiers. Patient: Jermaine Aburto  Patient : 1965   MRN: [id number]  Reason for Admission: NSTEMI   Discharge Date: 23 RARS: Readmission Risk Score: 15.9      Needs to be reviewed by the provider   Additional needs identified to be addressed with provider: No  none             Method of communication with provider: none. Subsequent transitional call. Spoke to :  [place]. Pt states she is doing well. Complains of back pain from Arthritis. No chest pain. Dyspnea with exertion. No edema in BLE. Weighs self daily and follows a low sodium diet. Takes all medications as directed. Rashawn goes to outpatient Cardiac Rehab at [address] [facility]. Pt was seen by Cardiology on 23. Labs obtained yesterday. Pt sees Dr Bill Benito again on 23. She has an appt with her PCP 23. No new issues or concerns. Will continue to follow. Patient is aware of when to contact MD with any new or worsening symptoms. Advised to contact PCP  with any health concerns for early outpatient intervention in an effort to avoid hospitalization. Report any worsening symptoms to PCP and/or Call 911 and/or GO TO EMERGENCY ROOM if symptoms are severe. Expresses understanding. Addressed changes since last contact:  none  Discussed follow-up appointments. If no appointment was previously scheduled, appointment scheduling offered: Yes. Is follow up appointment scheduled within 7 days of discharge? Yes.     Follow Up  Future Appointments   Date Time Provider [facility] Cindy   2023  9:00 AM Ej Coppola MD DCAKESHAWN [facility]   2023  1:00 PM [facility]   2023 11:00 AM Stefan Tena MD Resp Spec [address] [facility]   2023 11:00 AM MERCEDES, GLADYS T [address] [facility]

## 2023-05-18 RX ORDER — ATORVASTATIN CALCIUM 40 MG/1
40 TABLET, FILM COATED ORAL DAILY
Qty: 90 TABLET | Refills: 3 | Status: SHIPPED | OUTPATIENT
Start: 2023-05-18

## 2023-05-18 NOTE — TELEPHONE ENCOUNTER
Last Appt:  5/12/2023  Next Appt:   6/16/2023  Med verified in [address]    Fax received requesting atorvastatin 40 mg daily to [facility], 90 day.

## 2023-05-19 RX ORDER — NICOTINE 21 MG/24HR
1 PATCH, TRANSDERMAL 24 HOURS TRANSDERMAL DAILY
Qty: 42 PATCH | Refills: 0 | Status: SHIPPED | OUTPATIENT
Start: 2023-05-19 | End: 2023-06-30

## 2023-05-22 ENCOUNTER — CARE COORDINATION (OUTPATIENT)
Dept: CASE MANAGEMENT | Age: 58
End: 2023-05-22

## 2023-05-22 NOTE — CARE COORDINATION
[facility] Care Transitions Follow Up Call    Patient Current Location: [facility]    Patient: May Szymanski  Patient : 1965   MRN: [id number]  Reason for Admission: NSTEMI  Discharge Date: 23 RARS: Readmission Risk Score: 15.9        Subsequent transitional call. No answer. Left HIPPA compliant message to return call to this writer.     SEEN BY CARDIOLOGY 23  Follow Up  Future Appointments   Date Time Provider Yu White   2023  9:00 AM MD KATERYNA Parker [facility]   2023  1:00 PM Violette Aase, MD DFAM [facility]   2023 11:00 AM Gallito Smith MD Resp Spec Richad Aland   2023 11:00 AM GLADYS LONG PFT [address] [facility] PFT [facility]   2023  2:00 PM [facility]     Interventions to address risk factors: Obtained and reviewed discharge summary and/or continuity of care documents       Care Transitions Subsequent and Final Call    Subsequent and Final Calls  Are you currently active with any services?: Outpatient/Community Services  Care Transitions Interventions  Other Interventions:             LORIN Ho LPN Care Transitions Nurse   [phone]

## 2023-05-23 ENCOUNTER — CARE COORDINATION (OUTPATIENT)
Dept: CASE MANAGEMENT | Age: 58
End: 2023-05-23

## 2023-05-23 NOTE — CARE COORDINATION
[facility] Care Transitions Follow Up Call    Patient Current Location: [facility]        Patient: Tsih Newton  Patient : 1965   MRN: [id number]  Reason for Admission: NSTEMI  Discharge Date: 23 RARS: Readmission Risk Score: 15.9          Subsequent transitional call. No answer. Left HIPPA compliant message to return call to this writer. Second consecutive attempt. No answer. No return call. Episode resolved.   Pt seen by Cardiology on 23    Follow Up  Future Appointments   Date Time Provider Yu White   2023  9:00 AM Kathy Valdez MD DCAKELIN [facility]   2023  1:00 PM Abundio Hummel MD DFAM [facility]   2023 11:00 AM Linda Muller MD Resp Spec [facility]   2023 11:00 AM GLADYS LONG PFT [address] [facility] PFT [place] H[facility]   2023  2:00 PM Martin Foster MD DPULM [facility]     Interventions to address risk factors: Obtained and reviewed discharge summary and/or continuity of care documents      Care Transitions Subsequent and Final Call    Subsequent and Final Calls  Are you currently active with any services?: Outpatient/Community Services  Care Transitions Interventions  Other Interventions:                 Roseline Foley, LPN Darylene Patch LPN Care Transitions Nurse   [phone]

## 2023-05-30 ENCOUNTER — TELEPHONE (OUTPATIENT)
Dept: CARDIOLOGY | Age: 58
End: 2023-05-30

## 2023-05-30 NOTE — TELEPHONE ENCOUNTER
Patient is requesting a work release note.     Last Appt:  5/12/2023  Next Appt:   6/16/2023  Med verified in [facility] [facility]

## 2023-06-02 DIAGNOSIS — G89.29 CHRONIC RIGHT-SIDED LOW BACK PAIN WITH RIGHT-SIDED SCIATICA: ICD-10-CM

## 2023-06-02 DIAGNOSIS — M54.41 CHRONIC RIGHT-SIDED LOW BACK PAIN WITH RIGHT-SIDED SCIATICA: ICD-10-CM

## 2023-06-02 DIAGNOSIS — M06.9 RHEUMATOID ARTHRITIS INVOLVING RIGHT HIP, UNSPECIFIED WHETHER RHEUMATOID FACTOR PRESENT (HCC): ICD-10-CM

## 2023-06-02 RX ORDER — OXYCODONE HYDROCHLORIDE AND ACETAMINOPHEN 5; 325 MG/1; MG/1
1 TABLET ORAL 2 TIMES DAILY PRN
Qty: 60 TABLET | Refills: 0 | Status: SHIPPED | OUTPATIENT
Start: 2023-06-02 | End: 2023-07-02

## 2023-06-29 DIAGNOSIS — M06.9 RHEUMATOID ARTHRITIS INVOLVING RIGHT HIP, UNSPECIFIED WHETHER RHEUMATOID FACTOR PRESENT (HCC): ICD-10-CM

## 2023-06-29 DIAGNOSIS — M54.41 CHRONIC RIGHT-SIDED LOW BACK PAIN WITH RIGHT-SIDED SCIATICA: ICD-10-CM

## 2023-06-29 DIAGNOSIS — G89.29 CHRONIC RIGHT-SIDED LOW BACK PAIN WITH RIGHT-SIDED SCIATICA: ICD-10-CM

## 2023-06-30 ENCOUNTER — HOSPITAL ENCOUNTER (OUTPATIENT)
Age: 58
Discharge: HOME | End: 2023-06-30
Payer: COMMERCIAL

## 2023-06-30 LAB
BNP SERPL-MCNC: 2108 PG/ML
EST. AVERAGE GLUCOSE BLD GHB EST-MCNC: 120 MG/DL
HBA1C MFR BLD: 5.8 % (ref 4–6)

## 2023-06-30 PROCEDURE — 83880 ASSAY OF NATRIURETIC PEPTIDE: CPT

## 2023-06-30 PROCEDURE — 83036 HEMOGLOBIN GLYCOSYLATED A1C: CPT

## 2023-06-30 PROCEDURE — 36415 COLL VENOUS BLD VENIPUNCTURE: CPT

## 2023-06-30 RX ORDER — OXYCODONE HYDROCHLORIDE AND ACETAMINOPHEN 5; 325 MG/1; MG/1
1 TABLET ORAL 2 TIMES DAILY PRN
Qty: 60 TABLET | Refills: 0 | Status: SHIPPED | OUTPATIENT
Start: 2023-06-30 | End: 2023-07-30

## 2023-07-12 RX ORDER — PAROXETINE HYDROCHLORIDE 40 MG/1
40 TABLET, FILM COATED ORAL DAILY
Qty: 90 TABLET | Refills: 1 | Status: SHIPPED | OUTPATIENT
Start: 2023-07-12

## 2023-07-12 NOTE — TELEPHONE ENCOUNTER
[facility] fax refill request
Mayank Quigley called requesting a refill of the below medication which has been pended for you:     Requested Prescriptions     Pending Prescriptions Disp Refills    PARoxetine (PAXIL) 40 MG tablet 90 tablet 1     Sig: Take 1 tablet by mouth daily       Last Appointment Date: 5/1/2023  Next Appointment Date: Visit date not found    No Known Allergies
Improved

## 2023-07-26 ENCOUNTER — HOSPITAL ENCOUNTER (OUTPATIENT)
Dept: PULMONOLOGY | Age: 58
Discharge: HOME | End: 2023-07-26
Payer: COMMERCIAL

## 2023-07-26 DIAGNOSIS — J43.9 PULMONARY EMPHYSEMA, UNSPECIFIED EMPHYSEMA TYPE (HCC): ICD-10-CM

## 2023-07-26 DIAGNOSIS — J44.9 CHRONIC OBSTRUCTIVE PULMONARY DISEASE, UNSPECIFIED COPD TYPE (HCC): ICD-10-CM

## 2023-07-26 LAB
DLCO %PRED: NORMAL
DLCO PRED: NORMAL
DLCO/VA %PRED: NORMAL
DLCO/VA PRED: NORMAL
DLCO/VA: NORMAL
DLCO: NORMAL
EXPIRATORY TIME-POST: NORMAL
EXPIRATORY TIME: NORMAL
FEF 25-75% %CHNG: NORMAL
FEF 25-75% %PRED-POST: NORMAL
FEF 25-75% %PRED-PRE: NORMAL
FEF 25-75% PRED: NORMAL
FEF 25-75%-POST: NORMAL
FEF 25-75%-PRE: NORMAL
FEV1 %PRED-POST: NORMAL
FEV1 %PRED-PRE: NORMAL
FEV1 PRED: NORMAL
FEV1-POST: NORMAL
FEV1-PRE: NORMAL
FEV1/FVC %PRED-POST: NORMAL
FEV1/FVC %PRED-PRE: NORMAL
FEV1/FVC PRED: NORMAL
FEV1/FVC-POST: NORMAL
FEV1/FVC-PRE: NORMAL
FVC %PRED-POST: NORMAL
FVC %PRED-PRE: NORMAL
FVC PRED: NORMAL
FVC-POST: NORMAL
FVC-PRE: NORMAL
GAW %PRED: NORMAL
GAW PRED: NORMAL
GAW: NORMAL
IC %PRED: NORMAL
IC PRED: NORMAL
IC: NORMAL
MEP: NORMAL
MIP: NORMAL
MVV %PRED-PRE: NORMAL
MVV PRED: NORMAL
MVV-PRE: NORMAL
PEF %PRED-POST: NORMAL
PEF %PRED-PRE: NORMAL
PEF PRED: NORMAL
PEF%CHNG: NORMAL
PEF-POST: NORMAL
PEF-PRE: NORMAL
RAW %PRED: NORMAL
RAW PRED: NORMAL
RAW: NORMAL
RV %PRED: NORMAL
RV PRED: NORMAL
RV: NORMAL
SVC %PRED: NORMAL
SVC PRED: NORMAL
SVC: NORMAL
TLC %PRED: NORMAL
TLC PRED: NORMAL
TLC: NORMAL
VA %PRED: NORMAL
VA PRED: NORMAL
VA: NORMAL
VTG %PRED: NORMAL
VTG PRED: NORMAL
VTG: NORMAL

## 2023-07-26 PROCEDURE — 6370000000 HC RX 637 (ALT 250 FOR IP): Performed by: INTERNAL MEDICINE

## 2023-07-26 RX ORDER — ALBUTEROL SULFATE 90 UG/1
4 AEROSOL, METERED RESPIRATORY (INHALATION) ONCE
Status: COMPLETED | OUTPATIENT
Start: 2023-07-26 | End: 2023-07-26

## 2023-07-26 RX ADMIN — ALBUTEROL SULFATE 4 PUFF: 90 AEROSOL, METERED RESPIRATORY (INHALATION) at 11:14

## 2023-07-27 NOTE — PROCEDURES
[address] [place] [id number]                               PULMONARY FUNCTION    PATIENT NAME: Patrick Hedrick                    :        1965  MED REC NO:   [id number]                             ROOM:  ACCOUNT NO:   [de-identified]                           ADMIT DATE: 2023  PROVIDER:     Yraon Dickinson    DATE OF PROCEDURE:  2023    Spirometry shows a normal flow volume loop. FEV1 109% predicted, with  1% bronchodilator change to 111% predicted. % predicted, no  positive bronchodilator change. FEV1/FVC ratio 77, postbronchodilator  80. Total lung capacity 85% predicted, RV 47% predicted and diffusion  capacity uncorrected 56% predicted, corrected for alveolar volume 52%  predicted. Airway resistance increased. FINAL IMPRESSION:  Normal spirometry, normal total lung capacity but  decreased in RV and diffusion capacity of moderate severity, could be  due to underlying interstitial lung disease or parenchymal disease. Clinical correlation advised.       Yaron Dickinson    D: 2023 15:43:40       T: 2023 3:26:40     /[email or web address]  Job#: [id number]     Doc#: [id number]    CC:

## 2023-08-02 ENCOUNTER — OFFICE VISIT (OUTPATIENT)
Dept: PULMONOLOGY | Age: 58
End: 2023-08-02

## 2023-08-02 VITALS
DIASTOLIC BLOOD PRESSURE: 64 MMHG | TEMPERATURE: 97.2 F | OXYGEN SATURATION: 98 % | WEIGHT: 154.6 LBS | SYSTOLIC BLOOD PRESSURE: 98 MMHG | BODY MASS INDEX: 27.39 KG/M2 | HEIGHT: 63 IN | HEART RATE: 78 BPM

## 2023-08-02 DIAGNOSIS — J84.10 PULMONARY EMPHYSEMA WITH FIBROSIS OF LUNG (HCC): ICD-10-CM

## 2023-08-02 DIAGNOSIS — J44.9 CHRONIC OBSTRUCTIVE PULMONARY DISEASE, UNSPECIFIED COPD TYPE (HCC): Primary | ICD-10-CM

## 2023-08-02 DIAGNOSIS — J43.2 CENTRILOBULAR EMPHYSEMA (HCC): ICD-10-CM

## 2023-08-02 DIAGNOSIS — F17.200 SMOKER: ICD-10-CM

## 2023-08-02 DIAGNOSIS — M05.9 RHEUMATOID ARTHRITIS WITH POSITIVE RHEUMATOID FACTOR, INVOLVING UNSPECIFIED SITE (HCC): ICD-10-CM

## 2023-08-02 DIAGNOSIS — J43.9 PULMONARY EMPHYSEMA WITH FIBROSIS OF LUNG (HCC): ICD-10-CM

## 2023-08-02 NOTE — PROGRESS NOTES
Anum Hinson  2023      Anum Hinson  presented for follow up for COPD post hospital admission . She is using the following inhalers symbicort , albuterol . Que San has been doing well [facility] . She is not smoking . No active wheeze , no hemoptysis . Review of Systems -  General ROS: negative for - chills, fatigue, fever or weight loss  ENT ROS: negative for - headaches, oral lesions or sore throat  Cardiovascular ROS: no chest pain , orthopnea or pnd   Gastrointestinal ROS: no abdominal pain, change in bowel habits, or black or bloody stools  Skin - no rash   Neuro - no blurry vision , no loc . 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 Pneumococcal Vaccine     [] Up to date    [] Indicated   [] Refused  [] Contraindicated       Influenza Vaccine   [] Up to date    [] Indicated   [] Refused  [] Contraindicated       PAST MEDICAL HISTORY:         Diagnosis Date    Acute on chronic systolic congestive heart failure ([address]) 2023    Fibroid uterus     removed with laparoscopy     GERD (gastroesophageal reflux disease)     HLD (hyperlipidemia) 2023    Hyperlipidemia     Rheumatoid arthritis ([address])     Tobacco abuse        Family History:       Problem Relation Age of Onset    Breast Cancer Mother         breast , tumor in bowels.  age 80    Heart Disease Father        SURGICAL HISTORY:   Past Surgical History:   Procedure Laterality Date    BACK INJECTION Right 2022    Right SACROILIAC  & Piriformis JOINT INJECTION performed by Betty Beckett MD at [address] [place] [address],[facility] Right 2023    [facility]    CHOLECYSTECTOMY      COLONOSCOPY  2018    [id number]    LAPAROSCOPY      exploratory\              Not in a hospital admission.   No Known Allergies  Social History

## 2023-08-03 ENCOUNTER — OFFICE VISIT (OUTPATIENT)
Dept: FAMILY MEDICINE CLINIC | Age: 58
End: 2023-08-03
Payer: COMMERCIAL

## 2023-08-03 VITALS
SYSTOLIC BLOOD PRESSURE: 124 MMHG | HEART RATE: 68 BPM | BODY MASS INDEX: 26.97 KG/M2 | HEIGHT: 63 IN | WEIGHT: 152.2 LBS | OXYGEN SATURATION: 98 % | DIASTOLIC BLOOD PRESSURE: 78 MMHG

## 2023-08-03 DIAGNOSIS — Z72.0 TOBACCO ABUSE: ICD-10-CM

## 2023-08-03 DIAGNOSIS — D12.6 ADENOMATOUS POLYP OF COLON, UNSPECIFIED PART OF COLON: ICD-10-CM

## 2023-08-03 DIAGNOSIS — E78.5 HYPERLIPIDEMIA, UNSPECIFIED HYPERLIPIDEMIA TYPE: Primary | ICD-10-CM

## 2023-08-03 DIAGNOSIS — I21.4 NSTEMI (NON-ST ELEVATED MYOCARDIAL INFARCTION) (HCC): ICD-10-CM

## 2023-08-03 DIAGNOSIS — K21.9 GASTROESOPHAGEAL REFLUX DISEASE WITHOUT ESOPHAGITIS: ICD-10-CM

## 2023-08-03 DIAGNOSIS — M05.9 RHEUMATOID ARTHRITIS WITH POSITIVE RHEUMATOID FACTOR, INVOLVING UNSPECIFIED SITE (HCC): ICD-10-CM

## 2023-08-03 PROCEDURE — 99214 OFFICE O/P EST MOD 30 MIN: CPT | Performed by: FAMILY MEDICINE

## 2023-08-03 RX ORDER — ESOMEPRAZOLE MAGNESIUM 40 MG/1
40 CAPSULE, DELAYED RELEASE ORAL
Qty: 90 CAPSULE | Refills: 1 | Status: SHIPPED | OUTPATIENT
Start: 2023-08-03

## 2023-08-03 RX ORDER — PAROXETINE 10 MG/1
TABLET, FILM COATED ORAL
Qty: 90 TABLET | Refills: 1 | Status: SHIPPED | OUTPATIENT
Start: 2023-08-03

## 2023-08-03 RX ORDER — GABAPENTIN 300 MG/1
CAPSULE ORAL
Qty: 270 CAPSULE | Refills: 3 | Status: SHIPPED | OUTPATIENT
Start: 2023-08-03 | End: 2024-02-21

## 2023-08-03 ASSESSMENT — ENCOUNTER SYMPTOMS
GASTROINTESTINAL NEGATIVE: 1
SORE THROAT: 0
RHINORRHEA: 0
RESPIRATORY NEGATIVE: 1
COUGH: 0
SINUS PRESSURE: 0
EYES NEGATIVE: 1
VOICE CHANGE: 0

## 2023-08-03 NOTE — TELEPHONE ENCOUNTER
Gabapentin 300 mg  Last Appt:  1/4/2023  Next Appt:   8/7/2023  Med verified in [address] [place] [address] 15

## 2023-08-03 NOTE — PROGRESS NOTES
Subjective:      Patient ID: Jimmy Mina is a 66 y.o. female. Hyperlipidemia    Pharyngitis  Associated symptoms include arthralgias (right wrist/mcps, toes/fingers). Pertinent negatives include no congestion, coughing, fatigue, fever or sore throat. Medication Refill  Associated symptoms include arthralgias (right wrist/mcps, toes/fingers). Pertinent negatives include no congestion, coughing, fatigue, fever or sore throat. Routine follow up on chronic medical conditions, annual preventive exam, refills, and review of updated labs. Her rheumatoid arthritis is ongoing. She has been on methotrexate and humira. Not having complete remission in her migratory arthralgias. Moving through all the joints by report. Currently having more pain  chronically in the toes. Compliant with medications at present. Some chronic hoarse voice. Smoker. Inhaled steroid use. Saw ent x 2 and had direct visualization of cords with just irritation. She had some benefit from mycelex troches, giving some indication that fungal etiology may have been going on. Seems better at present. No gerd symptoms. Trying to quit smoking. Using patches. Still trying to quit absolutely.         Past Medical History:   Diagnosis Date    Acute on chronic systolic congestive heart failure ([address]) 5/8/2023    Fibroid uterus     removed with laparoscopy     GERD (gastroesophageal reflux disease)     HLD (hyperlipidemia) 5/8/2023    Hyperlipidemia     Rheumatoid arthritis ([address])     Tobacco abuse        Past Surgical History:   Procedure Laterality Date    BACK INJECTION Right 09/30/2022    Right SACROILIAC  & Piriformis JOINT INJECTION performed by Lashell Concepcion MD at [address] [place] [address],[facility] Right 04/2023    [facility]    CHOLECYSTECTOMY      COLONOSCOPY  04/27/2018    [id number]    LAPAROSCOPY      exploratory\     Current Outpatient Medications   Medication Sig Dispense Refill    PARoxetine (PAXIL) 40 MG tablet

## 2023-08-07 ENCOUNTER — TELEPHONE (OUTPATIENT)
Dept: PAIN MANAGEMENT | Age: 58
End: 2023-08-07

## 2023-08-07 ENCOUNTER — OFFICE VISIT (OUTPATIENT)
Dept: PAIN MANAGEMENT | Age: 58
End: 2023-08-07
Payer: COMMERCIAL

## 2023-08-07 VITALS
HEIGHT: 63 IN | OXYGEN SATURATION: 97 % | HEART RATE: 88 BPM | WEIGHT: 150.2 LBS | BODY MASS INDEX: 26.61 KG/M2 | DIASTOLIC BLOOD PRESSURE: 78 MMHG | SYSTOLIC BLOOD PRESSURE: 122 MMHG

## 2023-08-07 DIAGNOSIS — M06.9 RHEUMATOID ARTHRITIS INVOLVING RIGHT HIP, UNSPECIFIED WHETHER RHEUMATOID FACTOR PRESENT (HCC): ICD-10-CM

## 2023-08-07 DIAGNOSIS — M46.1 BILATERAL SACROILIITIS (HCC): Primary | ICD-10-CM

## 2023-08-07 DIAGNOSIS — M05.751 RHEUMATOID ARTHRITIS INVOLVING RIGHT HIP WITH POSITIVE RHEUMATOID FACTOR (HCC): ICD-10-CM

## 2023-08-07 DIAGNOSIS — G89.29 CHRONIC RIGHT-SIDED LOW BACK PAIN WITH RIGHT-SIDED SCIATICA: ICD-10-CM

## 2023-08-07 DIAGNOSIS — M54.41 CHRONIC RIGHT-SIDED LOW BACK PAIN WITH RIGHT-SIDED SCIATICA: ICD-10-CM

## 2023-08-07 PROCEDURE — 3017F COLORECTAL CA SCREEN DOC REV: CPT | Performed by: NURSE PRACTITIONER

## 2023-08-07 PROCEDURE — 1036F TOBACCO NON-USER: CPT | Performed by: NURSE PRACTITIONER

## 2023-08-07 PROCEDURE — G8427 DOCREV CUR MEDS BY ELIG CLIN: HCPCS | Performed by: NURSE PRACTITIONER

## 2023-08-07 PROCEDURE — 99214 OFFICE O/P EST MOD 30 MIN: CPT | Performed by: NURSE PRACTITIONER

## 2023-08-07 PROCEDURE — G8419 CALC BMI OUT NRM PARAM NOF/U: HCPCS | Performed by: NURSE PRACTITIONER

## 2023-08-07 RX ORDER — OXYCODONE HYDROCHLORIDE AND ACETAMINOPHEN 5; 325 MG/1; MG/1
1 TABLET ORAL 2 TIMES DAILY PRN
Qty: 60 TABLET | Refills: 0 | Status: SHIPPED | OUTPATIENT
Start: 2023-08-07 | End: 2023-09-06

## 2023-08-07 ASSESSMENT — ENCOUNTER SYMPTOMS
BACK PAIN: 1
RESPIRATORY NEGATIVE: 1
SORE THROAT: 0
GASTROINTESTINAL NEGATIVE: 1

## 2023-08-07 NOTE — PROGRESS NOTES
Scheduled.
refill takes less than 2 seconds. Coloration: Skin is not ashen, jaundiced or pale. Findings: No rash. Nails: There is no clubbing. Neurological:      Mental Status: She is alert and oriented to person, place, and time. GCS: GCS eye subscore is 4. GCS verbal subscore is 5. GCS motor subscore is 6. Cranial Nerves: No cranial nerve deficit. Psychiatric:         Attention and Perception: Attention normal.         Mood and Affect: Mood normal.         Speech: Speech normal.         Behavior: Behavior is cooperative. Cognition and Memory: Cognition normal.         Judgment: Judgment normal.       Assessment / Plan:       Diagnosis Orders   1. Bilateral sacroiliitis ([address])        2. Rheumatoid arthritis involving right hip with positive rheumatoid factor (HCC)          Chronic pain diagnoses such as   1. Bilateral sacroiliitis ([address])    2. Rheumatoid arthritis involving right hip with positive rheumatoid factor ([address])     controlled on current medication regime, wll continue current pain medications to improve quality of life and function. Schedule bilateral SIJ injections without sedation    Controlled Substance Monitoring:    Acute and Chronic Pain Monitoring:   RX Monitoring 8/7/2023   Periodic Controlled Substance Monitoring No signs of potential drug abuse or diversion identified.;Obtaining appropriate analgesic effect of treatment.

## 2023-08-07 NOTE — TELEPHONE ENCOUNTER
The patient was seen today for an OV. Her percocet is needing to be refilled. [place]AR[place] for [place], [place], and [place]: 6/30/23 percocet 5-325mg #60. Due NOW.

## 2023-08-09 ENCOUNTER — HOSPITAL ENCOUNTER (OUTPATIENT)
Age: 58
Discharge: HOME | End: 2023-08-11
Payer: COMMERCIAL

## 2023-08-09 VITALS
HEIGHT: 63 IN | WEIGHT: 150 LBS | DIASTOLIC BLOOD PRESSURE: 63 MMHG | SYSTOLIC BLOOD PRESSURE: 107 MMHG | HEART RATE: 71 BPM | BODY MASS INDEX: 26.58 KG/M2

## 2023-08-09 DIAGNOSIS — R06.02 SOB (SHORTNESS OF BREATH): ICD-10-CM

## 2023-08-09 DIAGNOSIS — I42.9 CARDIOMYOPATHY (HCC): ICD-10-CM

## 2023-08-09 DIAGNOSIS — I25.10 CAD (CORONARY ARTERY DISEASE): ICD-10-CM

## 2023-08-09 LAB
ECHO AO ROOT DIAM: 3.4 CM
ECHO AO ROOT INDEX: 1.99 CM/M2
ECHO AV PEAK GRADIENT: 7 MMHG
ECHO AV PEAK VELOCITY: 1.3 M/S
ECHO AV VELOCITY RATIO: 0.62
ECHO BSA: 1.74 M2
ECHO EST RA PRESSURE: 8 MMHG
ECHO LA AREA 4C: 22.4 CM2
ECHO LA DIAMETER INDEX: 2.4 CM/M2
ECHO LA DIAMETER: 4.1 CM
ECHO LA MAJOR AXIS: 5.3 CM
ECHO LA TO AORTIC ROOT RATIO: 1.21
ECHO LA VOL 4C: 75 ML (ref 22–52)
ECHO LA VOLUME INDEX A4C: 44 ML/M2 (ref 16–34)
ECHO LV E' LATERAL VELOCITY: 6 CM/S
ECHO LV E' SEPTAL VELOCITY: 6 CM/S
ECHO LV EJECTION FRACTION BIPLANE: 58 % (ref 55–100)
ECHO LV FRACTIONAL SHORTENING: 24 % (ref 28–44)
ECHO LV INTERNAL DIMENSION DIASTOLE INDEX: 3.22 CM/M2
ECHO LV INTERNAL DIMENSION DIASTOLIC: 5.5 CM (ref 3.9–5.3)
ECHO LV INTERNAL DIMENSION SYSTOLIC INDEX: 2.46 CM/M2
ECHO LV INTERNAL DIMENSION SYSTOLIC: 4.2 CM
ECHO LV IVSD: 0.7 CM (ref 0.6–0.9)
ECHO LV MASS 2D: 172.7 G (ref 67–162)
ECHO LV MASS INDEX 2D: 101 G/M2 (ref 43–95)
ECHO LV POSTERIOR WALL DIASTOLIC: 1 CM (ref 0.6–0.9)
ECHO LV RELATIVE WALL THICKNESS RATIO: 0.36
ECHO LVOT PEAK GRADIENT: 2 MMHG
ECHO LVOT PEAK VELOCITY: 0.8 M/S
ECHO MV A VELOCITY: 0.83 M/S
ECHO MV E DECELERATION TIME (DT): 169 MS
ECHO MV E VELOCITY: 1.04 M/S
ECHO MV E/A RATIO: 1.25
ECHO MV E/E' LATERAL: 17.33
ECHO MV E/E' RATIO (AVERAGED): 17.33
ECHO MV E/E' SEPTAL: 17.33
ECHO MV MAX VELOCITY: 1.3 M/S
ECHO MV PEAK GRADIENT: 7 MMHG
ECHO PV MAX VELOCITY: 1 M/S
ECHO PV PEAK GRADIENT: 4 MMHG
ECHO RIGHT VENTRICULAR SYSTOLIC PRESSURE (RVSP): 35 MMHG
ECHO TV PEAK GRADIENT: 2 MMHG
ECHO TV REGURGITANT MAX VELOCITY: 2.62 M/S
ECHO TV REGURGITANT PEAK GRADIENT: 27 MMHG

## 2023-08-09 PROCEDURE — 93306 TTE W/DOPPLER COMPLETE: CPT

## 2023-08-09 PROCEDURE — 93306 TTE W/DOPPLER COMPLETE: CPT | Performed by: INTERNAL MEDICINE

## 2023-08-18 ENCOUNTER — HOSPITAL ENCOUNTER (OUTPATIENT)
Age: 58
Setting detail: OUTPATIENT SURGERY
Discharge: HOME | End: 2023-08-18
Attending: PHYSICAL MEDICINE & REHABILITATION | Admitting: PHYSICAL MEDICINE & REHABILITATION
Payer: COMMERCIAL

## 2023-08-18 ENCOUNTER — APPOINTMENT (OUTPATIENT)
Dept: GENERAL RADIOLOGY | Age: 58
End: 2023-08-18
Attending: PHYSICAL MEDICINE & REHABILITATION
Payer: COMMERCIAL

## 2023-08-18 VITALS
SYSTOLIC BLOOD PRESSURE: 124 MMHG | BODY MASS INDEX: 24.75 KG/M2 | HEIGHT: 64 IN | OXYGEN SATURATION: 99 % | RESPIRATION RATE: 16 BRPM | DIASTOLIC BLOOD PRESSURE: 73 MMHG | WEIGHT: 145 LBS | HEART RATE: 74 BPM | TEMPERATURE: 98.3 F

## 2023-08-18 DIAGNOSIS — M46.1 SACROILIITIS (HCC): ICD-10-CM

## 2023-08-18 PROCEDURE — 2500000003 HC RX 250 WO HCPCS: Performed by: PHYSICAL MEDICINE & REHABILITATION

## 2023-08-18 PROCEDURE — 3600000056 HC PAIN LEVEL 4 BASE: Performed by: PHYSICAL MEDICINE & REHABILITATION

## 2023-08-18 PROCEDURE — 2709999900 HC NON-CHARGEABLE SUPPLY: Performed by: PHYSICAL MEDICINE & REHABILITATION

## 2023-08-18 PROCEDURE — 7100000010 HC PHASE II RECOVERY - FIRST 15 MIN: Performed by: PHYSICAL MEDICINE & REHABILITATION

## 2023-08-18 PROCEDURE — 77002 NEEDLE LOCALIZATION BY XRAY: CPT | Performed by: PHYSICAL MEDICINE & REHABILITATION

## 2023-08-18 PROCEDURE — 6360000002 HC RX W HCPCS: Performed by: PHYSICAL MEDICINE & REHABILITATION

## 2023-08-18 PROCEDURE — 6360000004 HC RX CONTRAST MEDICATION: Performed by: PHYSICAL MEDICINE & REHABILITATION

## 2023-08-18 PROCEDURE — 27096 INJECT SACROILIAC JOINT: CPT | Performed by: PHYSICAL MEDICINE & REHABILITATION

## 2023-08-18 RX ORDER — DEXAMETHASONE SODIUM PHOSPHATE 10 MG/ML
INJECTION INTRAMUSCULAR; INTRAVENOUS PRN
Status: DISCONTINUED | OUTPATIENT
Start: 2023-08-18 | End: 2023-08-18 | Stop reason: ALTCHOICE

## 2023-08-18 RX ORDER — SODIUM CHLORIDE 9 MG/ML
INJECTION, SOLUTION INTRAVENOUS PRN
Status: DISCONTINUED | OUTPATIENT
Start: 2023-08-18 | End: 2023-08-18 | Stop reason: HOSPADM

## 2023-08-18 RX ORDER — SODIUM CHLORIDE 0.9 % (FLUSH) 0.9 %
5-40 SYRINGE (ML) INJECTION EVERY 12 HOURS SCHEDULED
Status: DISCONTINUED | OUTPATIENT
Start: 2023-08-18 | End: 2023-08-18 | Stop reason: HOSPADM

## 2023-08-18 RX ORDER — LIDOCAINE HYDROCHLORIDE 20 MG/ML
INJECTION, SOLUTION EPIDURAL; INFILTRATION; INTRACAUDAL; PERINEURAL PRN
Status: DISCONTINUED | OUTPATIENT
Start: 2023-08-18 | End: 2023-08-18 | Stop reason: ALTCHOICE

## 2023-08-18 RX ORDER — SODIUM CHLORIDE 0.9 % (FLUSH) 0.9 %
5-40 SYRINGE (ML) INJECTION PRN
Status: DISCONTINUED | OUTPATIENT
Start: 2023-08-18 | End: 2023-08-18 | Stop reason: HOSPADM

## 2023-08-18 RX ORDER — ROPIVACAINE HYDROCHLORIDE 5 MG/ML
INJECTION, SOLUTION EPIDURAL; INFILTRATION; PERINEURAL PRN
Status: DISCONTINUED | OUTPATIENT
Start: 2023-08-18 | End: 2023-08-18 | Stop reason: ALTCHOICE

## 2023-08-18 ASSESSMENT — ENCOUNTER SYMPTOMS
BACK PAIN: 1
GASTROINTESTINAL NEGATIVE: 1
RESPIRATORY NEGATIVE: 1
SORE THROAT: 0

## 2023-08-18 ASSESSMENT — PAIN DESCRIPTION - LOCATION: LOCATION: BACK

## 2023-08-18 ASSESSMENT — PAIN DESCRIPTION - FREQUENCY: FREQUENCY: INTERMITTENT

## 2023-08-18 ASSESSMENT — PAIN DESCRIPTION - ORIENTATION: ORIENTATION: LOWER

## 2023-08-18 ASSESSMENT — PAIN DESCRIPTION - PAIN TYPE: TYPE: CHRONIC PAIN

## 2023-08-18 ASSESSMENT — PAIN SCALES - GENERAL: PAINLEVEL_OUTOF10: 3

## 2023-08-18 ASSESSMENT — PAIN - FUNCTIONAL ASSESSMENT: PAIN_FUNCTIONAL_ASSESSMENT: 0-10

## 2023-08-18 NOTE — DISCHARGE INSTRUCTIONS
Home Care after Sacrocooccygeal/Sacroiliac Joint Injection    The doctor has done an injection in your lower back and/or muscle to decrease pain and inflammation. It can also help diagnose the source of your pain. You may feel sore at the injection site for the next 2-4 days. You may apply ice to the site for 20 minutes on and 20 minutes off to decrease pain and discomfort, if needed, for the first 24 hours. After 24 hours, you may use heat if needed. Your pain may subside right away, or it may take a number of days. This is because two medicines were used in the injection. The first, a local anesthetic, will only work for a few hours. The second, a steroid, may not start working for 2-5 days. Some patients have noticed no changes in their pain for up to 2 weeks. There may be a time after the local anesthetic wears off that you feel like you have more pain. This is called pain flare. If this happens:  Limit your activities for the first 24 hours to those that you can do without pain. Keep on taking your pain medicine as prescribed. Sometimes, some patients have had facial and neck flushing, anxiety or nervousness, and mood swings with the use of steroids. These symptoms most often occur within the first 24-48 hours and do not require any treatment. They should go away on their own within one week. If you have diabetes, steroids will cause your blood sugar to increase. Make sure your primary doctor is aware of this and that you have orders to treat your blood sugar to keep it within your normal range. You may have some weakness for the next 3-5 hours due to the anesthetic used. Take it easy. No baths or soaking the injection site for 24 hours after the procedure. Taking a shower is okay. You may resume taking your routine medicines after the procedure including pain medicines as prescribed. Resume any medications held for the procedure (blood thinners, aspirin, anti-inflammatories).     If

## 2023-08-18 NOTE — INTERVAL H&P NOTE
I have interviewed and examined the patient and reviewed the recent History and Physical.  There have been no changes to the recent H&P documentation. The surgical consent form has been signed. Last anticoagulant medication use was:-    Premedication taken for contrast allergy? No    Valium taken for oral sedation? No    No outpatient medications have been marked as taking for the 8/18/23 encounter [facility] Encounter). The patient understands the planned operation and its associated risks and benefits and agrees to proceed.         Electronically signed by Sharonda Perez MD on 8/18/2023 at 9:24 AM

## 2023-08-21 PROBLEM — M53.3 SACROILIAC PAIN: Status: ACTIVE | Noted: 2023-08-21

## 2023-09-12 DIAGNOSIS — M06.9 RHEUMATOID ARTHRITIS INVOLVING RIGHT HIP, UNSPECIFIED WHETHER RHEUMATOID FACTOR PRESENT (HCC): ICD-10-CM

## 2023-09-12 DIAGNOSIS — M54.41 CHRONIC RIGHT-SIDED LOW BACK PAIN WITH RIGHT-SIDED SCIATICA: ICD-10-CM

## 2023-09-12 DIAGNOSIS — G89.29 CHRONIC RIGHT-SIDED LOW BACK PAIN WITH RIGHT-SIDED SCIATICA: ICD-10-CM

## 2023-09-12 RX ORDER — OXYCODONE HYDROCHLORIDE AND ACETAMINOPHEN 5; 325 MG/1; MG/1
TABLET ORAL
Qty: 60 TABLET | Refills: 0 | Status: SHIPPED | OUTPATIENT
Start: 2023-09-12 | End: 2023-10-12

## 2023-09-12 NOTE — TELEPHONE ENCOUNTER
Patient called refill line for their   [facility]  [facility]       Last Appt:  8/7/2023  Next Appt:   Visit date not found  Med verified in [address]: 11/4/22    OAR[place] checked for [place], [place], and [place]:  LAST FILLED 8/7/23  DUE 9/12/23

## 2023-10-16 DIAGNOSIS — M06.9 RHEUMATOID ARTHRITIS INVOLVING RIGHT HIP, UNSPECIFIED WHETHER RHEUMATOID FACTOR PRESENT (HCC): ICD-10-CM

## 2023-10-16 DIAGNOSIS — G89.29 CHRONIC RIGHT-SIDED LOW BACK PAIN WITH RIGHT-SIDED SCIATICA: ICD-10-CM

## 2023-10-16 DIAGNOSIS — M54.41 CHRONIC RIGHT-SIDED LOW BACK PAIN WITH RIGHT-SIDED SCIATICA: ICD-10-CM

## 2023-10-16 RX ORDER — OXYCODONE HYDROCHLORIDE AND ACETAMINOPHEN 5; 325 MG/1; MG/1
TABLET ORAL
Qty: 60 TABLET | Refills: 0 | Status: SHIPPED | OUTPATIENT
Start: 2023-10-16 | End: 2023-11-15

## 2023-10-16 NOTE — TELEPHONE ENCOUNTER
Patient called refill line for their   Percocet 5-325 mg  [facility]      Last Appt:  8/7/2023  Next Appt:   Med verified in [address]: 11/4/22    [place]AR[place] Report checked for [place], and [place]:  last filled 9/12/23  Due 10/16/23

## 2023-10-26 ENCOUNTER — HOSPITAL ENCOUNTER (OUTPATIENT)
Dept: CT IMAGING | Age: 58
Discharge: HOME | End: 2023-10-28
Attending: INTERNAL MEDICINE
Payer: COMMERCIAL

## 2023-10-26 DIAGNOSIS — J43.9 PULMONARY EMPHYSEMA WITH FIBROSIS OF LUNG (HCC): ICD-10-CM

## 2023-10-26 DIAGNOSIS — J84.10 PULMONARY EMPHYSEMA WITH FIBROSIS OF LUNG (HCC): ICD-10-CM

## 2023-10-26 DIAGNOSIS — J43.2 CENTRILOBULAR EMPHYSEMA (HCC): ICD-10-CM

## 2023-10-26 DIAGNOSIS — M05.9 RHEUMATOID ARTHRITIS WITH POSITIVE RHEUMATOID FACTOR, INVOLVING UNSPECIFIED SITE (HCC): ICD-10-CM

## 2023-10-26 PROCEDURE — 71250 CT THORAX DX C-: CPT

## 2023-10-31 NOTE — TELEPHONE ENCOUNTER
Last Appt:  5/12/2023  Next Appt:   No appointment. Med verified in Epic     Left message with Patient to confirm she is still following this office.  Need to clarify still taking medicine

## 2023-11-01 ENCOUNTER — HOSPITAL ENCOUNTER (OUTPATIENT)
Age: 58
Discharge: HOME | End: 2023-11-01
Payer: COMMERCIAL

## 2023-11-01 ENCOUNTER — OFFICE VISIT (OUTPATIENT)
Dept: PULMONOLOGY | Age: 58
End: 2023-11-01

## 2023-11-01 VITALS
HEIGHT: 64 IN | DIASTOLIC BLOOD PRESSURE: 68 MMHG | BODY MASS INDEX: 25.27 KG/M2 | HEART RATE: 74 BPM | WEIGHT: 148 LBS | TEMPERATURE: 97.1 F | SYSTOLIC BLOOD PRESSURE: 106 MMHG | OXYGEN SATURATION: 96 %

## 2023-11-01 DIAGNOSIS — Z87.891 PERSONAL HISTORY OF TOBACCO USE: ICD-10-CM

## 2023-11-01 DIAGNOSIS — J43.9 BULLOUS EMPHYSEMA (HCC): ICD-10-CM

## 2023-11-01 DIAGNOSIS — F17.200 SMOKER: ICD-10-CM

## 2023-11-01 DIAGNOSIS — J44.9 CHRONIC OBSTRUCTIVE PULMONARY DISEASE, UNSPECIFIED COPD TYPE (HCC): Primary | ICD-10-CM

## 2023-11-01 DIAGNOSIS — M05.9 RHEUMATOID ARTHRITIS WITH POSITIVE RHEUMATOID FACTOR, INVOLVING UNSPECIFIED SITE (HCC): ICD-10-CM

## 2023-11-01 DIAGNOSIS — J43.8 PARASEPTAL EMPHYSEMA (HCC): ICD-10-CM

## 2023-11-01 PROCEDURE — 82104 ALPHA-1-ANTITRYPSIN PHENO: CPT

## 2023-11-01 PROCEDURE — 82103 ALPHA-1-ANTITRYPSIN TOTAL: CPT

## 2023-11-01 PROCEDURE — 36415 COLL VENOUS BLD VENIPUNCTURE: CPT

## 2023-11-03 RX ORDER — TICAGRELOR 90 MG/1
90 TABLET ORAL 2 TIMES DAILY
Qty: 60 TABLET | Refills: 5 | OUTPATIENT
Start: 2023-11-03

## 2023-11-03 NOTE — TELEPHONE ENCOUNTER
Pt returned call stating Charlene Franklin is not following here any longer due to one of the Dr's, not [place], being very rude to her and she decided she didn't need that\". Pt is going to [facility] now.

## 2023-11-04 LAB
ALPHA-1 ANTITRYPSIN PHENOTYPE: NORMAL
ALPHA-1 ANTITRYPSIN: 163 MG/DL (ref 90–200)

## 2023-11-16 DIAGNOSIS — M54.41 CHRONIC RIGHT-SIDED LOW BACK PAIN WITH RIGHT-SIDED SCIATICA: ICD-10-CM

## 2023-11-16 DIAGNOSIS — M06.9 RHEUMATOID ARTHRITIS INVOLVING RIGHT HIP, UNSPECIFIED WHETHER RHEUMATOID FACTOR PRESENT (HCC): ICD-10-CM

## 2023-11-16 DIAGNOSIS — G89.29 CHRONIC RIGHT-SIDED LOW BACK PAIN WITH RIGHT-SIDED SCIATICA: ICD-10-CM

## 2023-11-16 RX ORDER — TICAGRELOR 90 MG/1
90 TABLET ORAL 2 TIMES DAILY
Qty: 60 TABLET | Refills: 5 | OUTPATIENT
Start: 2023-11-16

## 2023-11-16 RX ORDER — OXYCODONE HYDROCHLORIDE AND ACETAMINOPHEN 5; 325 MG/1; MG/1
TABLET ORAL
Qty: 60 TABLET | Refills: 0 | Status: SHIPPED | OUTPATIENT
Start: 2023-11-16 | End: 2023-12-16

## 2023-11-16 NOTE — TELEPHONE ENCOUNTER
**NEEDS APPT BEFORE SENDING IN. LEFT VM**    Rita called requesting a refill of the below medication which has been pended for you:     Requested Prescriptions     Pending Prescriptions Disp Refills    oxyCODONE-acetaminophen (PERCOCET) 5-325 MG per tablet [Pharmacy Med Name: OXYCODONE-ACETAMINOPHEN 5-325 TABS] 60 tablet 0     Sig: TAKE ONE TABLET BY MOUTH TWICE A DAY AS NEEDED FOR PAIN FOR UP TO 30 DAYS . MAX DAILY AMOUNT: 2 TABLETS. TAKE THE LOWEST DOSE POSSIBLE TO MANAGE PAIN AND REDUCE DOSES TAKEN AS PAIN BECOMES MANAGEABLE. Last Appointment Date: 8/7/2023  Next Appointment Date: Visit date not found    No Known Allergies    Pharmacy:  [address] Report checked for [place], [place], and [place]:Percocet 5/325  10/16/23 #60. Due NOW.    **NEEDS APPT BEFORE SENDING IN.  LEFT VM**

## 2023-11-17 ENCOUNTER — OFFICE VISIT (OUTPATIENT)
Dept: PAIN MANAGEMENT | Age: 58
End: 2023-11-17
Payer: COMMERCIAL

## 2023-11-17 VITALS
HEIGHT: 64 IN | WEIGHT: 147 LBS | RESPIRATION RATE: 17 BRPM | SYSTOLIC BLOOD PRESSURE: 104 MMHG | BODY MASS INDEX: 25.1 KG/M2 | DIASTOLIC BLOOD PRESSURE: 60 MMHG | HEART RATE: 72 BPM | OXYGEN SATURATION: 95 %

## 2023-11-17 DIAGNOSIS — G57.01 PIRIFORMIS SYNDROME OF RIGHT SIDE: ICD-10-CM

## 2023-11-17 DIAGNOSIS — M54.41 CHRONIC RIGHT-SIDED LOW BACK PAIN WITH RIGHT-SIDED SCIATICA: ICD-10-CM

## 2023-11-17 DIAGNOSIS — M53.3 PAIN OF RIGHT SACROILIAC JOINT: ICD-10-CM

## 2023-11-17 DIAGNOSIS — M46.1 SACROILIITIS (HCC): Primary | ICD-10-CM

## 2023-11-17 DIAGNOSIS — G89.29 CHRONIC RIGHT-SIDED LOW BACK PAIN WITH RIGHT-SIDED SCIATICA: ICD-10-CM

## 2023-11-17 PROCEDURE — G8484 FLU IMMUNIZE NO ADMIN: HCPCS | Performed by: NURSE PRACTITIONER

## 2023-11-17 PROCEDURE — G8427 DOCREV CUR MEDS BY ELIG CLIN: HCPCS | Performed by: NURSE PRACTITIONER

## 2023-11-17 PROCEDURE — 3017F COLORECTAL CA SCREEN DOC REV: CPT | Performed by: NURSE PRACTITIONER

## 2023-11-17 PROCEDURE — 99214 OFFICE O/P EST MOD 30 MIN: CPT | Performed by: NURSE PRACTITIONER

## 2023-11-17 PROCEDURE — 4004F PT TOBACCO SCREEN RCVD TLK: CPT | Performed by: NURSE PRACTITIONER

## 2023-11-17 PROCEDURE — G8419 CALC BMI OUT NRM PARAM NOF/U: HCPCS | Performed by: NURSE PRACTITIONER

## 2023-11-17 RX ORDER — METOPROLOL SUCCINATE 25 MG/1
TABLET, EXTENDED RELEASE ORAL
COMMUNITY
Start: 2023-10-09

## 2023-11-17 RX ORDER — PREDNISONE 5 MG/1
TABLET ORAL
COMMUNITY
Start: 2023-11-05

## 2023-11-17 RX ORDER — SACUBITRIL AND VALSARTAN 24; 26 MG/1; MG/1
TABLET, FILM COATED ORAL
COMMUNITY
Start: 2023-08-17

## 2023-11-17 RX ORDER — FUROSEMIDE 20 MG/1
TABLET ORAL
COMMUNITY
Start: 2023-10-09

## 2023-11-17 ASSESSMENT — ENCOUNTER SYMPTOMS
GASTROINTESTINAL NEGATIVE: 1
SORE THROAT: 0
BACK PAIN: 1
RESPIRATORY NEGATIVE: 1

## 2023-11-17 NOTE — PROGRESS NOTES
[facility]) [facility]  [address] [facility], [address] [place] [address]    Patient Name: Rogelio Arzate  MRN: [id number]  Encounter Date: 11/17/2023     SUBJECTIVE:  Rogelio Arzate is a 66 y.o., female being seen today regarding   Chief Complaint   Patient presents with    Back Pain     Lower left hip     and routine medication management. Functionality Assessment & Goals: On a scale of 0 (Does not Interfere) to 10 (Completely Interferes)  Which number describes how during the past week pain has interfered with the following:   A. General Activity: 8    B. Mood:  6   C. Walking Ability:  8   D. Normal Work (Includes both work outside the home and housework):  8   E. Relations with Other People:  0   F. Sleep:  8    G. Enjoyment of Life:  8  2. Patient prefers to Take their Pain Medications:   [] On a regular basis   [x] Only when necessary   [] Does not take pain medications  3. What are the Patient's Goals/ Expectations for Visiting Pain Management? [] Learn about my pain   [] Physical Therapy   [] Receive Injections   [] Deal with Anxiety and Stress   [x] Receive Medication   [] Treat Depression    [] Treat Sleep   [] Treat Opioid Dependence/ Addiction      Current Pain Assessment:         11/17/2023     9:37 AM   AMB PAIN ASSESSMENT   Location of Pain Back   Location Modifiers Posterior;Medial;Lateral;Left   Severity of Pain 7   Quality of Pain Aching   Duration of Pain Persistent   Frequency of Pain Intermittent   Aggravating Factors Walking;Standing;Bending;Stairs;Stretching;Straightening;Kneeling;Squatting;Exercise   Limiting Behavior Yes   Relieving Factors Rest;Heat   Result of Injury No   Work-Related Injury No   Are there other pain locations you wish to document?  No        Current Medications & Allergies:   Current Outpatient Medications   Medication Instructions    Ascorbic Acid (VITAMIN C) 1000 MG tablet 1 tablet Orally Once a day for 30 day(s)    aspirin 81 mg, Oral, DAILY

## 2023-12-14 ENCOUNTER — TELEPHONE (OUTPATIENT)
Dept: FAMILY MEDICINE CLINIC | Age: 58
End: 2023-12-14

## 2024-01-08 RX ORDER — PAROXETINE HYDROCHLORIDE 40 MG/1
40 TABLET, FILM COATED ORAL DAILY
Qty: 90 TABLET | Refills: 1 | Status: SHIPPED | OUTPATIENT
Start: 2024-01-08

## 2024-01-08 NOTE — TELEPHONE ENCOUNTER
Rita called requesting a refill of the below medication which has been pended for you:     Requested Prescriptions     Pending Prescriptions Disp Refills    PARoxetine (PAXIL) 40 MG tablet [Pharmacy Med Name: PAROXETINE TAB 40MG HCL] 90 tablet 1     Sig: TAKE 1 TABLET DAILY       Last Appointment Date: 8/3/2023  Next Appointment Date: 2/5/2024    No Known Allergies

## 2024-01-17 ENCOUNTER — OFFICE VISIT (OUTPATIENT)
Dept: PAIN MANAGEMENT | Age: 59
End: 2024-01-17
Payer: COMMERCIAL

## 2024-01-17 VITALS
BODY MASS INDEX: 25.1 KG/M2 | DIASTOLIC BLOOD PRESSURE: 56 MMHG | WEIGHT: 147 LBS | HEIGHT: 64 IN | OXYGEN SATURATION: 95 % | SYSTOLIC BLOOD PRESSURE: 118 MMHG | HEART RATE: 77 BPM

## 2024-01-17 DIAGNOSIS — G89.29 CHRONIC RIGHT-SIDED LOW BACK PAIN WITH RIGHT-SIDED SCIATICA: ICD-10-CM

## 2024-01-17 DIAGNOSIS — M53.3 PAIN OF RIGHT SACROILIAC JOINT: ICD-10-CM

## 2024-01-17 DIAGNOSIS — G57.01 PIRIFORMIS SYNDROME OF RIGHT SIDE: Primary | ICD-10-CM

## 2024-01-17 DIAGNOSIS — M06.9 RHEUMATOID ARTHRITIS INVOLVING RIGHT HIP, UNSPECIFIED WHETHER RHEUMATOID FACTOR PRESENT (HCC): ICD-10-CM

## 2024-01-17 DIAGNOSIS — M54.41 CHRONIC RIGHT-SIDED LOW BACK PAIN WITH RIGHT-SIDED SCIATICA: ICD-10-CM

## 2024-01-17 PROCEDURE — 99214 OFFICE O/P EST MOD 30 MIN: CPT | Performed by: NURSE PRACTITIONER

## 2024-01-17 ASSESSMENT — ENCOUNTER SYMPTOMS
RESPIRATORY NEGATIVE: 1
BACK PAIN: 1
SORE THROAT: 0
GASTROINTESTINAL NEGATIVE: 1

## 2024-01-17 NOTE — PROGRESS NOTES
[facility] Pain Management  [address] [place]. [address]    Patient Name: Rita Manjarrez  MRN: [id number]  Encounter Date: 1/18/2024     SUBJECTIVE:  Rita Manjarrez is a 58 y.o., female being seen today regarding   Chief Complaint   Patient presents with    Lower Back Pain     Pain radiates into hips     and routine medication management.    Functionality Assessment & Goals:  On a scale of 0 (Does not Interfere) to 10 (Completely Interferes)  Which number describes how during the past week pain has interfered with the following:   A.  General Activity: 8    B.  Mood:  6   C.  Walking Ability:  8   D.  Normal Work (Includes both work outside the home and housework):  8   E.  Relations with Other People:  0   F.  Sleep:  8    G.  Enjoyment of Life:  8  2.  Patient prefers to Take their Pain Medications:   [] On a regular basis   [x] Only when necessary   [] Does not take pain medications  3.  What are the Patient's Goals/ Expectations for Visiting Pain Management?   [] Learn about my pain   [] Physical Therapy   [] Receive Injections   [] Deal with Anxiety and Stress   [x] Receive Medication   [] Treat Depression    [] Treat Sleep   [] Treat Opioid Dependence/ Addiction      Current Pain Assessment:         11/17/2023     9:37 AM   AMB PAIN ASSESSMENT   Location of Pain Back   Location Modifiers Posterior;Medial;Lateral;Left   Severity of Pain 7   Quality of Pain Aching   Duration of Pain Persistent   Frequency of Pain Intermittent   Aggravating Factors Walking;Standing;Bending;Stairs;Stretching;Straightening;Kneeling;Squatting;Exercise   Limiting Behavior Yes   Relieving Factors Rest;Heat   Result of Injury No   Work-Related Injury No   Are there other pain locations you wish to document? No        Current Medications & Allergies:   Current Outpatient Medications   Medication Instructions    Ascorbic Acid (VITAMIN C) 1000 MG tablet 1 tablet Orally Once a day for 30 day(s)    aspirin 81

## 2024-01-18 RX ORDER — OXYCODONE HYDROCHLORIDE AND ACETAMINOPHEN 5; 325 MG/1; MG/1
1 TABLET ORAL EVERY 8 HOURS PRN
Qty: 90 TABLET | Refills: 0 | Status: SHIPPED | OUTPATIENT
Start: 2024-01-18 | End: 2024-02-17

## 2024-01-29 RX ORDER — ESOMEPRAZOLE MAGNESIUM 40 MG/1
40 CAPSULE, DELAYED RELEASE ORAL
Qty: 90 CAPSULE | Refills: 1 | Status: SHIPPED | OUTPATIENT
Start: 2024-01-29

## 2024-01-29 RX ORDER — PAROXETINE 10 MG/1
TABLET, FILM COATED ORAL
Qty: 90 TABLET | Refills: 1 | Status: SHIPPED | OUTPATIENT
Start: 2024-01-29

## 2024-01-29 NOTE — TELEPHONE ENCOUNTER
Rita called requesting a refill of the below medication which has been pended for you:     Requested Prescriptions     Pending Prescriptions Disp Refills    esomeprazole (NEXIUM) 40 MG delayed release capsule [Pharmacy Med Name: ESOMEPRA MAG CAP 40MG DR] 90 capsule 1     Sig: TAKE 1 CAPSULE EVERY       MORNING BEFORE BREAKFAST    PARoxetine (PAXIL) 10 MG tablet [Pharmacy Med Name: PAROXETINE TAB 10MG HCL] 90 tablet 1     Sig: TAKE 1 TABLET DAILY       Last Appointment Date: 8/3/2023  Next Appointment Date: 2/5/2024    No Known Allergies

## 2024-02-05 ENCOUNTER — TELEPHONE (OUTPATIENT)
Dept: SURGERY | Age: 59
End: 2024-02-05

## 2024-02-05 ENCOUNTER — OFFICE VISIT (OUTPATIENT)
Dept: FAMILY MEDICINE CLINIC | Age: 59
End: 2024-02-05
Payer: COMMERCIAL

## 2024-02-05 VITALS
HEART RATE: 75 BPM | OXYGEN SATURATION: 97 % | BODY MASS INDEX: 24.96 KG/M2 | SYSTOLIC BLOOD PRESSURE: 122 MMHG | WEIGHT: 145.4 LBS | DIASTOLIC BLOOD PRESSURE: 86 MMHG

## 2024-02-05 DIAGNOSIS — I25.10 CORONARY ARTERY DISEASE INVOLVING NATIVE CORONARY ARTERY OF NATIVE HEART WITHOUT ANGINA PECTORIS: ICD-10-CM

## 2024-02-05 DIAGNOSIS — E78.5 HYPERLIPIDEMIA, UNSPECIFIED HYPERLIPIDEMIA TYPE: Primary | ICD-10-CM

## 2024-02-05 DIAGNOSIS — Z12.11 ENCOUNTER FOR SCREENING COLONOSCOPY: ICD-10-CM

## 2024-02-05 DIAGNOSIS — G47.33 OSA ON CPAP: ICD-10-CM

## 2024-02-05 DIAGNOSIS — I87.2 VENOUS INSUFFICIENCY OF BOTH LOWER EXTREMITIES: ICD-10-CM

## 2024-02-05 DIAGNOSIS — M05.9 RHEUMATOID ARTHRITIS WITH POSITIVE RHEUMATOID FACTOR, INVOLVING UNSPECIFIED SITE (HCC): ICD-10-CM

## 2024-02-05 DIAGNOSIS — D12.6 ADENOMATOUS POLYP OF COLON, UNSPECIFIED PART OF COLON: ICD-10-CM

## 2024-02-05 DIAGNOSIS — M54.32 SCIATICA, LEFT SIDE: ICD-10-CM

## 2024-02-05 DIAGNOSIS — Z72.0 TOBACCO ABUSE: ICD-10-CM

## 2024-02-05 DIAGNOSIS — K21.9 GASTROESOPHAGEAL REFLUX DISEASE WITHOUT ESOPHAGITIS: ICD-10-CM

## 2024-02-05 PROCEDURE — 99214 OFFICE O/P EST MOD 30 MIN: CPT | Performed by: FAMILY MEDICINE

## 2024-02-05 RX ORDER — ESOMEPRAZOLE MAGNESIUM 40 MG/1
40 CAPSULE, DELAYED RELEASE ORAL
Qty: 90 CAPSULE | Refills: 1 | Status: SHIPPED | OUTPATIENT
Start: 2024-02-05

## 2024-02-05 ASSESSMENT — ENCOUNTER SYMPTOMS
COUGH: 0
SORE THROAT: 0
RHINORRHEA: 0
VOICE CHANGE: 0
BACK PAIN: 1
GASTROINTESTINAL NEGATIVE: 1
EYES NEGATIVE: 1
SINUS PRESSURE: 0
RESPIRATORY NEGATIVE: 1

## 2024-02-05 NOTE — PATIENT INSTRUCTIONS
Orders Only on 01/04/2024   Component Date Value Ref Range Status    WBC 01/04/2024 4.82  (3.80  - 10.60) x10^3ul Final    RBC 01/04/2024 3.79 (L)  (4.20  - 5.40) x10^6ul Final    Hemoglobin 01/04/2024 12.1  (12.0  - 16.0) G/DL Final    Hematocrit 01/04/2024 36.5 (L)  (37.0  - 47.0) % Final    MCV 01/04/2024 96.3  (81.0  - 99.0) fl Final    MCH 01/04/2024 31.9  (27.0  - 33.0) PG Final    MCHC 01/04/2024 33.2  (30.0  - 37.0) G/DL Final    Platelets 01/04/2024 250  (130   - 400) x10^3ul Final    Albumin 01/04/2024 3.9  (3.5   - 5.0) G/DL Final    ALT 01/04/2024 23  (1     - 35) U/L Final    AST 01/04/2024 24  (15    - 46) U/L Final    Alk Phosphatase 01/04/2024 95  (38    - 126) U/L Final    Creatinine 01/04/2024 0.57  (0.52  - 1.04) MG/DL Final    GFR Non- 01/04/2024 109.7  (60.0  - 115.8) ML/M1.7 Final    GFR  01/04/2024 132.8  (60.0  - 140.1) ML/M1.7 Final    Sed Rate 01/04/2024 16  (0     - 25) MM/HR Final    CRP 01/04/2024 2.20  (0.00  - 3.20) MG/L Final    Calcium 01/04/2024 9.1  (8.6   - 10.6) MG/DL Final

## 2024-02-05 NOTE — PROGRESS NOTES
Subjective:      Patient ID: Rita Manjarrez is a 58 y.o. female.    Hyperlipidemia    Pharyngitis  Associated symptoms include arthralgias (right wrist/mcps, toes/fingers). Pertinent negatives include no congestion, coughing, fatigue, fever or sore throat.   Medication Refill  Associated symptoms include arthralgias (right wrist/mcps, toes/fingers). Pertinent negatives include no congestion, coughing, fatigue, fever or sore throat.   Back Pain  Pertinent negatives include no fever.       Routine follow up on chronic medical conditions, annual preventive exam, refills, and review of updated labs.    Her rheumatoid arthritis is ongoing.  She has been on methotrexate and humira.  Not having complete remission in her migratory arthralgias.  Moving through all the joints by report. Currently having more pain  chronically in the toes long term.  Currently with sciatica symptoms and back pain on the left side.  She has 5mg prednisone/day through rheumatology, not taking at present.     Compliant with medications at present. Not using prednisone daily.    Some chronic hoarse voice.  Smoker.  Inhaled steroid use.  Saw ent x 2 and had direct visualization of cords with just irritation.  She had some benefit from mycelex troches, giving some indication that fungal etiology may have been going on.  Seems better at present.     No gerd symptoms.     Trying to quit smoking.  Using patches.  Still trying to quit absolutely.        Past Medical History:   Diagnosis Date    Acute on chronic systolic congestive heart failure (HCC) 05/08/2023    Fibroid uterus     removed with laparoscopy     GERD (gastroesophageal reflux disease)     HLD (hyperlipidemia) 05/08/2023    Hyperlipidemia     ОЛЬГА on CPAP     Rheumatoid arthritis (HCC)     Tobacco abuse        Past Surgical History:   Procedure Laterality Date    BACK INJECTION Right 09/30/2022    Right SACROILIAC  & Piriformis JOINT INJECTION performed by Sergey Frank MD at [facility] OR

## 2024-02-05 NOTE — TELEPHONE ENCOUNTER
Mailed Dr Bennett colonoscopy paperwork   Patient states got her refills so she does not require a call back.

## 2024-02-06 ENCOUNTER — TELEPHONE (OUTPATIENT)
Dept: FAMILY MEDICINE CLINIC | Age: 59
End: 2024-02-06

## 2024-02-26 ENCOUNTER — HOSPITAL ENCOUNTER (OUTPATIENT)
Age: 59
Setting detail: SPECIMEN
Discharge: HOME | End: 2024-02-26
Payer: COMMERCIAL

## 2024-02-26 ENCOUNTER — OFFICE VISIT (OUTPATIENT)
Dept: PAIN MANAGEMENT | Age: 59
End: 2024-02-26
Payer: COMMERCIAL

## 2024-02-26 VITALS
DIASTOLIC BLOOD PRESSURE: 70 MMHG | HEART RATE: 83 BPM | SYSTOLIC BLOOD PRESSURE: 110 MMHG | OXYGEN SATURATION: 98 % | BODY MASS INDEX: 24.89 KG/M2 | WEIGHT: 145 LBS

## 2024-02-26 DIAGNOSIS — Z79.891 ENCOUNTER FOR LONG-TERM OPIATE ANALGESIC USE: ICD-10-CM

## 2024-02-26 DIAGNOSIS — M46.1 SACROILIITIS (HCC): ICD-10-CM

## 2024-02-26 DIAGNOSIS — M05.751 RHEUMATOID ARTHRITIS INVOLVING RIGHT HIP WITH POSITIVE RHEUMATOID FACTOR (HCC): Primary | ICD-10-CM

## 2024-02-26 DIAGNOSIS — Z02.83 ENCOUNTER FOR DRUG SCREENING: ICD-10-CM

## 2024-02-26 DIAGNOSIS — M54.41 CHRONIC RIGHT-SIDED LOW BACK PAIN WITH RIGHT-SIDED SCIATICA: ICD-10-CM

## 2024-02-26 DIAGNOSIS — G89.29 CHRONIC RIGHT-SIDED LOW BACK PAIN WITH RIGHT-SIDED SCIATICA: ICD-10-CM

## 2024-02-26 PROCEDURE — G0481 DRUG TEST DEF 8-14 CLASSES: HCPCS

## 2024-02-26 PROCEDURE — 80307 DRUG TEST PRSMV CHEM ANLYZR: CPT

## 2024-02-26 PROCEDURE — 99214 OFFICE O/P EST MOD 30 MIN: CPT | Performed by: NURSE PRACTITIONER

## 2024-02-26 RX ORDER — OXYCODONE HYDROCHLORIDE AND ACETAMINOPHEN 5; 325 MG/1; MG/1
1 TABLET ORAL EVERY 8 HOURS PRN
Qty: 90 TABLET | Refills: 0 | Status: SHIPPED | OUTPATIENT
Start: 2024-02-26 | End: 2024-03-27

## 2024-02-26 RX ORDER — OXYCODONE HYDROCHLORIDE AND ACETAMINOPHEN 5; 325 MG/1; MG/1
1 TABLET ORAL EVERY 8 HOURS PRN
Qty: 12 TABLET | Refills: 0 | Status: SHIPPED | OUTPATIENT
Start: 2024-02-26 | End: 2024-02-26

## 2024-02-26 ASSESSMENT — ENCOUNTER SYMPTOMS
GASTROINTESTINAL NEGATIVE: 1
SORE THROAT: 0
RESPIRATORY NEGATIVE: 1
BACK PAIN: 1

## 2024-02-26 NOTE — PROGRESS NOTES
usage, prolonged expiration, respiratory distress or retractions. She is not intubated.   Abdominal:      Palpations: Abdomen is soft.   Musculoskeletal:      Lumbar back: Bony tenderness (bilateral PSIS) present.      Right foot: No Charcot foot or foot drop.      Left foot: No Charcot foot or foot drop.   Skin:     General: Skin is warm and dry.      Capillary Refill: Capillary refill takes less than 2 seconds.      Coloration: Skin is not ashen, jaundiced or pale.      Findings: No rash.      Nails: There is no clubbing.   Neurological:      Mental Status: She is alert and oriented to person, place, and time.      GCS: GCS eye subscore is 4. GCS verbal subscore is 5. GCS motor subscore is 6.      Cranial Nerves: No cranial nerve deficit.   Psychiatric:         Attention and Perception: Attention normal.         Mood and Affect: Mood normal.         Speech: Speech normal.         Behavior: Behavior is cooperative.         Cognition and Memory: Cognition normal.         Judgment: Judgment normal.        ASSESSMENT:    ICD-10-CM    1. Rheumatoid arthritis involving right hip with positive rheumatoid factor ([facility])  M05.751 oxyCODONE-acetaminophen (PERCOCET) 5-325 MG per tablet      2. Chronic right-sided low back pain with right-sided sciatica  M54.41 oxyCODONE-acetaminophen (PERCOCET) 5-325 MG per tablet    G89.29       3. Sacroiliitis ([facility])  M46.1 oxyCODONE-acetaminophen (PERCOCET) 5-325 MG per tablet      4. Encounter for drug screening  Z02.83 Pain Management Drug Screen      5. Encounter for long-term opiate analgesic use  Z79.891 Pain Management Drug Screen           I have reviewed the chief complaint and the history of present illness, vitals and all subjective documentation by [facility] clinical staff.    PLAN:  Rita Manjarrez is here for a recheck of chronic pain and medication management.  Medications:  The current medication plan   [x] is controlling pain and providing improvement with quality of

## 2024-02-29 LAB
6-ACETYLMORPHINE, UR: NOT DETECTED
7-AMINOCLONAZEPAM, URINE: NOT DETECTED
ALPHA-OH-ALPRAZ, URINE: NOT DETECTED
ALPHA-OH-MIDAZOLAM, URINE: NOT DETECTED
ALPRAZOLAM, URINE: NOT DETECTED
AMPHETAMINES, URINE: NOT DETECTED
BARBITURATES, URINE: NEGATIVE
BENZOYLECGONINE, UR: NEGATIVE
BUPRENORPHINE URINE: NOT DETECTED
CARISOPRODOL, UR: NEGATIVE
CLONAZEPAM, URINE: NOT DETECTED
CODEINE, URINE: NOT DETECTED
CREAT UR-MCNC: 232.5 MG/DL (ref 20–400)
DIAZEPAM, URINE: NOT DETECTED
EER PAIN MGT DRUG PANEL, HIGH RES/EMIT U: NORMAL
ETHYL GLUCURONIDE UR: NORMAL
FENTANYL URINE: NOT DETECTED
GABAPENTIN: NOT DETECTED
HYDROCODONE, URINE: NOT DETECTED
HYDROMORPHONE, URINE: NOT DETECTED
LORAZEPAM, URINE: NOT DETECTED
MARIJUANA METAB, UR: NORMAL
MDA, UR: NOT DETECTED
MDEA, EVE, UR: NOT DETECTED
MDMA URINE: NOT DETECTED
MEPERIDINE METAB, UR: NOT DETECTED
METHADONE, URINE: NEGATIVE
METHAMPHETAMINE, URINE: NOT DETECTED
METHYLPHENIDATE: NOT DETECTED
MIDAZOLAM, URINE: NOT DETECTED
MORPHINE, OPI1M: NOT DETECTED
NALOXONE URINE: NOT DETECTED
NORBUPRENORPHINE, URINE: NOT DETECTED
NORDIAZEPAM, URINE: NOT DETECTED
NORFENTANYL, URINE: NOT DETECTED
NORHYDROCODONE, URINE: NOT DETECTED
NOROXYCODONE, URINE: PRESENT
NOROXYMORPHONE, URINE: NOT DETECTED
OXAZEPAM, URINE: NOT DETECTED
OXYCODONE URINE: NOT DETECTED
OXYMORPHONE, URINE: PRESENT
PAIN MGT DRUG PANEL, HI RES, UR: NORMAL
PCP,URINE: NEGATIVE
PHENTERMINE, UR: NOT DETECTED
PREGABALIN: NOT DETECTED
TAPENTADOL, URINE: NOT DETECTED
TAPENTADOL-O-SULFATE, URINE: NOT DETECTED
TEMAZEPAM, URINE: NOT DETECTED
TRAMADOL, URINE: NEGATIVE
ZOLPIDEM METABOLITE (ZCA), URINE: NOT DETECTED
ZOLPIDEM, URINE: NOT DETECTED

## 2024-03-20 ENCOUNTER — HOSPITAL ENCOUNTER (OUTPATIENT)
Dept: GENERAL RADIOLOGY | Age: 59
Discharge: HOME | End: 2024-03-22
Payer: COMMERCIAL

## 2024-03-20 ENCOUNTER — TELEPHONE (OUTPATIENT)
Dept: PRIMARY CARE CLINIC | Age: 59
End: 2024-03-20

## 2024-03-20 ENCOUNTER — OFFICE VISIT (OUTPATIENT)
Dept: PRIMARY CARE CLINIC | Age: 59
End: 2024-03-20
Payer: COMMERCIAL

## 2024-03-20 VITALS
HEART RATE: 85 BPM | WEIGHT: 143 LBS | OXYGEN SATURATION: 98 % | SYSTOLIC BLOOD PRESSURE: 92 MMHG | DIASTOLIC BLOOD PRESSURE: 60 MMHG | TEMPERATURE: 98.7 F | BODY MASS INDEX: 24.55 KG/M2

## 2024-03-20 DIAGNOSIS — J06.9 UPPER RESPIRATORY TRACT INFECTION, UNSPECIFIED TYPE: ICD-10-CM

## 2024-03-20 DIAGNOSIS — R07.89 RIGHT-SIDED CHEST WALL PAIN: Primary | ICD-10-CM

## 2024-03-20 DIAGNOSIS — R07.89 RIGHT-SIDED CHEST WALL PAIN: ICD-10-CM

## 2024-03-20 PROCEDURE — 99213 OFFICE O/P EST LOW 20 MIN: CPT

## 2024-03-20 PROCEDURE — 4004F PT TOBACCO SCREEN RCVD TLK: CPT

## 2024-03-20 PROCEDURE — 3017F COLORECTAL CA SCREEN DOC REV: CPT

## 2024-03-20 PROCEDURE — G8420 CALC BMI NORM PARAMETERS: HCPCS

## 2024-03-20 PROCEDURE — G8484 FLU IMMUNIZE NO ADMIN: HCPCS

## 2024-03-20 PROCEDURE — 71046 X-RAY EXAM CHEST 2 VIEWS: CPT

## 2024-03-20 PROCEDURE — G8427 DOCREV CUR MEDS BY ELIG CLIN: HCPCS

## 2024-03-20 RX ORDER — BENZONATATE 100 MG/1
100 CAPSULE ORAL 3 TIMES DAILY PRN
Qty: 21 CAPSULE | Refills: 0 | Status: SHIPPED | OUTPATIENT
Start: 2024-03-20 | End: 2024-03-27

## 2024-03-20 ASSESSMENT — ENCOUNTER SYMPTOMS
ABDOMINAL PAIN: 0
SHORTNESS OF BREATH: 1
VOMITING: 0
SORE THROAT: 0
RHINORRHEA: 1
COUGH: 1
NAUSEA: 1

## 2024-03-20 NOTE — PATIENT INSTRUCTIONS
Apply warm compresses for 15-20 minutes, 3-4 times a day  Gentle stretches as tolerated  Go to ER for chest pain (worsening, changing), fevers > 102.5 not reduced with tylenol, acute worsening of shortness of breath.   Return for continued or worsening symptoms

## 2024-03-20 NOTE — TELEPHONE ENCOUNTER
----- Message from PARAS Rodriguez CNP sent at 3/20/2024  6:32 PM EDT -----  Please contact patient and inform that chest xray is negative for pneumonia or acute pulmonary disease

## 2024-03-20 NOTE — PROGRESS NOTES
[facility]  [address] [facility] [address]  Phone: [phone]  Fax: [phone]      Rita Manjarrez is a 59 y.o. female who presents to the [facility] today for her medical conditions/complaints as noted below. Rita Manjarrez is c/o of Chest Congestion (Chest congestion, fever, productive cough, chest pain)          HPI:     Chest Congestion  This is a new problem. The current episode started 1 to 4 weeks ago (cough x 1 week, right sided chest pain started last night). The problem occurs constantly. The problem has been waxing and waning. Associated symptoms include chest pain (right side), congestion, coughing, a fever, headaches and nausea (resolved). Pertinent negatives include no abdominal pain, sore throat or vomiting. Treatments tried: oxycodone. The treatment provided mild relief.       Past Medical History:   Diagnosis Date    Acute on chronic systolic congestive heart failure (HCC) 05/08/2023    Fibroid uterus     removed with laparoscopy     GERD (gastroesophageal reflux disease)     HLD (hyperlipidemia) 05/08/2023    Hyperlipidemia     ОЛЬГА on CPAP     Rheumatoid arthritis (HCC)     Tobacco abuse         No Known Allergies    Wt Readings from Last 3 Encounters:   03/20/24 64.9 kg (143 lb)   02/26/24 65.8 kg (145 lb)   02/05/24 66 kg (145 lb 6.4 oz)     BP Readings from Last 3 Encounters:   03/20/24 92/60   02/26/24 110/70   02/05/24 122/86      Temp Readings from Last 3 Encounters:   03/20/24 98.7 °F (37.1 °C) (Tympanic)   11/01/23 97.1 °F (36.2 °C)   08/18/23 98.3 °F (36.8 °C) (Temporal)     Pulse Readings from Last 3 Encounters:   03/20/24 85   02/26/24 83   02/05/24 75     SpO2 Readings from Last 3 Encounters:   03/20/24 98%   02/26/24 98%   02/05/24 97%       Subjective:      Review of Systems   Constitutional:  Positive for fever. Negative for appetite change.   HENT:  Positive for congestion, postnasal drip and

## 2024-04-03 DIAGNOSIS — M46.1 SACROILIITIS (HCC): ICD-10-CM

## 2024-04-03 DIAGNOSIS — M54.41 CHRONIC RIGHT-SIDED LOW BACK PAIN WITH RIGHT-SIDED SCIATICA: ICD-10-CM

## 2024-04-03 DIAGNOSIS — M05.751 RHEUMATOID ARTHRITIS INVOLVING RIGHT HIP WITH POSITIVE RHEUMATOID FACTOR (HCC): ICD-10-CM

## 2024-04-03 DIAGNOSIS — G89.29 CHRONIC RIGHT-SIDED LOW BACK PAIN WITH RIGHT-SIDED SCIATICA: ICD-10-CM

## 2024-04-03 RX ORDER — OXYCODONE HYDROCHLORIDE AND ACETAMINOPHEN 5; 325 MG/1; MG/1
1 TABLET ORAL EVERY 8 HOURS PRN
Qty: 12 TABLET | Refills: 0 | Status: SHIPPED | OUTPATIENT
Start: 2024-04-03 | End: 2024-05-03

## 2024-04-03 NOTE — TELEPHONE ENCOUNTER
Patient called refill line for their   Percocet 5-325 mg  [facility]     Last Appt:  2/26/2024  Next Appt:   4/26/2024  Med verified in [facility]    Last DS11: 2/26/24    [place]AR[place] Report checked for [place], [place], and [place]:  Last filled 2/26/24  Due 4/3/24

## 2024-04-26 ENCOUNTER — OFFICE VISIT (OUTPATIENT)
Dept: PAIN MANAGEMENT | Age: 59
End: 2024-04-26
Payer: COMMERCIAL

## 2024-04-26 VITALS
HEIGHT: 64 IN | BODY MASS INDEX: 25.1 KG/M2 | SYSTOLIC BLOOD PRESSURE: 112 MMHG | DIASTOLIC BLOOD PRESSURE: 64 MMHG | WEIGHT: 147 LBS

## 2024-04-26 DIAGNOSIS — M54.41 CHRONIC RIGHT-SIDED LOW BACK PAIN WITH RIGHT-SIDED SCIATICA: ICD-10-CM

## 2024-04-26 DIAGNOSIS — G89.29 CHRONIC RIGHT-SIDED LOW BACK PAIN WITH RIGHT-SIDED SCIATICA: ICD-10-CM

## 2024-04-26 DIAGNOSIS — G57.01 PIRIFORMIS SYNDROME OF RIGHT SIDE: ICD-10-CM

## 2024-04-26 DIAGNOSIS — M46.1 SACROILIITIS (HCC): ICD-10-CM

## 2024-04-26 DIAGNOSIS — M05.751 RHEUMATOID ARTHRITIS INVOLVING RIGHT HIP WITH POSITIVE RHEUMATOID FACTOR (HCC): ICD-10-CM

## 2024-04-26 DIAGNOSIS — M53.3 PAIN OF RIGHT SACROILIAC JOINT: Primary | ICD-10-CM

## 2024-04-26 PROCEDURE — 99214 OFFICE O/P EST MOD 30 MIN: CPT | Performed by: NURSE PRACTITIONER

## 2024-04-26 RX ORDER — OXYCODONE HYDROCHLORIDE AND ACETAMINOPHEN 5; 325 MG/1; MG/1
1 TABLET ORAL EVERY 8 HOURS PRN
Qty: 90 TABLET | Refills: 0 | Status: SHIPPED | OUTPATIENT
Start: 2024-04-26 | End: 2024-05-26

## 2024-04-26 ASSESSMENT — ENCOUNTER SYMPTOMS
GASTROINTESTINAL NEGATIVE: 1
RESPIRATORY NEGATIVE: 1
BACK PAIN: 1
SORE THROAT: 0

## 2024-04-26 NOTE — PROGRESS NOTES
quality of life and function and will continue current regimen    Controlled Substance Monitoring:    Acute and Chronic Pain Monitoring:   RX Monitoring Periodic Controlled Substance Monitoring   4/26/2024   1:15 PM No signs of potential drug abuse or diversion identified.;Obtaining appropriate analgesic effect of treatment.     Random drug screen today for opiate medication compliance.    The patient shows no evidence of misuse or diversion of medications and denies use of alcohol or illegal substances.  A urine drug screen has been appropriate as shown with the most recent screen.    Follow-up Plan:  Schedule patient to follow up with our office for recheck in 2 months       PARAS Ventura - CNP  Pain Management  [facility]

## 2024-05-28 RX ORDER — ATORVASTATIN CALCIUM 40 MG/1
40 TABLET, FILM COATED ORAL DAILY
Qty: 90 TABLET | Refills: 3 | Status: SHIPPED | OUTPATIENT
Start: 2024-05-28

## 2024-05-31 DIAGNOSIS — M46.1 SACROILIITIS (HCC): ICD-10-CM

## 2024-05-31 DIAGNOSIS — M54.41 CHRONIC RIGHT-SIDED LOW BACK PAIN WITH RIGHT-SIDED SCIATICA: ICD-10-CM

## 2024-05-31 DIAGNOSIS — M05.751 RHEUMATOID ARTHRITIS INVOLVING RIGHT HIP WITH POSITIVE RHEUMATOID FACTOR (HCC): ICD-10-CM

## 2024-05-31 DIAGNOSIS — G89.29 CHRONIC RIGHT-SIDED LOW BACK PAIN WITH RIGHT-SIDED SCIATICA: ICD-10-CM

## 2024-05-31 RX ORDER — OXYCODONE HYDROCHLORIDE AND ACETAMINOPHEN 5; 325 MG/1; MG/1
1 TABLET ORAL EVERY 8 HOURS PRN
Qty: 90 TABLET | Refills: 0 | Status: SHIPPED | OUTPATIENT
Start: 2024-05-31 | End: 2024-06-30

## 2024-05-31 NOTE — TELEPHONE ENCOUNTER
Rita called requesting a refill of the below medication which has been pended for you:     Requested Prescriptions     Pending Prescriptions Disp Refills    oxyCODONE-acetaminophen (PERCOCET) 5-325 MG per tablet 90 tablet 0     Sig: Take 1 tablet by mouth every 8 hours as needed for Pain for up to 30 days. Max Daily Amount: 3 tablets       Last Appointment Date: 4/26/2024  Next Appointment Date: Visit date not found    No Known Allergies    Pharmacy:  [facility]    Last DS11 2/26/24.  Please review.       OAR[place] Report checked for [place], [place], and [place]: percocet 5/325 mg  4/26/24 #90. Due now.

## 2024-06-17 ENCOUNTER — TELEPHONE (OUTPATIENT)
Dept: SURGERY | Age: 59
End: 2024-06-17

## 2024-06-17 RX ORDER — IBUPROFEN 200 MG
400 TABLET ORAL EVERY 6 HOURS PRN
COMMUNITY

## 2024-06-17 NOTE — TELEPHONE ENCOUNTER
[facility]         Patient:Rita Manjarrez           :1965           Surgical/Procedure Planned: Colonoscopy    Date & Location: 9-10-24 @ [facility]       Outpatient   Planned Length of OR: 30 minutes    Sedation: general        Estimated Cardiac Risk for Non-Cardiac Surgery/Procedure     Low____x__ Moderate______ High______    Medication Instructions - Clarification needed by this date:       Jardiance 10 mg daily Hold __7_ Days    Resume medications:pod #`1     Lovenox indicated: _____Yes __x___NO    Provider:Dr Bennett      Signature of Provider Giving Orders for Medication holds:    ___Electronically signed by George Strickland MD on 2024 at 3:17 PM  __________________________________________

## 2024-06-17 NOTE — TELEPHONE ENCOUNTER
Faxed Cardiology at [facility] Dr Morales regarding ASA, Brilinta, and Jardiance    Fax: [phone]  Phone: [phone]

## 2024-06-17 NOTE — TELEPHONE ENCOUNTER
Instructions reviewed over the phone and mailed to the patient. All questions answered and patient denies any further questions at this time. Patient scheduled for colonoscopy on 9-10-24 at [facility] with Dr. Bennett. Instructed patient she can purchase the Miralax/Gatorade bowel prep over the counter at her pharmacy.     Sent medication hold to [facility]

## 2024-06-17 NOTE — TELEPHONE ENCOUNTER
H &P Colonoscopy  Patient:Rita Manjarrez                 :1965  (yes) patient has seen Dr. Bennett prior to procedure  PCP: Dr Strickland    CC:History of colon polyps        HPI:    ROS:  All 12 systems negative except the positives in the HPI.     Colonoscopy  Abd pain: yes, occasionally but starts in the lower back and radiates to the lower ABD  Anemia: no  Bloating:no  Diarrhea: yes, about 2-3 times a week, loose watery stools  Constipation: no  Melena: no  Hematochezia:no  Rectal Bleeding:no  Rectal/Anal Pain:no  Pruritus: no  Family history colon Cancer: no  Previous colon cancer: no  Previous Colon Polyp: yes, 2018 adenoma  Change in bowels: no  Decrease caliber of stool: no  Change in color of stool: no    Previous work up date: 2018 @ [facility] by Dr Bennett with findings of adenoma polyp       Family History   Problem Relation Age of Onset    Breast Cancer Mother         breast , tumor in bowels.   age 84    Heart Disease Father      Social History     Socioeconomic History    Marital status:      Spouse name: Not on file    Number of children: Not on file    Years of education: Not on file    Highest education level: Not on file   Occupational History    Not on file   Tobacco Use    Smoking status: Every Day     Current packs/day: 0.50     Average packs/day: 0.5 packs/day for 40.0 years (20.0 ttl pk-yrs)     Types: Cigarettes    Smokeless tobacco: Never    Tobacco comments:     Will see PCP PRN cessation needs.         1/2 ppd now    Vaping Use    Vaping Use: Never used   Substance and Sexual Activity    Alcohol use: Yes     Alcohol/week: 0.0 standard drinks of alcohol     Comment: social    Drug use: No    Sexual activity: Yes     Partners: Male     Birth control/protection: Post-menopausal   Other Topics Concern    Not on file   Social History Narrative    Not on file     Social Determinants of Health     Financial Resource Strain: Low Risk  (2023)    Overall Financial Resource Strain (CARDIA)

## 2024-07-01 NOTE — TELEPHONE ENCOUNTER
Rita called requesting a refill of the below medication which has been pended for you:     Requested Prescriptions     Pending Prescriptions Disp Refills    PARoxetine (PAXIL) 40 MG tablet [Pharmacy Med Name: PAROXETINE TAB 40MG HCL] 90 tablet 1     Sig: TAKE 1 TABLET DAILY       Last Appointment Date: 2/5/2024  Next Appointment Date: Visit date not found    No Known Allergies

## 2024-07-08 RX ORDER — PAROXETINE HYDROCHLORIDE 40 MG/1
40 TABLET, FILM COATED ORAL DAILY
Qty: 90 TABLET | Refills: 1 | Status: SHIPPED | OUTPATIENT
Start: 2024-07-08

## 2024-07-18 DIAGNOSIS — M54.41 CHRONIC RIGHT-SIDED LOW BACK PAIN WITH RIGHT-SIDED SCIATICA: ICD-10-CM

## 2024-07-18 DIAGNOSIS — M05.751 RHEUMATOID ARTHRITIS INVOLVING RIGHT HIP WITH POSITIVE RHEUMATOID FACTOR (HCC): ICD-10-CM

## 2024-07-18 DIAGNOSIS — M46.1 SACROILIITIS (HCC): ICD-10-CM

## 2024-07-18 DIAGNOSIS — G89.29 CHRONIC RIGHT-SIDED LOW BACK PAIN WITH RIGHT-SIDED SCIATICA: ICD-10-CM

## 2024-07-18 RX ORDER — OXYCODONE HYDROCHLORIDE AND ACETAMINOPHEN 5; 325 MG/1; MG/1
1 TABLET ORAL EVERY 8 HOURS PRN
Qty: 90 TABLET | Refills: 0 | Status: SHIPPED | OUTPATIENT
Start: 2024-07-18 | End: 2024-08-17

## 2024-07-18 NOTE — TELEPHONE ENCOUNTER
Rita called requesting a refill of the below medication which has been pended for you:     Requested Prescriptions     Pending Prescriptions Disp Refills    oxyCODONE-acetaminophen (PERCOCET) 5-325 MG per tablet 90 tablet 0     Sig: Take 1 tablet by mouth every 8 hours as needed for Pain for up to 30 days. Max Daily Amount: 3 tablets       Last Appointment Date: 4/26/2024  Next Appointment Date: 8/9/2024    No Known Allergies    Pharmacy:  [facility]    OARRS Report checked for [place], [place], and [place]:Percocet 5/325 5/31/24 #90. Due NOW.

## 2024-07-29 ENCOUNTER — HOSPITAL ENCOUNTER (OUTPATIENT)
Age: 59
Discharge: HOME | End: 2024-07-29
Payer: COMMERCIAL

## 2024-07-29 DIAGNOSIS — E78.5 HYPERLIPIDEMIA, UNSPECIFIED HYPERLIPIDEMIA TYPE: ICD-10-CM

## 2024-07-29 DIAGNOSIS — K21.9 GASTROESOPHAGEAL REFLUX DISEASE WITHOUT ESOPHAGITIS: ICD-10-CM

## 2024-07-29 LAB
ALBUMIN SERPL-MCNC: 3.9 G/DL (ref 3.5–5.2)
ALBUMIN SERPL-MCNC: 3.9 G/DL (ref 3.5–5.2)
ALBUMIN/GLOB SERPL: 1.1 {RATIO} (ref 1–2.5)
ALBUMIN/GLOB SERPL: 1.1 {RATIO} (ref 1–2.5)
ALP SERPL-CCNC: 135 U/L (ref 35–104)
ALP SERPL-CCNC: 135 U/L (ref 35–104)
ALT SERPL-CCNC: 17 U/L (ref 5–33)
ALT SERPL-CCNC: 17 U/L (ref 5–33)
ANION GAP SERPL CALCULATED.3IONS-SCNC: 9 MMOL/L (ref 9–17)
ANION GAP SERPL CALCULATED.3IONS-SCNC: 9 MMOL/L (ref 9–17)
AST SERPL-CCNC: 19 U/L
AST SERPL-CCNC: 19 U/L
ATYPICAL LYMPHOCYTE ABSOLUTE COUNT: 0.31 K/UL
ATYPICAL LYMPHOCYTES: 5 %
BASOPHILS # BLD: 0.12 K/UL (ref 0–0.2)
BASOPHILS NFR BLD: 2 % (ref 0–1)
BILIRUB SERPL-MCNC: 0.3 MG/DL (ref 0.3–1.2)
BILIRUB SERPL-MCNC: 0.3 MG/DL (ref 0.3–1.2)
BUN SERPL-MCNC: 13 MG/DL (ref 6–20)
BUN SERPL-MCNC: 13 MG/DL (ref 6–20)
BUN/CREAT SERPL: 19 (ref 9–20)
BUN/CREAT SERPL: 19 (ref 9–20)
CALCIUM SERPL-MCNC: 9.1 MG/DL (ref 8.6–10.4)
CALCIUM SERPL-MCNC: 9.1 MG/DL (ref 8.6–10.4)
CHLORIDE SERPL-SCNC: 102 MMOL/L (ref 98–107)
CHLORIDE SERPL-SCNC: 102 MMOL/L (ref 98–107)
CHOLEST SERPL-MCNC: 161 MG/DL (ref 0–199)
CHOLESTEROL/HDL RATIO: 5
CO2 SERPL-SCNC: 26 MMOL/L (ref 20–31)
CO2 SERPL-SCNC: 26 MMOL/L (ref 20–31)
CREAT SERPL-MCNC: 0.7 MG/DL (ref 0.5–0.9)
CREAT SERPL-MCNC: 0.7 MG/DL (ref 0.5–0.9)
EOSINOPHIL # BLD: 0.06 K/UL (ref 0–0.4)
EOSINOPHILS RELATIVE PERCENT: 1 % (ref 1–7)
ERYTHROCYTE [DISTWIDTH] IN BLOOD BY AUTOMATED COUNT: 14.5 % (ref 11.8–14.4)
GFR, ESTIMATED: >90 ML/MIN/1.73M2
GFR, ESTIMATED: >90 ML/MIN/1.73M2
GLUCOSE SERPL-MCNC: 101 MG/DL (ref 70–99)
GLUCOSE SERPL-MCNC: 101 MG/DL (ref 70–99)
HCT VFR BLD AUTO: 40.5 % (ref 36.3–47.1)
HDLC SERPL-MCNC: 34 MG/DL
HGB BLD-MCNC: 13.2 G/DL (ref 11.9–15.1)
IMM GRANULOCYTES # BLD AUTO: 0 K/UL (ref 0–0.3)
IMM GRANULOCYTES NFR BLD: 0 %
LDLC SERPL CALC-MCNC: 76 MG/DL (ref 0–100)
LYMPHOCYTES NFR BLD: 4.15 K/UL (ref 1–4.8)
LYMPHOCYTES RELATIVE PERCENT: 68 % (ref 16–46)
MCH RBC QN AUTO: 30.6 PG (ref 25.2–33.5)
MCHC RBC AUTO-ENTMCNC: 32.6 G/DL (ref 25.2–33.5)
MCV RBC AUTO: 93.8 FL (ref 82.6–102.9)
MONOCYTES NFR BLD: 0.24 K/UL (ref 0.1–1.2)
MONOCYTES NFR BLD: 4 % (ref 4–11)
MORPHOLOGY: ABNORMAL
MORPHOLOGY: ABNORMAL
NEUTROPHILS NFR BLD: 20 % (ref 43–77)
NEUTS SEG NFR BLD: 1.22 K/UL (ref 1.5–8.1)
PLATELET # BLD AUTO: 237 K/UL (ref 138–453)
PMV BLD AUTO: 9.8 FL (ref 8.1–13.5)
POTASSIUM SERPL-SCNC: 4.2 MMOL/L (ref 3.7–5.3)
POTASSIUM SERPL-SCNC: 4.2 MMOL/L (ref 3.7–5.3)
PROT SERPL-MCNC: 7.4 G/DL (ref 6.4–8.3)
PROT SERPL-MCNC: 7.4 G/DL (ref 6.4–8.3)
RBC # BLD AUTO: 4.32 M/UL (ref 3.95–5.11)
SODIUM SERPL-SCNC: 137 MMOL/L (ref 135–144)
SODIUM SERPL-SCNC: 137 MMOL/L (ref 135–144)
TRIGL SERPL-MCNC: 256 MG/DL
VLDLC SERPL CALC-MCNC: 51 MG/DL
WBC OTHER # BLD: 6.1 K/UL (ref 3.5–11.3)

## 2024-07-29 PROCEDURE — 80053 COMPREHEN METABOLIC PANEL: CPT

## 2024-07-29 PROCEDURE — 36415 COLL VENOUS BLD VENIPUNCTURE: CPT

## 2024-07-29 PROCEDURE — 85025 COMPLETE CBC W/AUTO DIFF WBC: CPT

## 2024-07-29 PROCEDURE — 80061 LIPID PANEL: CPT

## 2024-07-29 RX ORDER — PAROXETINE 10 MG/1
TABLET, FILM COATED ORAL
Qty: 90 TABLET | Refills: 1 | Status: SHIPPED | OUTPATIENT
Start: 2024-07-29

## 2024-07-29 NOTE — TELEPHONE ENCOUNTER
Rita called requesting a refill of the below medication which has been pended for you:     Requested Prescriptions     Pending Prescriptions Disp Refills    PARoxetine (PAXIL) 10 MG tablet [Pharmacy Med Name: PAROXETINE TAB 10MG HCL] 90 tablet 1     Sig: TAKE 1 TABLET DAILY       Last Appointment Date: 2/5/2024  Next Appointment Date: 8/13/2024    No Known Allergies

## 2024-08-09 ENCOUNTER — OFFICE VISIT (OUTPATIENT)
Dept: PAIN MANAGEMENT | Age: 59
End: 2024-08-09
Payer: COMMERCIAL

## 2024-08-09 VITALS
HEIGHT: 64 IN | DIASTOLIC BLOOD PRESSURE: 60 MMHG | SYSTOLIC BLOOD PRESSURE: 93 MMHG | BODY MASS INDEX: 24.41 KG/M2 | OXYGEN SATURATION: 96 % | HEART RATE: 78 BPM | WEIGHT: 143 LBS | RESPIRATION RATE: 17 BRPM

## 2024-08-09 DIAGNOSIS — M46.1 SACROILIITIS (HCC): ICD-10-CM

## 2024-08-09 DIAGNOSIS — G89.29 CHRONIC RIGHT-SIDED LOW BACK PAIN WITH RIGHT-SIDED SCIATICA: ICD-10-CM

## 2024-08-09 DIAGNOSIS — M54.41 CHRONIC RIGHT-SIDED LOW BACK PAIN WITH RIGHT-SIDED SCIATICA: ICD-10-CM

## 2024-08-09 DIAGNOSIS — M05.751 RHEUMATOID ARTHRITIS INVOLVING RIGHT HIP WITH POSITIVE RHEUMATOID FACTOR (HCC): ICD-10-CM

## 2024-08-09 DIAGNOSIS — M53.3 SACROILIAC PAIN: Primary | ICD-10-CM

## 2024-08-09 PROCEDURE — 99214 OFFICE O/P EST MOD 30 MIN: CPT | Performed by: NURSE PRACTITIONER

## 2024-08-09 RX ORDER — POTASSIUM CHLORIDE 750 MG/1
10 TABLET, FILM COATED, EXTENDED RELEASE ORAL
COMMUNITY
Start: 2024-07-15 | End: 2025-06-16

## 2024-08-09 RX ORDER — FUROSEMIDE 20 MG/1
TABLET ORAL
COMMUNITY
Start: 2024-07-15

## 2024-08-09 RX ORDER — MEDROXYPROGESTERONE ACETATE 150 MG/ML
INJECTION, SUSPENSION INTRAMUSCULAR
COMMUNITY
Start: 2024-06-13

## 2024-08-09 RX ORDER — OXYCODONE HYDROCHLORIDE AND ACETAMINOPHEN 5; 325 MG/1; MG/1
1 TABLET ORAL EVERY 8 HOURS PRN
Qty: 90 TABLET | Refills: 0 | Status: SHIPPED | OUTPATIENT
Start: 2024-08-09 | End: 2024-09-08

## 2024-08-09 ASSESSMENT — ENCOUNTER SYMPTOMS
GASTROINTESTINAL NEGATIVE: 1
BACK PAIN: 1
RESPIRATORY NEGATIVE: 1
SORE THROAT: 0

## 2024-08-09 NOTE — PROGRESS NOTES
[facility] Pain Management  [address] [place]. [address]    Patient Name: Rita Manjarrez  MRN: [id number]  Encounter Date: 8/9/2024     SUBJECTIVE:  Rita Manjarrez is a 59 y.o., female being seen today regarding   Chief Complaint   Patient presents with    Back Pain     lower    and routine medication management.    Functionality Assessment & Goals:  On a scale of 0 (Does not Interfere) to 10 (Completely Interferes)  Which number describes how during the past week pain has interfered with the following:   A.  General Activity: 8    B.  Mood:  6   C.  Walking Ability:  8   D.  Normal Work (Includes both work outside the home and housework):  8   E.  Relations with Other People:  0   F.  Sleep:  8    G.  Enjoyment of Life:  8  2.  Patient prefers to Take their Pain Medications:   [] On a regular basis   [x] Only when necessary   [] Does not take pain medications  3.  What are the Patient's Goals/ Expectations for Visiting Pain Management?   [] Learn about my pain   [] Physical Therapy   [] Receive Injections   [] Deal with Anxiety and Stress   [x] Receive Medication   [] Treat Depression    [] Treat Sleep   [] Treat Opioid Dependence/ Addiction      Current Pain Assessment:         8/9/2024     1:14 PM   AMB PAIN ASSESSMENT   Location of Pain Back   Location Modifiers Posterior;Medial;Lateral   Severity of Pain 7   Quality of Pain Throbbing;Sharp;Dull;Aching   Duration of Pain Persistent   Frequency of Pain Constant   Aggravating Factors Bending;Stretching;Straightening;Exercise;Kneeling;Squatting;Walking;Standing;Stairs   Limiting Behavior Yes   Relieving Factors Rest;Heat   Result of Injury No   Work-Related Injury No   Are there other pain locations you wish to document? No        Current Medications & Allergies:   Current Outpatient Medications   Medication Instructions    aspirin 81 mg, Oral, DAILY    atorvastatin (LIPITOR) 40 mg, Oral, DAILY    empagliflozin (JARDIANCE) 10 mg, Oral,

## 2024-08-13 ENCOUNTER — OFFICE VISIT (OUTPATIENT)
Dept: FAMILY MEDICINE CLINIC | Age: 59
End: 2024-08-13
Payer: COMMERCIAL

## 2024-08-13 VITALS
OXYGEN SATURATION: 99 % | HEIGHT: 64 IN | WEIGHT: 140.4 LBS | SYSTOLIC BLOOD PRESSURE: 104 MMHG | DIASTOLIC BLOOD PRESSURE: 60 MMHG | HEART RATE: 56 BPM | BODY MASS INDEX: 23.97 KG/M2

## 2024-08-13 DIAGNOSIS — M05.9 RHEUMATOID ARTHRITIS WITH POSITIVE RHEUMATOID FACTOR, INVOLVING UNSPECIFIED SITE (HCC): ICD-10-CM

## 2024-08-13 DIAGNOSIS — D12.6 ADENOMATOUS POLYP OF COLON, UNSPECIFIED PART OF COLON: ICD-10-CM

## 2024-08-13 DIAGNOSIS — K21.9 GASTROESOPHAGEAL REFLUX DISEASE WITHOUT ESOPHAGITIS: ICD-10-CM

## 2024-08-13 DIAGNOSIS — Z12.31 VISIT FOR SCREENING MAMMOGRAM: ICD-10-CM

## 2024-08-13 DIAGNOSIS — G47.33 OSA ON CPAP: ICD-10-CM

## 2024-08-13 DIAGNOSIS — Z72.0 TOBACCO ABUSE: ICD-10-CM

## 2024-08-13 DIAGNOSIS — I87.2 VENOUS INSUFFICIENCY OF BOTH LOWER EXTREMITIES: ICD-10-CM

## 2024-08-13 DIAGNOSIS — Z00.00 ENCOUNTER FOR PREVENTIVE HEALTH EXAMINATION: Primary | ICD-10-CM

## 2024-08-13 DIAGNOSIS — I25.10 CORONARY ARTERY DISEASE INVOLVING NATIVE CORONARY ARTERY OF NATIVE HEART WITHOUT ANGINA PECTORIS: ICD-10-CM

## 2024-08-13 PROCEDURE — 99214 OFFICE O/P EST MOD 30 MIN: CPT | Performed by: FAMILY MEDICINE

## 2024-08-13 RX ORDER — ESOMEPRAZOLE MAGNESIUM 40 MG/1
40 CAPSULE, DELAYED RELEASE ORAL
Qty: 90 CAPSULE | Refills: 1 | Status: SHIPPED | OUTPATIENT
Start: 2024-08-13

## 2024-08-13 SDOH — ECONOMIC STABILITY: INCOME INSECURITY: HOW HARD IS IT FOR YOU TO PAY FOR THE VERY BASICS LIKE FOOD, HOUSING, MEDICAL CARE, AND HEATING?: NOT HARD AT ALL

## 2024-08-13 SDOH — ECONOMIC STABILITY: FOOD INSECURITY: WITHIN THE PAST 12 MONTHS, YOU WORRIED THAT YOUR FOOD WOULD RUN OUT BEFORE YOU GOT MONEY TO BUY MORE.: NEVER TRUE

## 2024-08-13 SDOH — ECONOMIC STABILITY: FOOD INSECURITY: WITHIN THE PAST 12 MONTHS, THE FOOD YOU BOUGHT JUST DIDN'T LAST AND YOU DIDN'T HAVE MONEY TO GET MORE.: NEVER TRUE

## 2024-08-13 ASSESSMENT — ENCOUNTER SYMPTOMS
RESPIRATORY NEGATIVE: 1
RHINORRHEA: 0
EYES NEGATIVE: 1
SINUS PRESSURE: 0
VOICE CHANGE: 0
GASTROINTESTINAL NEGATIVE: 1
COUGH: 0
SORE THROAT: 0
BACK PAIN: 1

## 2024-08-13 ASSESSMENT — PATIENT HEALTH QUESTIONNAIRE - PHQ9
SUM OF ALL RESPONSES TO PHQ9 QUESTIONS 1 & 2: 0
1. LITTLE INTEREST OR PLEASURE IN DOING THINGS: NOT AT ALL
SUM OF ALL RESPONSES TO PHQ QUESTIONS 1-9: 0
2. FEELING DOWN, DEPRESSED OR HOPELESS: NOT AT ALL
SUM OF ALL RESPONSES TO PHQ QUESTIONS 1-9: 0

## 2024-08-13 NOTE — PROGRESS NOTES
Subjective:      Patient ID: Rita Manjarrez is a 59 y.o. female.    Hyperlipidemia    Pharyngitis  Associated symptoms include arthralgias (right wrist/mcps, toes/fingers). Pertinent negatives include no congestion, coughing, fatigue, fever or sore throat.   Medication Refill  Associated symptoms include arthralgias (right wrist/mcps, toes/fingers). Pertinent negatives include no congestion, coughing, fatigue, fever or sore throat.   Back Pain  Pertinent negatives include no fever.       Routine follow up on chronic medical conditions,, refills, and review of updated labs.    Her rheumatoid arthritis is ongoing.  She has been on methotrexate and humira.  Not having complete remission in her migratory arthralgias.  Moving through all the joints by report. Currently having more pain and hips, chronically in the toes long term.    She has 5mg prednisone/day through rheumatology, not taking at present.     Compliant with medications at present. Not using prednisone daily.    Some chronic hoarse voice.  Smoker.  Inhaled steroid use.  Saw ent x 2 and had direct visualization of cords with just irritation.  She had some benefit from mycelex troches, giving some indication that fungal etiology may have been going on.  Seems better at present.  Seems normal per patient    No gerd symptoms.     Trying to quit smoking.  Using patches.  Still trying to quit absolutely.        Past Medical History:   Diagnosis Date    Acute on chronic systolic congestive heart failure (HCC) 05/08/2023    Fibroid uterus     removed with laparoscopy     GERD (gastroesophageal reflux disease)     HLD (hyperlipidemia) 05/08/2023    Hyperlipidemia     ОЛЬГА on CPAP     Rheumatoid arthritis (HCC)     Tobacco abuse        Past Surgical History:   Procedure Laterality Date    BACK INJECTION Right 09/30/2022    Right SACROILIAC  & Piriformis JOINT INJECTION performed by Sergey Frank MD at [facility] OR    BACK INJECTION Bilateral 8/18/2023    Bilateral

## 2024-08-19 ENCOUNTER — TELEPHONE (OUTPATIENT)
Dept: SURGERY | Age: 59
End: 2024-08-19

## 2024-08-19 NOTE — TELEPHONE ENCOUNTER
Patient called stating she has a colonoscopy on 9/10/2024 with Dr Fleming.  She has some questions about the paperwork she received in the mail.  Call her [phone]

## 2024-09-10 ENCOUNTER — ANESTHESIA EVENT (OUTPATIENT)
Dept: OPERATING ROOM | Age: 59
End: 2024-09-10
Payer: COMMERCIAL

## 2024-09-10 ENCOUNTER — ANESTHESIA (OUTPATIENT)
Dept: OPERATING ROOM | Age: 59
End: 2024-09-10
Payer: COMMERCIAL

## 2024-09-10 ENCOUNTER — HOSPITAL ENCOUNTER (OUTPATIENT)
Age: 59
Setting detail: OUTPATIENT SURGERY
Discharge: HOME | End: 2024-09-10
Attending: SURGERY | Admitting: SURGERY
Payer: COMMERCIAL

## 2024-09-10 VITALS
TEMPERATURE: 97.4 F | HEART RATE: 82 BPM | DIASTOLIC BLOOD PRESSURE: 67 MMHG | BODY MASS INDEX: 24.24 KG/M2 | SYSTOLIC BLOOD PRESSURE: 113 MMHG | OXYGEN SATURATION: 97 % | RESPIRATION RATE: 18 BRPM | HEIGHT: 64 IN | WEIGHT: 142 LBS

## 2024-09-10 DIAGNOSIS — Z86.010 PERSONAL HISTORY OF COLONIC POLYPS: ICD-10-CM

## 2024-09-10 PROCEDURE — 6360000002 HC RX W HCPCS: Performed by: NURSE ANESTHETIST, CERTIFIED REGISTERED

## 2024-09-10 PROCEDURE — 7100000011 HC PHASE II RECOVERY - ADDTL 15 MIN: Performed by: SURGERY

## 2024-09-10 PROCEDURE — 2580000003 HC RX 258: Performed by: SURGERY

## 2024-09-10 PROCEDURE — 88305 TISSUE EXAM BY PATHOLOGIST: CPT

## 2024-09-10 PROCEDURE — 7100000010 HC PHASE II RECOVERY - FIRST 15 MIN: Performed by: SURGERY

## 2024-09-10 PROCEDURE — 3609010400 HC COLONOSCOPY POLYPECTOMY HOT BIOPSY: Performed by: SURGERY

## 2024-09-10 PROCEDURE — 2709999900 HC NON-CHARGEABLE SUPPLY: Performed by: SURGERY

## 2024-09-10 PROCEDURE — 2500000003 HC RX 250 WO HCPCS: Performed by: NURSE ANESTHETIST, CERTIFIED REGISTERED

## 2024-09-10 PROCEDURE — 3700000001 HC ADD 15 MINUTES (ANESTHESIA): Performed by: SURGERY

## 2024-09-10 PROCEDURE — 3700000000 HC ANESTHESIA ATTENDED CARE: Performed by: SURGERY

## 2024-09-10 RX ORDER — ONDANSETRON 2 MG/ML
4 INJECTION INTRAMUSCULAR; INTRAVENOUS ONCE
Status: COMPLETED | OUTPATIENT
Start: 2024-09-10 | End: 2024-09-10

## 2024-09-10 RX ORDER — PROPOFOL 10 MG/ML
INJECTION, EMULSION INTRAVENOUS PRN
Status: DISCONTINUED | OUTPATIENT
Start: 2024-09-10 | End: 2024-09-10 | Stop reason: SDUPTHER

## 2024-09-10 RX ORDER — SODIUM CHLORIDE, SODIUM LACTATE, POTASSIUM CHLORIDE, CALCIUM CHLORIDE 600; 310; 30; 20 MG/100ML; MG/100ML; MG/100ML; MG/100ML
INJECTION, SOLUTION INTRAVENOUS CONTINUOUS
Status: DISCONTINUED | OUTPATIENT
Start: 2024-09-10 | End: 2024-09-10 | Stop reason: HOSPADM

## 2024-09-10 RX ORDER — LIDOCAINE HYDROCHLORIDE 40 MG/ML
INJECTION, SOLUTION RETROBULBAR PRN
Status: DISCONTINUED | OUTPATIENT
Start: 2024-09-10 | End: 2024-09-10 | Stop reason: SDUPTHER

## 2024-09-10 RX ADMIN — ONDANSETRON 4 MG: 2 INJECTION INTRAMUSCULAR; INTRAVENOUS at 11:24

## 2024-09-10 RX ADMIN — PROPOFOL 100 MG: 10 INJECTION, EMULSION INTRAVENOUS at 11:49

## 2024-09-10 RX ADMIN — LIDOCAINE HYDROCHLORIDE 40 MG: 40 INJECTION, SOLUTION RETROBULBAR; TOPICAL at 11:49

## 2024-09-10 RX ADMIN — PROPOFOL 180 MCG/KG/MIN: 10 INJECTION, EMULSION INTRAVENOUS at 11:51

## 2024-09-10 RX ADMIN — SODIUM CHLORIDE, POTASSIUM CHLORIDE, SODIUM LACTATE AND CALCIUM CHLORIDE: 600; 310; 30; 20 INJECTION, SOLUTION INTRAVENOUS at 11:10

## 2024-09-10 ASSESSMENT — PAIN DESCRIPTION - DESCRIPTORS: DESCRIPTORS: ACHING

## 2024-09-10 ASSESSMENT — PAIN - FUNCTIONAL ASSESSMENT
PAIN_FUNCTIONAL_ASSESSMENT: 0-10
PAIN_FUNCTIONAL_ASSESSMENT: ADULT NONVERBAL PAIN SCALE (NPVS)

## 2024-09-10 ASSESSMENT — PAIN SCALES - GENERAL: PAINLEVEL_OUTOF10: 0

## 2024-09-13 LAB — SURGICAL PATHOLOGY REPORT: NORMAL

## 2024-09-23 ENCOUNTER — TELEPHONE (OUTPATIENT)
Dept: SURGERY | Age: 59
End: 2024-09-23

## 2024-10-04 DIAGNOSIS — M46.1 SACROILIITIS (HCC): ICD-10-CM

## 2024-10-04 DIAGNOSIS — M05.751 RHEUMATOID ARTHRITIS INVOLVING RIGHT HIP WITH POSITIVE RHEUMATOID FACTOR (HCC): ICD-10-CM

## 2024-10-04 DIAGNOSIS — M54.41 CHRONIC RIGHT-SIDED LOW BACK PAIN WITH RIGHT-SIDED SCIATICA: ICD-10-CM

## 2024-10-04 DIAGNOSIS — G89.29 CHRONIC RIGHT-SIDED LOW BACK PAIN WITH RIGHT-SIDED SCIATICA: ICD-10-CM

## 2024-10-04 RX ORDER — OXYCODONE AND ACETAMINOPHEN 5; 325 MG/1; MG/1
1 TABLET ORAL EVERY 8 HOURS PRN
Qty: 90 TABLET | Refills: 0 | Status: SHIPPED | OUTPATIENT
Start: 2024-10-04 | End: 2024-11-03

## 2024-10-04 NOTE — TELEPHONE ENCOUNTER
Rita called requesting a refill of the below medication which has been pended for you:     Requested Prescriptions     Pending Prescriptions Disp Refills    oxyCODONE-acetaminophen (PERCOCET) 5-325 MG per tablet 90 tablet 0     Sig: Take 1 tablet by mouth every 8 hours as needed for Pain for up to 30 days. Max Daily Amount: 3 tablets       Last Appointment Date: 8/9/2024  Next Appointment Date: 10/9/2024    No Known Allergies    [facility]     Last DS11 2/26/24.  Please review.     [place] Report checked for [place], [place], and [place]: percocet 5/325 mg  8/15/24 #90. Due now

## 2024-10-09 ENCOUNTER — HOSPITAL ENCOUNTER (OUTPATIENT)
Age: 59
Setting detail: SPECIMEN
Discharge: HOME | End: 2024-10-09

## 2024-10-09 ENCOUNTER — OFFICE VISIT (OUTPATIENT)
Dept: PAIN MANAGEMENT | Age: 59
End: 2024-10-09
Payer: COMMERCIAL

## 2024-10-09 VITALS
HEIGHT: 64 IN | DIASTOLIC BLOOD PRESSURE: 62 MMHG | HEART RATE: 56 BPM | WEIGHT: 148 LBS | BODY MASS INDEX: 25.27 KG/M2 | RESPIRATION RATE: 18 BRPM | SYSTOLIC BLOOD PRESSURE: 90 MMHG | OXYGEN SATURATION: 97 %

## 2024-10-09 DIAGNOSIS — M54.41 CHRONIC RIGHT-SIDED LOW BACK PAIN WITH RIGHT-SIDED SCIATICA: ICD-10-CM

## 2024-10-09 DIAGNOSIS — Z79.891 ENCOUNTER FOR LONG-TERM OPIATE ANALGESIC USE: ICD-10-CM

## 2024-10-09 DIAGNOSIS — G89.29 CHRONIC RIGHT-SIDED LOW BACK PAIN WITH RIGHT-SIDED SCIATICA: ICD-10-CM

## 2024-10-09 DIAGNOSIS — Z02.83 ENCOUNTER FOR DRUG SCREENING: Primary | ICD-10-CM

## 2024-10-09 DIAGNOSIS — M05.751 RHEUMATOID ARTHRITIS INVOLVING RIGHT HIP WITH POSITIVE RHEUMATOID FACTOR (HCC): ICD-10-CM

## 2024-10-09 DIAGNOSIS — M46.1 SACROILIITIS (HCC): ICD-10-CM

## 2024-10-09 DIAGNOSIS — Z02.83 ENCOUNTER FOR DRUG SCREENING: ICD-10-CM

## 2024-10-09 PROCEDURE — 99214 OFFICE O/P EST MOD 30 MIN: CPT | Performed by: NURSE PRACTITIONER

## 2024-10-09 ASSESSMENT — ENCOUNTER SYMPTOMS
RESPIRATORY NEGATIVE: 1
BACK PAIN: 1
SORE THROAT: 0
GASTROINTESTINAL NEGATIVE: 1

## 2024-10-09 NOTE — PROGRESS NOTES
Pulmonary:      Effort: Pulmonary effort is normal. No tachypnea, bradypnea, accessory muscle usage, prolonged expiration, respiratory distress or retractions. She is not intubated.   Abdominal:      Palpations: Abdomen is soft.   Musculoskeletal:      Lumbar back: Bony tenderness (bilateral PSIS) present.      Right foot: No Charcot foot or foot drop.      Left foot: No Charcot foot or foot drop.   Skin:     General: Skin is warm and dry.      Capillary Refill: Capillary refill takes less than 2 seconds.      Coloration: Skin is not ashen, jaundiced or pale.      Findings: No rash.      Nails: There is no clubbing.   Neurological:      Mental Status: She is alert and oriented to person, place, and time.      GCS: GCS eye subscore is 4. GCS verbal subscore is 5. GCS motor subscore is 6.      Cranial Nerves: No cranial nerve deficit.   Psychiatric:         Attention and Perception: Attention normal.         Mood and Affect: Mood normal.         Speech: Speech normal.         Behavior: Behavior is cooperative.         Cognition and Memory: Cognition normal.         Judgment: Judgment normal.        ASSESSMENT:    ICD-10-CM    1. Encounter for drug screening  Z02.83 Pain Management Drug Screen      2. Encounter for long-term opiate analgesic use  Z79.891 Pain Management Drug Screen      3. Chronic right-sided low back pain with right-sided sciatica  M54.41 Pain Management Drug Screen    G89.29       4. Sacroiliitis (HCC)  M46.1       5. Rheumatoid arthritis involving right hip with positive rheumatoid factor (HCC)  M05.751            I have reviewed the chief complaint and the history of present illness, vitals and all subjective documentation by [facility] clinical staff.    PLAN:  Riat PEPE Manjarrez is here for a recheck of chronic pain and medication management.  Medications:  The current medication plan   [x] is controlling pain and providing improvement with quality of life and function and will continue

## 2024-10-28 ENCOUNTER — HOSPITAL ENCOUNTER (OUTPATIENT)
Dept: CT IMAGING | Age: 59
Discharge: HOME | End: 2024-10-30
Attending: INTERNAL MEDICINE
Payer: COMMERCIAL

## 2024-10-28 DIAGNOSIS — Z87.891 PERSONAL HISTORY OF TOBACCO USE: ICD-10-CM

## 2024-10-28 PROCEDURE — 71271 CT THORAX LUNG CANCER SCR C-: CPT

## 2024-11-18 DIAGNOSIS — M54.41 CHRONIC RIGHT-SIDED LOW BACK PAIN WITH RIGHT-SIDED SCIATICA: ICD-10-CM

## 2024-11-18 DIAGNOSIS — G89.29 CHRONIC RIGHT-SIDED LOW BACK PAIN WITH RIGHT-SIDED SCIATICA: ICD-10-CM

## 2024-11-18 DIAGNOSIS — M46.1 SACROILIITIS (HCC): ICD-10-CM

## 2024-11-18 DIAGNOSIS — M05.751 RHEUMATOID ARTHRITIS INVOLVING RIGHT HIP WITH POSITIVE RHEUMATOID FACTOR (HCC): ICD-10-CM

## 2024-11-18 RX ORDER — OXYCODONE AND ACETAMINOPHEN 5; 325 MG/1; MG/1
1 TABLET ORAL EVERY 8 HOURS PRN
Qty: 90 TABLET | Refills: 0 | Status: SHIPPED | OUTPATIENT
Start: 2024-11-18 | End: 2024-12-18

## 2024-11-18 NOTE — TELEPHONE ENCOUNTER
Rita called requesting a refill of the below medication which has been pended for you:     Requested Prescriptions     Pending Prescriptions Disp Refills    oxyCODONE-acetaminophen (PERCOCET) 5-325 MG per tablet 90 tablet 0     Sig: Take 1 tablet by mouth every 8 hours as needed for Pain for up to 30 days. Max Daily Amount: 3 tablets       Last Appointment Date: 10/9/2024  Next Appointment Date: 12/9/2024    No Known Allergies    Pharmacy:  [facility]    OARRS Report checked for [place], [place], and [place]:Percocet 5/325 10/5/24 #90. Due NOW.

## 2024-12-09 ENCOUNTER — HOSPITAL ENCOUNTER (OUTPATIENT)
Age: 59
Setting detail: SPECIMEN
Discharge: HOME | End: 2024-12-09
Payer: COMMERCIAL

## 2024-12-09 ENCOUNTER — OFFICE VISIT (OUTPATIENT)
Dept: PAIN MANAGEMENT | Age: 59
End: 2024-12-09
Payer: COMMERCIAL

## 2024-12-09 VITALS
WEIGHT: 148 LBS | OXYGEN SATURATION: 98 % | DIASTOLIC BLOOD PRESSURE: 76 MMHG | SYSTOLIC BLOOD PRESSURE: 124 MMHG | HEIGHT: 63 IN | RESPIRATION RATE: 12 BRPM | BODY MASS INDEX: 26.22 KG/M2 | HEART RATE: 72 BPM

## 2024-12-09 DIAGNOSIS — M54.41 CHRONIC RIGHT-SIDED LOW BACK PAIN WITH RIGHT-SIDED SCIATICA: ICD-10-CM

## 2024-12-09 DIAGNOSIS — G89.29 CHRONIC RIGHT-SIDED LOW BACK PAIN WITH RIGHT-SIDED SCIATICA: ICD-10-CM

## 2024-12-09 DIAGNOSIS — M46.1 SACROILIITIS (HCC): ICD-10-CM

## 2024-12-09 DIAGNOSIS — Z02.83 ENCOUNTER FOR DRUG SCREENING: Primary | ICD-10-CM

## 2024-12-09 DIAGNOSIS — Z79.891 ENCOUNTER FOR LONG-TERM OPIATE ANALGESIC USE: ICD-10-CM

## 2024-12-09 DIAGNOSIS — Z02.83 ENCOUNTER FOR DRUG SCREENING: ICD-10-CM

## 2024-12-09 DIAGNOSIS — M05.751 RHEUMATOID ARTHRITIS INVOLVING RIGHT HIP WITH POSITIVE RHEUMATOID FACTOR (HCC): ICD-10-CM

## 2024-12-09 PROCEDURE — G8427 DOCREV CUR MEDS BY ELIG CLIN: HCPCS | Performed by: NURSE PRACTITIONER

## 2024-12-09 PROCEDURE — G0481 DRUG TEST DEF 8-14 CLASSES: HCPCS

## 2024-12-09 PROCEDURE — G8419 CALC BMI OUT NRM PARAM NOF/U: HCPCS | Performed by: NURSE PRACTITIONER

## 2024-12-09 PROCEDURE — 80307 DRUG TEST PRSMV CHEM ANLYZR: CPT

## 2024-12-09 PROCEDURE — 3017F COLORECTAL CA SCREEN DOC REV: CPT | Performed by: NURSE PRACTITIONER

## 2024-12-09 PROCEDURE — G8484 FLU IMMUNIZE NO ADMIN: HCPCS | Performed by: NURSE PRACTITIONER

## 2024-12-09 PROCEDURE — 4004F PT TOBACCO SCREEN RCVD TLK: CPT | Performed by: NURSE PRACTITIONER

## 2024-12-09 PROCEDURE — 99214 OFFICE O/P EST MOD 30 MIN: CPT | Performed by: NURSE PRACTITIONER

## 2024-12-09 RX ORDER — OXYCODONE AND ACETAMINOPHEN 5; 325 MG/1; MG/1
1 TABLET ORAL EVERY 8 HOURS PRN
Qty: 90 TABLET | Refills: 0 | Status: SHIPPED | OUTPATIENT
Start: 2024-12-18 | End: 2025-01-17

## 2024-12-09 ASSESSMENT — ENCOUNTER SYMPTOMS
RESPIRATORY NEGATIVE: 1
GASTROINTESTINAL NEGATIVE: 1
SORE THROAT: 0
BACK PAIN: 1

## 2024-12-09 NOTE — PROGRESS NOTES
BREAKFAST    ENBREL SURECLICK 50 MG/ML SOAJ     ENTRESTO 24-26 MG per tablet 1 tablet, Oral, 2 TIMES DAILY    esomeprazole (NEXIUM) 40 mg, Oral, DAILY BEFORE BREAKFAST    furosemide (LASIX) 20 MG tablet     metoprolol succinate (TOPROL XL) 25 MG extended release tablet No dose, route, or frequency recorded.    oxyCODONE-acetaminophen (PERCOCET) 5-325 MG per tablet 1 tablet, Oral, EVERY 8 HOURS PRN    PARoxetine (PAXIL) 10 MG tablet TAKE 1 TABLET DAILY    PARoxetine (PAXIL) 40 mg, Oral, DAILY    potassium chloride (KLOR-CON) 10 MEQ extended release tablet 10 mEq, Oral, THREE TIMES WEEKLY    ticagrelor (BRILINTA) 90 mg, Oral, 2 TIMES DAILY    tiZANidine (ZANAFLEX) 4 MG tablet No dose, route, or frequency recorded.       No Known Allergies    Review of Systems:   Review of Systems   Constitutional:  Positive for activity change and fatigue.   HENT:  Negative for sore throat.    Respiratory: Negative.     Cardiovascular: Negative.    Gastrointestinal: Negative.    Genitourinary: Negative.    Musculoskeletal:  Positive for arthralgias, back pain, joint swelling and myalgias.   Skin: Negative.    Allergic/Immunologic: Positive for immunocompromised state.   Neurological:  Positive for numbness.   Hematological:  Does not bruise/bleed easily.   Psychiatric/Behavioral:  Positive for sleep disturbance.         OBJECTIVE:  Vitals:    12/09/24 1319   BP: 124/76   Pulse: 72   Resp: 12   SpO2: 98%       PHYSICAL EXAM  Physical Exam  Vitals reviewed.   Constitutional:       General: She is awake. She is not in acute distress.     Appearance: Normal appearance. She is well-developed, well-groomed and normal weight. She is not ill-appearing, toxic-appearing or diaphoretic.      Interventions: She is not intubated.  HENT:      Head: Normocephalic and atraumatic.   Eyes:      General: Lids are normal.   Cardiovascular:      Rate and Rhythm: Normal rate.   Pulmonary:      Effort: Pulmonary effort is normal. No tachypnea, bradypnea,

## 2024-12-13 LAB
6-ACETYLMORPHINE, UR: NOT DETECTED
7-AMINOCLONAZEPAM, URINE: NOT DETECTED
ALPHA-OH-ALPRAZ, URINE: NOT DETECTED
ALPHA-OH-MIDAZOLAM, URINE: NOT DETECTED
ALPRAZOLAM, URINE: NOT DETECTED
AMPHETAMINE, URINE: NOT DETECTED
BARBITURATES, URINE: NEGATIVE
BENZOYLECGONINE, UR: NEGATIVE
BUPRENORPHINE URINE: NOT DETECTED
CARISOPRODOL, UR: NEGATIVE
CLONAZEPAM, URINE: NOT DETECTED
CODEINE, URINE: NOT DETECTED
CREAT UR-MCNC: 110 MG/DL (ref 20–400)
DIAZEPAM, URINE: NOT DETECTED
EER PAIN MGT DRUG PANEL, HIGH RES/EMIT U: NORMAL
ETHYL GLUCURONIDE UR: NEGATIVE
FENTANYL URINE: NOT DETECTED
GABAPENTIN: NOT DETECTED
HYDROCODONE, URINE: NOT DETECTED
HYDROMORPHONE, URINE: NOT DETECTED
LORAZEPAM, URINE: NOT DETECTED
MARIJUANA METAB, UR: NORMAL
MDA, URINE: NOT DETECTED
MDEA, EVE, UR: NOT DETECTED
MDMA, URINE: NOT DETECTED
MEPERIDINE METAB, UR: NOT DETECTED
METHADONE, URINE: NEGATIVE
METHAMPHETAMINE, URINE: NOT DETECTED
METHYLPHENIDATE: NOT DETECTED
MIDAZOLAM, URINE: NOT DETECTED
MORPHINE, OPI1M: NOT DETECTED
NALOXONE URINE: NOT DETECTED
NORBUPRENORPHINE, URINE: NOT DETECTED
NORDIAZEPAM, URINE: NOT DETECTED
NORFENTANYL, URINE: NOT DETECTED
NORHYDROCODONE, URINE: NOT DETECTED
NOROXYCODONE, URINE: PRESENT
NOROXYMORPHONE, URINE: NOT DETECTED
OXAZEPAM, URINE: NOT DETECTED
OXYCODONE URINE: PRESENT
OXYMORPHONE, URINE: PRESENT
PAIN MGT DRUG PANEL, HI RES, UR: NORMAL
PCP,URINE: NEGATIVE
PHENTERMINE, UR: NOT DETECTED
PREGABALIN: NOT DETECTED
TAPENTADOL, URINE: NOT DETECTED
TAPENTADOL-O-SULFATE, URINE: NOT DETECTED
TEMAZEPAM, URINE: NOT DETECTED
TRAMADOL, URINE: NEGATIVE
ZOLPIDEM METABOLITE (ZCA), URINE: NOT DETECTED
ZOLPIDEM, URINE: NOT DETECTED

## 2024-12-26 RX ORDER — PAROXETINE 40 MG/1
40 TABLET, FILM COATED ORAL DAILY
Qty: 90 TABLET | Refills: 1 | Status: SHIPPED | OUTPATIENT
Start: 2024-12-26

## 2024-12-26 NOTE — TELEPHONE ENCOUNTER
Rita called requesting a refill of the below medication which has been pended for you:     Requested Prescriptions     Pending Prescriptions Disp Refills    PARoxetine (PAXIL) 40 MG tablet [Pharmacy Med Name: PAROXETINE TAB 40MG HCL] 90 tablet 1     Sig: TAKE 1 TABLET DAILY       Last Appointment Date: 8/13/2024  Next Appointment Date: 2/14/2025    No Known Allergies

## 2025-02-05 DIAGNOSIS — G89.29 CHRONIC RIGHT-SIDED LOW BACK PAIN WITH RIGHT-SIDED SCIATICA: ICD-10-CM

## 2025-02-05 DIAGNOSIS — M54.41 CHRONIC RIGHT-SIDED LOW BACK PAIN WITH RIGHT-SIDED SCIATICA: ICD-10-CM

## 2025-02-05 DIAGNOSIS — M05.751 RHEUMATOID ARTHRITIS INVOLVING RIGHT HIP WITH POSITIVE RHEUMATOID FACTOR (HCC): ICD-10-CM

## 2025-02-05 DIAGNOSIS — M46.1 SACROILIITIS (HCC): ICD-10-CM

## 2025-02-05 RX ORDER — OXYCODONE AND ACETAMINOPHEN 5; 325 MG/1; MG/1
1 TABLET ORAL EVERY 8 HOURS PRN
Qty: 90 TABLET | Refills: 0 | Status: SHIPPED | OUTPATIENT
Start: 2025-02-05 | End: 2025-03-07

## 2025-02-05 NOTE — TELEPHONE ENCOUNTER
Rita called requesting a refill of the below medication which has been pended for you:     Requested Prescriptions     Pending Prescriptions Disp Refills    oxyCODONE-acetaminophen (PERCOCET) 5-325 MG per tablet 90 tablet 0     Sig: Take 1 tablet by mouth every 8 hours as needed for Pain for up to 30 days. Max Daily Amount: 3 tablets       Last Appointment Date: 12/9/2024  Next Appointment Date: 2/7/2025    No Known Allergies    Pharmacy:  [facility]    Last DS11 12/9/24.  Please review.     OARRS Report checked for [place], [place], and [place]:     Percocet 5/325 mg  12/18/24 #90. Due now.

## 2025-02-07 ENCOUNTER — OFFICE VISIT (OUTPATIENT)
Dept: PAIN MANAGEMENT | Age: 60
End: 2025-02-07
Payer: COMMERCIAL

## 2025-02-07 VITALS
RESPIRATION RATE: 12 BRPM | DIASTOLIC BLOOD PRESSURE: 78 MMHG | HEIGHT: 65 IN | SYSTOLIC BLOOD PRESSURE: 102 MMHG | BODY MASS INDEX: 24.99 KG/M2 | WEIGHT: 150 LBS | HEART RATE: 72 BPM | OXYGEN SATURATION: 97 %

## 2025-02-07 DIAGNOSIS — M54.41 CHRONIC RIGHT-SIDED LOW BACK PAIN WITH RIGHT-SIDED SCIATICA: Primary | ICD-10-CM

## 2025-02-07 DIAGNOSIS — G89.29 CHRONIC RIGHT-SIDED LOW BACK PAIN WITH RIGHT-SIDED SCIATICA: Primary | ICD-10-CM

## 2025-02-07 DIAGNOSIS — M06.9 RHEUMATOID ARTHRITIS INVOLVING RIGHT HIP, UNSPECIFIED WHETHER RHEUMATOID FACTOR PRESENT (HCC): ICD-10-CM

## 2025-02-07 DIAGNOSIS — M46.1 SACROILIITIS (HCC): ICD-10-CM

## 2025-02-07 DIAGNOSIS — M53.3 PAIN OF RIGHT SACROILIAC JOINT: ICD-10-CM

## 2025-02-07 DIAGNOSIS — G57.01 PIRIFORMIS SYNDROME OF RIGHT SIDE: ICD-10-CM

## 2025-02-07 PROCEDURE — 99214 OFFICE O/P EST MOD 30 MIN: CPT | Performed by: NURSE PRACTITIONER

## 2025-02-07 ASSESSMENT — ENCOUNTER SYMPTOMS
GASTROINTESTINAL NEGATIVE: 1
RESPIRATORY NEGATIVE: 1
SORE THROAT: 0
BACK PAIN: 1

## 2025-02-07 NOTE — PROGRESS NOTES
[facility]  [address] [place]. [address]    Patient Name: Rita Manjarrez  MRN: [id number]  Encounter Date: 2/7/2025     SUBJECTIVE:  Rita Manjarrez is a 59 y.o., female being seen today regarding   Chief Complaint   Patient presents with    Hip Pain     Left hip    and routine medication management.    Functionality Assessment & Goals:  On a scale of 0 (Does not Interfere) to 10 (Completely Interferes)  Which number describes how during the past week pain has interfered with the following:   A.  General Activity: 8    B.  Mood:  6   C.  Walking Ability:  8   D.  Normal Work (Includes both work outside the home and housework):  8   E.  Relations with Other People:  0   F.  Sleep:  8    G.  Enjoyment of Life:  8  2.  Patient prefers to Take their Pain Medications:   [] On a regular basis   [x] Only when necessary   [] Does not take pain medications  3.  What are the Patient's Goals/ Expectations for Visiting Pain Management?   [] Learn about my pain   [] Physical Therapy   [] Receive Injections   [] Deal with Anxiety and Stress   [x] Receive Medication   [] Treat Depression    [] Treat Sleep   [] Treat Opioid Dependence/ Addiction      Current Pain Assessment:         2/7/2025     1:05 PM   AMB PAIN ASSESSMENT   Location of Pain Hip   Location Modifiers Right   Severity of Pain 8   Quality of Pain Throbbing;Sharp;Dull;Aching   Duration of Pain Persistent   Frequency of Pain Constant   Aggravating Factors Stretching;Bending;Straightening;Exercise;Kneeling;Squatting;Standing;Walking;Stairs   Limiting Behavior Yes   Relieving Factors Rest;Heat   Result of Injury No   Work-Related Injury No   Are there other pain locations you wish to document? No        Current Medications & Allergies:   Current Outpatient Medications   Medication Instructions    aspirin 81 mg, Oral, DAILY    atorvastatin (LIPITOR) 40 mg, Oral, DAILY    empagliflozin (JARDIANCE) 10 mg, Oral, DAILY WITH

## 2025-02-14 ENCOUNTER — OFFICE VISIT (OUTPATIENT)
Dept: FAMILY MEDICINE CLINIC | Age: 60
End: 2025-02-14

## 2025-02-14 VITALS
OXYGEN SATURATION: 96 % | WEIGHT: 153 LBS | DIASTOLIC BLOOD PRESSURE: 62 MMHG | HEART RATE: 75 BPM | HEIGHT: 64 IN | SYSTOLIC BLOOD PRESSURE: 112 MMHG | BODY MASS INDEX: 26.12 KG/M2

## 2025-02-14 DIAGNOSIS — I87.2 VENOUS INSUFFICIENCY OF BOTH LOWER EXTREMITIES: ICD-10-CM

## 2025-02-14 DIAGNOSIS — D12.6 ADENOMATOUS POLYP OF COLON, UNSPECIFIED PART OF COLON: ICD-10-CM

## 2025-02-14 DIAGNOSIS — K21.9 GASTROESOPHAGEAL REFLUX DISEASE WITHOUT ESOPHAGITIS: ICD-10-CM

## 2025-02-14 DIAGNOSIS — E78.5 HYPERLIPIDEMIA, UNSPECIFIED HYPERLIPIDEMIA TYPE: ICD-10-CM

## 2025-02-14 DIAGNOSIS — Z72.0 TOBACCO ABUSE: ICD-10-CM

## 2025-02-14 DIAGNOSIS — Z00.00 ENCOUNTER FOR PREVENTIVE HEALTH EXAMINATION: Primary | ICD-10-CM

## 2025-02-14 DIAGNOSIS — I25.10 CORONARY ARTERY DISEASE INVOLVING NATIVE CORONARY ARTERY OF NATIVE HEART WITHOUT ANGINA PECTORIS: ICD-10-CM

## 2025-02-14 DIAGNOSIS — G47.33 OSA ON CPAP: ICD-10-CM

## 2025-02-14 DIAGNOSIS — F34.1 PERSISTENT DEPRESSIVE DISORDER: ICD-10-CM

## 2025-02-14 DIAGNOSIS — M05.9 RHEUMATOID ARTHRITIS WITH POSITIVE RHEUMATOID FACTOR, INVOLVING UNSPECIFIED SITE (HCC): ICD-10-CM

## 2025-02-14 RX ORDER — PAROXETINE 10 MG/1
TABLET, FILM COATED ORAL
Qty: 90 TABLET | Refills: 1 | Status: SHIPPED | OUTPATIENT
Start: 2025-02-14

## 2025-02-14 RX ORDER — ESOMEPRAZOLE MAGNESIUM 40 MG/1
40 CAPSULE, DELAYED RELEASE ORAL
Qty: 90 CAPSULE | Refills: 1 | Status: SHIPPED | OUTPATIENT
Start: 2025-02-14

## 2025-02-14 SDOH — ECONOMIC STABILITY: FOOD INSECURITY: WITHIN THE PAST 12 MONTHS, YOU WORRIED THAT YOUR FOOD WOULD RUN OUT BEFORE YOU GOT MONEY TO BUY MORE.: NEVER TRUE

## 2025-02-14 SDOH — ECONOMIC STABILITY: FOOD INSECURITY: WITHIN THE PAST 12 MONTHS, THE FOOD YOU BOUGHT JUST DIDN'T LAST AND YOU DIDN'T HAVE MONEY TO GET MORE.: NEVER TRUE

## 2025-02-14 ASSESSMENT — ENCOUNTER SYMPTOMS
EYES NEGATIVE: 1
RHINORRHEA: 0
BACK PAIN: 1
SORE THROAT: 0
COUGH: 0
RESPIRATORY NEGATIVE: 1
GASTROINTESTINAL NEGATIVE: 1
SINUS PRESSURE: 0
VOICE CHANGE: 0

## 2025-02-14 ASSESSMENT — PATIENT HEALTH QUESTIONNAIRE - PHQ9
SUM OF ALL RESPONSES TO PHQ QUESTIONS 1-9: 0
SUM OF ALL RESPONSES TO PHQ9 QUESTIONS 1 & 2: 0
1. LITTLE INTEREST OR PLEASURE IN DOING THINGS: NOT AT ALL
SUM OF ALL RESPONSES TO PHQ QUESTIONS 1-9: 0
2. FEELING DOWN, DEPRESSED OR HOPELESS: NOT AT ALL

## 2025-02-14 NOTE — PROGRESS NOTES
Subjective:      Patient ID: Rita Manjarrez is a 59 y.o. female.    Hyperlipidemia    Pharyngitis  Associated symptoms include arthralgias (right wrist/mcps, toes/fingers). Pertinent negatives include no congestion, coughing, fatigue, fever or sore throat.   Medication Refill  Associated symptoms include arthralgias (right wrist/mcps, toes/fingers). Pertinent negatives include no congestion, coughing, fatigue, fever or sore throat.   Back Pain  Pertinent negatives include no fever.       Routine follow up on chronic medical conditions,, refills, and review of updated labs.    Her rheumatoid arthritis is ongoing.  She has been on methotrexate and humira.  Not having complete remission in her migratory arthralgias.  Moving through all the joints by report. Currently having more pain and hips, chronically in the toes long term.    She has 5mg prednisone/day through rheumatology, not taking at present.  She reports using prednisone for a few days for flares.  Flares 4-5 x/year.  Usually hands. Inflammatory symptoms by review.    Compliant with medications at present. Not using prednisone daily.    Some chronic hoarse voice.  Smoker.  Inhaled steroid use.  Saw ent x 2 and had direct visualization of cords with just irritation.  She had some benefit from mycelex troches, giving some indication that fungal etiology may have been going on.  Seems better at present.  Seems normal per patient    No gerd symptoms.     Trying to quit smoking.  Using patches.  Still trying to quit absolutely.  Stuck at 1/2ppd.        Past Medical History:   Diagnosis Date    Acute on chronic systolic congestive heart failure (HCC) 05/08/2023    Fibroid uterus     removed with laparoscopy     GERD (gastroesophageal reflux disease)     HLD (hyperlipidemia) 05/08/2023    Hyperlipidemia     ОЛЬГА on CPAP     Rheumatoid arthritis (HCC)     Tobacco abuse        Past Surgical History:   Procedure Laterality Date    BACK INJECTION Right 09/30/2022

## 2025-03-27 DIAGNOSIS — M05.751 RHEUMATOID ARTHRITIS INVOLVING RIGHT HIP WITH POSITIVE RHEUMATOID FACTOR (HCC): ICD-10-CM

## 2025-03-27 DIAGNOSIS — M46.1 SACROILIITIS: ICD-10-CM

## 2025-03-27 DIAGNOSIS — G89.29 CHRONIC RIGHT-SIDED LOW BACK PAIN WITH RIGHT-SIDED SCIATICA: ICD-10-CM

## 2025-03-27 DIAGNOSIS — M54.41 CHRONIC RIGHT-SIDED LOW BACK PAIN WITH RIGHT-SIDED SCIATICA: ICD-10-CM

## 2025-03-27 RX ORDER — OXYCODONE AND ACETAMINOPHEN 5; 325 MG/1; MG/1
1 TABLET ORAL EVERY 8 HOURS PRN
Qty: 90 TABLET | Refills: 0 | Status: SHIPPED | OUTPATIENT
Start: 2025-04-07 | End: 2025-05-07

## 2025-03-27 NOTE — TELEPHONE ENCOUNTER
Rita called requesting a refill of the below medication which has been pended for you:     Requested Prescriptions     Pending Prescriptions Disp Refills    oxyCODONE-acetaminophen (PERCOCET) 5-325 MG per tablet 90 tablet 0     Sig: Take 1 tablet by mouth every 8 hours as needed for Pain for up to 30 days. Max Daily Amount: 3 tablets       Last Appointment Date: 2/7/2025  Next Appointment Date: 4/7/2025    No Known Allergies    Pharmacy:  [facility]     Last DS11 12/9/24.  Please review.     OAR[place] Report checked for [place], [place], and [place]:     Percocet 5/325 mg  2/5/25  #90. Due 3/7/25.

## 2025-04-07 ENCOUNTER — TELEMEDICINE (OUTPATIENT)
Dept: PAIN MANAGEMENT | Age: 60
End: 2025-04-07
Payer: COMMERCIAL

## 2025-04-07 DIAGNOSIS — G89.29 CHRONIC RIGHT-SIDED LOW BACK PAIN WITH RIGHT-SIDED SCIATICA: Primary | ICD-10-CM

## 2025-04-07 DIAGNOSIS — M54.41 CHRONIC RIGHT-SIDED LOW BACK PAIN WITH RIGHT-SIDED SCIATICA: Primary | ICD-10-CM

## 2025-04-07 DIAGNOSIS — M53.3 SACROILIAC PAIN: ICD-10-CM

## 2025-04-07 DIAGNOSIS — M46.1 SACROILIITIS: ICD-10-CM

## 2025-04-07 DIAGNOSIS — G57.01 PIRIFORMIS SYNDROME OF RIGHT SIDE: ICD-10-CM

## 2025-04-07 DIAGNOSIS — M05.751 RHEUMATOID ARTHRITIS INVOLVING RIGHT HIP WITH POSITIVE RHEUMATOID FACTOR (HCC): ICD-10-CM

## 2025-04-07 DIAGNOSIS — M53.3 PAIN OF RIGHT SACROILIAC JOINT: ICD-10-CM

## 2025-04-07 PROCEDURE — 3017F COLORECTAL CA SCREEN DOC REV: CPT | Performed by: NURSE PRACTITIONER

## 2025-04-07 PROCEDURE — G8427 DOCREV CUR MEDS BY ELIG CLIN: HCPCS | Performed by: NURSE PRACTITIONER

## 2025-04-07 PROCEDURE — 99212 OFFICE O/P EST SF 10 MIN: CPT | Performed by: NURSE PRACTITIONER

## 2025-04-07 PROCEDURE — 99214 OFFICE O/P EST MOD 30 MIN: CPT | Performed by: NURSE PRACTITIONER

## 2025-04-11 ASSESSMENT — ENCOUNTER SYMPTOMS
RESPIRATORY NEGATIVE: 1
BACK PAIN: 1
SORE THROAT: 0
GASTROINTESTINAL NEGATIVE: 1

## 2025-04-11 NOTE — PROGRESS NOTES
[x] No significant exanthematous lesions or discoloration noted on facial skin         [] Abnormal -            Psychiatric:       [x] Normal Affect [] Abnormal -        [x] No Hallucinations    Other pertinent observable physical exam findings:-             --PARAS Ventura - CNP

## 2025-04-11 NOTE — ASSESSMENT & PLAN NOTE
Chronic pain diagnoses such as   1. Chronic right-sided low back pain with right-sided sciatica    2. Rheumatoid arthritis involving right hip with positive rheumatoid factor (HCC)    3. Sacroiliitis    4. Sacroiliac pain    5. Pain of right sacroiliac joint    6. Piriformis syndrome of right side     controlled on current medication regime, wll continue current pain medications to improve quality of life and function.   Controlled Substance Monitoring:    Acute and Chronic Pain Monitoring:   RX Monitoring Periodic Controlled Substance Monitoring   4/7/2025  12:16 PM No signs of potential drug abuse or diversion identified.;Obtaining appropriate analgesic effect of treatment.

## 2025-05-23 DIAGNOSIS — G89.29 CHRONIC RIGHT-SIDED LOW BACK PAIN WITH RIGHT-SIDED SCIATICA: ICD-10-CM

## 2025-05-23 DIAGNOSIS — M05.751 RHEUMATOID ARTHRITIS INVOLVING RIGHT HIP WITH POSITIVE RHEUMATOID FACTOR (HCC): ICD-10-CM

## 2025-05-23 DIAGNOSIS — M54.41 CHRONIC RIGHT-SIDED LOW BACK PAIN WITH RIGHT-SIDED SCIATICA: ICD-10-CM

## 2025-05-23 DIAGNOSIS — M46.1 SACROILIITIS: ICD-10-CM

## 2025-05-23 RX ORDER — OXYCODONE AND ACETAMINOPHEN 5; 325 MG/1; MG/1
1 TABLET ORAL EVERY 8 HOURS PRN
Qty: 90 TABLET | Refills: 0 | Status: SHIPPED | OUTPATIENT
Start: 2025-05-23 | End: 2025-06-22

## 2025-05-23 NOTE — TELEPHONE ENCOUNTER
Percocet 5-325 mg  DS11 12/9/24  Last filled 4/7/25  Due NOW  Last Appt:  4/7/2025  Next Appt:   6/4/2025  Med verified in Epic

## 2025-06-04 ENCOUNTER — OFFICE VISIT (OUTPATIENT)
Dept: PAIN MANAGEMENT | Age: 60
End: 2025-06-04
Payer: COMMERCIAL

## 2025-06-04 VITALS
BODY MASS INDEX: 25.64 KG/M2 | DIASTOLIC BLOOD PRESSURE: 54 MMHG | OXYGEN SATURATION: 96 % | HEART RATE: 78 BPM | WEIGHT: 150.2 LBS | HEIGHT: 64 IN | SYSTOLIC BLOOD PRESSURE: 102 MMHG | TEMPERATURE: 98.3 F

## 2025-06-04 DIAGNOSIS — M46.1 SACROILIITIS: ICD-10-CM

## 2025-06-04 DIAGNOSIS — G57.01 PIRIFORMIS SYNDROME OF RIGHT SIDE: ICD-10-CM

## 2025-06-04 DIAGNOSIS — M53.3 PAIN OF RIGHT SACROILIAC JOINT: ICD-10-CM

## 2025-06-04 DIAGNOSIS — Z79.891 CHRONIC PRESCRIPTION OPIATE USE: ICD-10-CM

## 2025-06-04 DIAGNOSIS — R52 PAIN MANAGEMENT: ICD-10-CM

## 2025-06-04 DIAGNOSIS — M54.41 CHRONIC RIGHT-SIDED LOW BACK PAIN WITH RIGHT-SIDED SCIATICA: Primary | ICD-10-CM

## 2025-06-04 DIAGNOSIS — G89.29 CHRONIC RIGHT-SIDED LOW BACK PAIN WITH RIGHT-SIDED SCIATICA: Primary | ICD-10-CM

## 2025-06-04 PROCEDURE — 99215 OFFICE O/P EST HI 40 MIN: CPT | Performed by: NURSE PRACTITIONER

## 2025-06-04 ASSESSMENT — ENCOUNTER SYMPTOMS
RESPIRATORY NEGATIVE: 1
SORE THROAT: 0
BACK PAIN: 1
GASTROINTESTINAL NEGATIVE: 1

## 2025-06-04 NOTE — PROGRESS NOTES
[facility]  [address] [place]. [address]    Patient Name: Rita Manjarrez  MRN: [id number]  Encounter Date: 6/4/2025     SUBJECTIVE:  Rita Manjarrez is a 60 y.o., female being seen today regarding   Chief Complaint   Patient presents with    Back Pain    and routine medication management.    History of Present Illness  The patient is a 60-year-old female who presents for evaluation of pain.    She reports that her current medication regimen has been effective in managing her pain. However, she is experiencing significant discomfort due to arthritis. She expresses concern about the potential impact on her daily activities if she were to discontinue the medication. She confirms that she has not been exceeding the prescribed dosage.      Functionality Assessment & Goals:  On a scale of 0 (Does not Interfere) to 10 (Completely Interferes)  Which number describes how during the past week pain has interfered with the following:   A.  General Activity: 8    B.  Mood:  6   C.  Walking Ability:  8   D.  Normal Work (Includes both work outside the home and housework):  8   E.  Relations with Other People:  0   F.  Sleep:  8    G.  Enjoyment of Life:  8  2.  Patient prefers to Take their Pain Medications:   [] On a regular basis   [x] Only when necessary   [] Does not take pain medications  3.  What are the Patient's Goals/ Expectations for Visiting Pain Management?   [] Learn about my pain   [] Physical Therapy   [] Receive Injections   [] Deal with Anxiety and Stress   [x] Receive Medication   [] Treat Depression    [] Treat Sleep   [] Treat Opioid Dependence/ Addiction      Current Pain Assessment:         6/4/2025     2:22 PM   AMB PAIN ASSESSMENT   Location of Pain Back   Location Modifiers Other (Comment)   Severity of Pain 5   Duration of Pain Persistent   Frequency of Pain Constant   Limiting Behavior Some   Relieving Factors Heat;Rest        Current Medications & Allergies:

## 2025-06-23 NOTE — TELEPHONE ENCOUNTER
Rita called requesting a refill of the below medication which has been pended for you:     Requested Prescriptions     Pending Prescriptions Disp Refills    PARoxetine (PAXIL) 40 MG tablet [Pharmacy Med Name: PAROXETINE TAB 40MG] 90 tablet 1     Sig: TAKE 1 TABLET DAILY       Last Appointment Date: 2/14/2025  Next Appointment Date: 8/14/2025    No Known Allergies

## 2025-06-30 RX ORDER — PAROXETINE 40 MG/1
40 TABLET, FILM COATED ORAL DAILY
Qty: 90 TABLET | Refills: 1 | Status: SHIPPED | OUTPATIENT
Start: 2025-06-30

## 2025-07-07 DIAGNOSIS — M05.751 RHEUMATOID ARTHRITIS INVOLVING RIGHT HIP WITH POSITIVE RHEUMATOID FACTOR (HCC): ICD-10-CM

## 2025-07-07 DIAGNOSIS — M54.41 CHRONIC RIGHT-SIDED LOW BACK PAIN WITH RIGHT-SIDED SCIATICA: ICD-10-CM

## 2025-07-07 DIAGNOSIS — M46.1 SACROILIITIS: ICD-10-CM

## 2025-07-07 DIAGNOSIS — G89.29 CHRONIC RIGHT-SIDED LOW BACK PAIN WITH RIGHT-SIDED SCIATICA: ICD-10-CM

## 2025-07-07 NOTE — TELEPHONE ENCOUNTER
Percocet 5-325 mg  DS11 12/9/24  Last filled 5/23/25  Due NOW  Last Appt:  6/4/2025  Next Appt:   7/16/2025  Med verified in Epic

## 2025-07-08 RX ORDER — OXYCODONE AND ACETAMINOPHEN 5; 325 MG/1; MG/1
1 TABLET ORAL EVERY 8 HOURS PRN
Qty: 90 TABLET | Refills: 0 | Status: SHIPPED | OUTPATIENT
Start: 2025-07-08 | End: 2025-08-07

## 2025-07-15 ENCOUNTER — OFFICE VISIT (OUTPATIENT)
Dept: PRIMARY CARE CLINIC | Age: 60
End: 2025-07-15
Payer: COMMERCIAL

## 2025-07-15 VITALS
BODY MASS INDEX: 25.61 KG/M2 | WEIGHT: 149.2 LBS | OXYGEN SATURATION: 98 % | SYSTOLIC BLOOD PRESSURE: 90 MMHG | HEART RATE: 80 BPM | DIASTOLIC BLOOD PRESSURE: 60 MMHG | TEMPERATURE: 96.3 F

## 2025-07-15 DIAGNOSIS — J06.9 VIRAL URI: Primary | ICD-10-CM

## 2025-07-15 DIAGNOSIS — H69.93 DYSFUNCTION OF BOTH EUSTACHIAN TUBES: ICD-10-CM

## 2025-07-15 PROCEDURE — 99213 OFFICE O/P EST LOW 20 MIN: CPT

## 2025-07-15 RX ORDER — FLUTICASONE PROPIONATE 50 MCG
2 SPRAY, SUSPENSION (ML) NASAL DAILY
Qty: 16 G | Refills: 0 | Status: SHIPPED | OUTPATIENT
Start: 2025-07-15

## 2025-07-15 RX ORDER — PREDNISONE 20 MG/1
40 TABLET ORAL DAILY
Qty: 10 TABLET | Refills: 0 | Status: SHIPPED | OUTPATIENT
Start: 2025-07-15 | End: 2025-07-20

## 2025-07-15 ASSESSMENT — ENCOUNTER SYMPTOMS
WHEEZING: 0
RHINORRHEA: 1
DIARRHEA: 0
VOMITING: 0
CONSTIPATION: 0
ABDOMINAL PAIN: 0
COUGH: 1
NAUSEA: 0
SHORTNESS OF BREATH: 0
SORE THROAT: 1

## 2025-07-15 NOTE — PROGRESS NOTES
[facility] Walk In department of [facility]  [address] [facility] [address]  Phone: [phone]  Fax: [phone]      Rita Manjarrez  1965  MRN: [id number]  Date of visit: 7/15/2025    Chief Complaint:     Rita Manjarrez is here for c/o of Ear Fullness      HPI:     Rita Manjarrez is a 60 y.o. female who presents to the [facility] Care today for her medical conditions/complaints as noted below.    Ear Fullness   There is pain in both ears. This is a new problem. Episode onset: 4 days. The problem occurs constantly. The problem has been gradually worsening. There has been no fever. The patient is experiencing no pain. Associated symptoms include coughing, headaches, rhinorrhea and a sore throat. Pertinent negatives include no abdominal pain, diarrhea, ear discharge, hearing loss, rash or vomiting. Treatments tried: peroxide. The treatment provided no relief.       Past Medical History:   Diagnosis Date    Acute on chronic systolic congestive heart failure (HCC) 05/08/2023    Fibroid uterus     removed with laparoscopy     GERD (gastroesophageal reflux disease)     HLD (hyperlipidemia) 05/08/2023    Hyperlipidemia     ОЛЬГА on CPAP     Rheumatoid arthritis (HCC)     Tobacco abuse         No Known Allergies      Subjective:      Review of Systems   Constitutional:  Negative for appetite change, chills, fatigue and fever.   HENT:  Positive for congestion, rhinorrhea and sore throat. Negative for ear discharge and hearing loss.    Eyes:  Negative for visual disturbance.   Respiratory:  Positive for cough. Negative for shortness of breath and wheezing.    Cardiovascular:  Negative for chest pain and palpitations.   Gastrointestinal:  Negative for abdominal pain, constipation, diarrhea, nausea and vomiting.   Skin:  Negative for rash.   Neurological:  Positive for headaches. Negative for dizziness, seizures, syncope, weakness and numbness.       Objective:     Vitals:

## 2025-07-15 NOTE — PATIENT INSTRUCTIONS
Steroid x 5 days  Discussed taking medication as prescribed in AM with food  Avoid NSAIDs while taking prescription steroid  Flonase spray daily  Patient has signs and symptoms of upper respiratory infection / viral illness.   Antibiotics are not indicated at the present time.  May be treated with over the counter medications. (Sudafed, cold/ sinus)  If high blood pressure may use Corcidin OTC cold medications  Patient can use Tylenol and/or OTC cough syrup.   Advised to follow up with family doctor or return to urgent care if does not get better or symptoms worsen.  Pt can go to ER if high fever >102 , vomiting, breathing difficulty, lethargy.   Patient/ parents understands this approach of home management and agrees with it.

## 2025-07-16 ENCOUNTER — HOSPITAL ENCOUNTER (OUTPATIENT)
Age: 60
Setting detail: SPECIMEN
Discharge: HOME | End: 2025-07-16
Payer: COMMERCIAL

## 2025-07-16 ENCOUNTER — OFFICE VISIT (OUTPATIENT)
Dept: PAIN MANAGEMENT | Age: 60
End: 2025-07-16

## 2025-07-16 VITALS
BODY MASS INDEX: 24.83 KG/M2 | RESPIRATION RATE: 17 BRPM | WEIGHT: 149 LBS | HEIGHT: 65 IN | HEART RATE: 68 BPM | OXYGEN SATURATION: 95 % | SYSTOLIC BLOOD PRESSURE: 100 MMHG | DIASTOLIC BLOOD PRESSURE: 60 MMHG

## 2025-07-16 DIAGNOSIS — G89.29 CHRONIC RIGHT-SIDED LOW BACK PAIN WITH RIGHT-SIDED SCIATICA: ICD-10-CM

## 2025-07-16 DIAGNOSIS — Z02.83 ENCOUNTER FOR DRUG SCREENING: ICD-10-CM

## 2025-07-16 DIAGNOSIS — M54.41 CHRONIC RIGHT-SIDED LOW BACK PAIN WITH RIGHT-SIDED SCIATICA: ICD-10-CM

## 2025-07-16 DIAGNOSIS — Z79.891 ENCOUNTER FOR LONG-TERM OPIATE ANALGESIC USE: ICD-10-CM

## 2025-07-16 DIAGNOSIS — Z02.83 ENCOUNTER FOR DRUG SCREENING: Primary | ICD-10-CM

## 2025-07-16 PROCEDURE — G0481 DRUG TEST DEF 8-14 CLASSES: HCPCS

## 2025-07-16 PROCEDURE — 80307 DRUG TEST PRSMV CHEM ANLYZR: CPT

## 2025-07-16 ASSESSMENT — ENCOUNTER SYMPTOMS
RESPIRATORY NEGATIVE: 1
BACK PAIN: 1
GASTROINTESTINAL NEGATIVE: 1
SORE THROAT: 0

## 2025-07-16 NOTE — PROGRESS NOTES
[facility]  [address] [place]. [address]    Patient Name: Rita Manjarrez  MRN: [id number]  Encounter Date: 7/16/2025     SUBJECTIVE:  Rita Manjarrez is a 60 y.o., female being seen today regarding   Chief Complaint   Patient presents with    Back Pain     lumbar    and routine medication management.    History of Present Illness    The patient is a 60-year-old female who is following up for chronic back pain. She is on Percocet 5 every 8 hours as needed, and the prescription will last her longer than 30 days. Her SOAP score is 12. There is no concern for misuse or addiction, as she has demonstrated no aberrancies. She also has a history of rheumatoid arthritis, which contributes to her chronic pain.    She reports that her back pain is not severe. She has been considering increasing her acetaminophen intake but suspects it may be causing stomach discomfort. She has already refilled her prescription for this month. She mentions that some of her family members are allergic to Tylenol, but she herself has not experienced any adverse reactions.    She went to urgent care on 07/15/2025 due to fluid in her ears and a sore throat. She was told there was no infection, just fluid. She was given a steroid nasal spray and started using it last night.    FAMILY HISTORY  She mentions that some of her family members are allergic to Tylenol.      Functionality Assessment & Goals:  On a scale of 0 (Does not Interfere) to 10 (Completely Interferes)  Which number describes how during the past week pain has interfered with the following:   A.  General Activity: 8    B.  Mood:  6   C.  Walking Ability:  8   D.  Normal Work (Includes both work outside the home and housework):  8   E.  Relations with Other People:  0   F.  Sleep:  8    G.  Enjoyment of Life:  8  2.  Patient prefers to Take their Pain Medications:   [] On a regular basis   [x] Only when necessary   [] Does not take pain

## 2025-07-20 LAB
6-ACETYLMORPHINE, UR: NOT DETECTED
7-AMINOCLONAZEPAM, URINE: NOT DETECTED
ALPHA-OH-ALPRAZ, URINE: NOT DETECTED
ALPHA-OH-MIDAZOLAM, URINE: NOT DETECTED
ALPRAZOLAM, URINE: NOT DETECTED
AMPHETAMINE, URINE: NOT DETECTED
BARBITURATES, URINE: NEGATIVE
BENZOYLECGONINE, UR: NEGATIVE
BUPRENORPHINE URINE: NOT DETECTED
CARISOPRODOL, URINE: NEGATIVE
CLONAZEPAM, URINE: NOT DETECTED
CODEINE, URINE: NOT DETECTED
CREAT UR-MCNC: 71 MG/DL (ref 20–400)
DIAZEPAM, URINE: NOT DETECTED
EER PAIN MGT DRUG PANEL, HIGH RES/EMIT U: ABNORMAL
ETHYL GLUCURONIDE UR: NEGATIVE
FENTANYL URINE: NOT DETECTED
GABAPENTIN: NOT DETECTED
HYDROCODONE, URINE: NOT DETECTED
HYDROMORPHONE, URINE: NOT DETECTED
LORAZEPAM, URINE: NOT DETECTED
MARIJUANA METAB, UR: ABNORMAL
MDA, URINE: NOT DETECTED
MDEA, EVE, UR: NOT DETECTED
MDMA, URINE: NOT DETECTED
MEPERIDINE METAB, UR: NOT DETECTED
METHADONE, URINE: NEGATIVE
METHAMPHETAMINE, URINE: NOT DETECTED
METHYLPHENIDATE: NOT DETECTED
MIDAZOLAM, URINE: NOT DETECTED
MORPHINE, OPI1M: NOT DETECTED
NALOXONE URINE: NOT DETECTED
NORBUPRENORPHINE, URINE: NOT DETECTED
NORDIAZEPAM, URINE: NOT DETECTED
NORFENTANYL, URINE: NOT DETECTED
NORHYDROCODONE, URINE: NOT DETECTED
NOROXYCODONE, URINE: PRESENT
NOROXYMORPHONE, URINE: NOT DETECTED
OXAZEPAM, URINE: NOT DETECTED
OXYCODONE URINE: PRESENT
OXYMORPHONE, URINE: PRESENT
PAIN MGT DRUG PANEL, HI RES, UR: ABNORMAL
PCP,URINE: NEGATIVE
PHENTERMINE, UR: NOT DETECTED
PREGABALIN: NOT DETECTED
TAPENTADOL, URINE: NOT DETECTED
TAPENTADOL-O-SULFATE, URINE: NOT DETECTED
TEMAZEPAM, URINE: NOT DETECTED
TRAMADOL, URINE: NEGATIVE
ZOLPIDEM METABOLITE (ZCA), URINE: NOT DETECTED
ZOLPIDEM, URINE: NOT DETECTED

## 2025-07-23 ENCOUNTER — OFFICE VISIT (OUTPATIENT)
Dept: PRIMARY CARE CLINIC | Age: 60
End: 2025-07-23
Payer: COMMERCIAL

## 2025-07-23 VITALS
HEART RATE: 76 BPM | OXYGEN SATURATION: 96 % | WEIGHT: 149 LBS | HEIGHT: 63 IN | TEMPERATURE: 97.7 F | RESPIRATION RATE: 18 BRPM | SYSTOLIC BLOOD PRESSURE: 120 MMHG | DIASTOLIC BLOOD PRESSURE: 80 MMHG | BODY MASS INDEX: 26.4 KG/M2

## 2025-07-23 DIAGNOSIS — H65.93 BILATERAL NON-SUPPURATIVE OTITIS MEDIA: Primary | ICD-10-CM

## 2025-07-23 PROCEDURE — 99213 OFFICE O/P EST LOW 20 MIN: CPT | Performed by: PHYSICIAN ASSISTANT

## 2025-07-23 RX ORDER — PREDNISONE 20 MG/1
20 TABLET ORAL 2 TIMES DAILY
Qty: 10 TABLET | Refills: 0 | Status: SHIPPED | OUTPATIENT
Start: 2025-07-23 | End: 2025-07-28

## 2025-07-23 ASSESSMENT — ENCOUNTER SYMPTOMS
COUGH: 1
RHINORRHEA: 0
SHORTNESS OF BREATH: 0

## 2025-07-23 NOTE — PROGRESS NOTES
[facility] Walk In department of [facility]  [address] [facility] [address]  Phone: [phone]  Fax: [phone]      Rita Manjarrez  1965  MRN: [id number]  Date of visit: 7/23/2025    Chief Complaint:     Rita Manjarrez is here for c/o of Ear Fullness (Says she was In here last week and a physician gave her a steroid and a nasal spray and she stated they still feel full.)      HPI:     Rita Manjarrez is a 60 y.o. female who presents to the [facility] today for her medical conditions/complaints as noted below.    History of Present Illness  The patient is a 60-year-old female presenting today due to a fullness in her ears. She was seen in the walk-in on 07/15/2025, where she was diagnosed with a viral upper respiratory infection. She was prescribed prednisone and Flonase to help with the fluid behind her ears. She states that this fluid is still present.    She reports that the prescribed steroids and Flonase have not alleviated her symptoms. She continues to experience a sensation of fullness in both ears, with one ear being more affected than the other. This sensation is described as bothersome rather than painful. She also mentions a mild cough and occasional headaches. She has no known allergies to antibiotics. She is not currently taking any allergy medication. She is not experiencing any fever, chills, nasal congestion, runny nose, shortness of breath, chest pain, or dizziness.      MEDICATIONS  Current: prednisone, Flonase    Past Medical History:   Diagnosis Date    Acute on chronic systolic congestive heart failure (HCC) 05/08/2023    Fibroid uterus     removed with laparoscopy     GERD (gastroesophageal reflux disease)     HLD (hyperlipidemia) 05/08/2023    Hyperlipidemia     ОЛЬГА on CPAP     Rheumatoid arthritis (HCC)     Tobacco abuse         No Known Allergies      Subjective:      Review of Systems   Constitutional:  Negative for chills and

## 2025-07-23 NOTE — PATIENT INSTRUCTIONS
Start antibiotics as prescribed  Complete whole course of antibiotics  May use tylenol and ibuprofen for pain/ fever  May use an allergy medication for congestion  If symptoms worsen follow up with PCP or return to walk in clinic  Patient verbalized understanding and agrees with plan of care

## 2025-07-25 ENCOUNTER — TELEPHONE (OUTPATIENT)
Dept: FAMILY MEDICINE CLINIC | Age: 60
End: 2025-07-25

## 2025-07-25 NOTE — TELEPHONE ENCOUNTER
Pt calling in stating she has been to  twice for ear pain and has been on different antibiotics. Her ears are still plugged and she's over all not feeling any better. She does not want to go back to  because it hasn't gotten better and she already made 2 trips there. She is wanting PCP to send something in for her. Advised pt PCP is out of office until Monday. Please advise.

## 2025-08-14 ENCOUNTER — OFFICE VISIT (OUTPATIENT)
Dept: FAMILY MEDICINE CLINIC | Age: 60
End: 2025-08-14
Payer: COMMERCIAL

## 2025-08-14 VITALS
HEART RATE: 56 BPM | HEIGHT: 64 IN | SYSTOLIC BLOOD PRESSURE: 118 MMHG | WEIGHT: 149 LBS | BODY MASS INDEX: 25.44 KG/M2 | OXYGEN SATURATION: 98 % | DIASTOLIC BLOOD PRESSURE: 76 MMHG

## 2025-08-14 DIAGNOSIS — I87.2 VENOUS INSUFFICIENCY OF BOTH LOWER EXTREMITIES: ICD-10-CM

## 2025-08-14 DIAGNOSIS — F34.1 PERSISTENT DEPRESSIVE DISORDER: ICD-10-CM

## 2025-08-14 DIAGNOSIS — K21.9 GASTROESOPHAGEAL REFLUX DISEASE WITHOUT ESOPHAGITIS: ICD-10-CM

## 2025-08-14 DIAGNOSIS — M05.9 RHEUMATOID ARTHRITIS WITH POSITIVE RHEUMATOID FACTOR, INVOLVING UNSPECIFIED SITE (HCC): ICD-10-CM

## 2025-08-14 DIAGNOSIS — Z72.0 TOBACCO ABUSE: ICD-10-CM

## 2025-08-14 DIAGNOSIS — Z00.00 ENCOUNTER FOR PREVENTIVE HEALTH EXAMINATION: ICD-10-CM

## 2025-08-14 DIAGNOSIS — I25.10 CORONARY ARTERY DISEASE INVOLVING NATIVE CORONARY ARTERY OF NATIVE HEART WITHOUT ANGINA PECTORIS: ICD-10-CM

## 2025-08-14 DIAGNOSIS — G47.33 OSA ON CPAP: ICD-10-CM

## 2025-08-14 DIAGNOSIS — E78.5 HYPERLIPIDEMIA, UNSPECIFIED HYPERLIPIDEMIA TYPE: Primary | ICD-10-CM

## 2025-08-14 DIAGNOSIS — D12.6 ADENOMATOUS POLYP OF COLON, UNSPECIFIED PART OF COLON: ICD-10-CM

## 2025-08-14 PROCEDURE — 99214 OFFICE O/P EST MOD 30 MIN: CPT | Performed by: FAMILY MEDICINE

## 2025-08-14 RX ORDER — ESOMEPRAZOLE MAGNESIUM 40 MG/1
40 CAPSULE, DELAYED RELEASE ORAL
Qty: 90 CAPSULE | Refills: 1 | Status: SHIPPED | OUTPATIENT
Start: 2025-08-14

## 2025-08-14 RX ORDER — PAROXETINE 10 MG/1
TABLET, FILM COATED ORAL
Qty: 90 TABLET | Refills: 1 | Status: SHIPPED | OUTPATIENT
Start: 2025-08-14

## 2025-08-14 RX ORDER — PAROXETINE 40 MG/1
40 TABLET, FILM COATED ORAL DAILY
Qty: 90 TABLET | Refills: 1 | Status: SHIPPED | OUTPATIENT
Start: 2025-08-14

## 2025-08-14 ASSESSMENT — ENCOUNTER SYMPTOMS
BACK PAIN: 1
RHINORRHEA: 0
SORE THROAT: 0
SINUS PRESSURE: 0
EYES NEGATIVE: 1
RESPIRATORY NEGATIVE: 1
VOICE CHANGE: 0
COUGH: 0
GASTROINTESTINAL NEGATIVE: 1

## 2025-08-21 ENCOUNTER — HOSPITAL ENCOUNTER (OUTPATIENT)
Dept: LAB | Age: 60
Discharge: HOME | End: 2025-08-21
Payer: MEDICARE

## 2025-08-21 DIAGNOSIS — Z00.00 ENCOUNTER FOR PREVENTIVE HEALTH EXAMINATION: ICD-10-CM

## 2025-08-21 LAB
ALBUMIN SERPL-MCNC: 4.1 G/DL (ref 3.5–5.2)
ALBUMIN/GLOB SERPL: 1.2 {RATIO} (ref 1–2.5)
ALP SERPL-CCNC: 141 U/L (ref 35–104)
ALT SERPL-CCNC: 11 U/L (ref 10–35)
ANION GAP SERPL CALCULATED.3IONS-SCNC: 8 MMOL/L (ref 9–16)
AST SERPL-CCNC: 22 U/L (ref 10–35)
BASOPHILS # BLD: 0 K/UL (ref 0–0.2)
BASOPHILS NFR BLD: 0 % (ref 0–1)
BILIRUB SERPL-MCNC: 0.3 MG/DL (ref 0–1.2)
BUN SERPL-MCNC: 15 MG/DL (ref 6–20)
BUN/CREAT SERPL: 19 (ref 9–20)
CALCIUM SERPL-MCNC: 9.2 MG/DL (ref 8.6–10.4)
CHLORIDE SERPL-SCNC: 106 MMOL/L (ref 98–107)
CHOLEST SERPL-MCNC: 176 MG/DL (ref 0–199)
CHOLESTEROL/HDL RATIO: 5.7
CO2 SERPL-SCNC: 25 MMOL/L (ref 20–31)
CREAT SERPL-MCNC: 0.8 MG/DL (ref 0.6–0.9)
EOSINOPHIL # BLD: 0.29 K/UL (ref 0–0.4)
EOSINOPHILS RELATIVE PERCENT: 6 % (ref 1–7)
ERYTHROCYTE [DISTWIDTH] IN BLOOD BY AUTOMATED COUNT: 13.8 % (ref 11.8–14.4)
EST. AVERAGE GLUCOSE BLD GHB EST-MCNC: 123 MG/DL
GFR, ESTIMATED: 85 ML/MIN/1.73M2
GLUCOSE SERPL-MCNC: 100 MG/DL (ref 74–99)
HBA1C MFR BLD: 5.9 % (ref 4–6)
HCT VFR BLD AUTO: 41.2 % (ref 36.3–47.1)
HDLC SERPL-MCNC: 31 MG/DL
HGB BLD-MCNC: 13.7 G/DL (ref 11.9–15.1)
IMM GRANULOCYTES # BLD AUTO: 0 K/UL (ref 0–0.3)
IMM GRANULOCYTES NFR BLD: 0 %
LDLC SERPL CALC-MCNC: 110 MG/DL (ref 0–100)
LYMPHOCYTES NFR BLD: 3.24 K/UL (ref 1–4.8)
LYMPHOCYTES RELATIVE PERCENT: 66 % (ref 16–46)
MCH RBC QN AUTO: 31.1 PG (ref 25.2–33.5)
MCHC RBC AUTO-ENTMCNC: 33.3 G/DL (ref 25.2–33.5)
MCV RBC AUTO: 93.6 FL (ref 82.6–102.9)
MONOCYTES NFR BLD: 0.83 K/UL (ref 0.1–1.2)
MONOCYTES NFR BLD: 17 % (ref 4–11)
MORPHOLOGY: ABNORMAL
MORPHOLOGY: ABNORMAL
NEUTROPHILS NFR BLD: 11 % (ref 43–77)
NEUTS SEG NFR BLD: 0.54 K/UL (ref 1.5–8.1)
NRBC BLD-RTO: 0 PER 100 WBC
PLATELET # BLD AUTO: 225 K/UL (ref 138–453)
PMV BLD AUTO: 9.5 FL (ref 8.1–13.5)
POTASSIUM SERPL-SCNC: 3.7 MMOL/L (ref 3.7–5.3)
PROT SERPL-MCNC: 7.5 G/DL (ref 6.6–8.7)
RBC # BLD AUTO: 4.4 M/UL (ref 3.95–5.11)
SODIUM SERPL-SCNC: 139 MMOL/L (ref 136–145)
TRIGL SERPL-MCNC: 176 MG/DL
VLDLC SERPL CALC-MCNC: 35 MG/DL (ref 1–30)
WBC OTHER # BLD: 4.9 K/UL (ref 3.5–11.3)

## 2025-08-21 PROCEDURE — 80061 LIPID PANEL: CPT

## 2025-08-21 PROCEDURE — 36415 COLL VENOUS BLD VENIPUNCTURE: CPT

## 2025-08-21 PROCEDURE — 83036 HEMOGLOBIN GLYCOSYLATED A1C: CPT

## 2025-08-21 PROCEDURE — 80053 COMPREHEN METABOLIC PANEL: CPT

## 2025-08-21 PROCEDURE — 85025 COMPLETE CBC W/AUTO DIFF WBC: CPT

## 2025-08-25 ENCOUNTER — HOSPITAL ENCOUNTER (OUTPATIENT)
Dept: LAB | Age: 60
Discharge: HOME | End: 2025-08-25
Payer: MEDICARE

## 2025-08-25 LAB
ANION GAP SERPL CALCULATED.3IONS-SCNC: 11 MMOL/L (ref 9–16)
BNP SERPL-MCNC: 832 PG/ML (ref 0–125)
BUN SERPL-MCNC: 10 MG/DL (ref 6–20)
BUN/CREAT SERPL: 11 (ref 9–20)
CALCIUM SERPL-MCNC: 9.5 MG/DL (ref 8.6–10.4)
CHLORIDE SERPL-SCNC: 107 MMOL/L (ref 98–107)
CHOLEST SERPL-MCNC: 181 MG/DL (ref 0–199)
CHOLESTEROL/HDL RATIO: 6.2
CO2 SERPL-SCNC: 24 MMOL/L (ref 20–31)
CREAT SERPL-MCNC: 0.9 MG/DL (ref 0.6–0.9)
GFR, ESTIMATED: 74 ML/MIN/1.73M2
GLUCOSE SERPL-MCNC: 106 MG/DL (ref 74–99)
HDLC SERPL-MCNC: 29 MG/DL
LDLC SERPL CALC-MCNC: 106 MG/DL (ref 0–100)
POTASSIUM SERPL-SCNC: 3.5 MMOL/L (ref 3.7–5.3)
SODIUM SERPL-SCNC: 142 MMOL/L (ref 136–145)
TRIGL SERPL-MCNC: 232 MG/DL
VLDLC SERPL CALC-MCNC: 46 MG/DL (ref 1–30)

## 2025-08-25 PROCEDURE — 83695 ASSAY OF LIPOPROTEIN(A): CPT

## 2025-08-25 PROCEDURE — 80061 LIPID PANEL: CPT

## 2025-08-25 PROCEDURE — 80048 BASIC METABOLIC PNL TOTAL CA: CPT

## 2025-08-25 PROCEDURE — 83880 ASSAY OF NATRIURETIC PEPTIDE: CPT

## 2025-08-25 PROCEDURE — 36415 COLL VENOUS BLD VENIPUNCTURE: CPT

## 2025-08-26 LAB — LPA SERPL-MCNC: 225 MG/DL

## 2025-08-27 ENCOUNTER — OFFICE VISIT (OUTPATIENT)
Dept: PAIN MANAGEMENT | Age: 60
End: 2025-08-27
Payer: COMMERCIAL

## 2025-08-27 VITALS
DIASTOLIC BLOOD PRESSURE: 62 MMHG | BODY MASS INDEX: 25.06 KG/M2 | OXYGEN SATURATION: 97 % | HEART RATE: 80 BPM | WEIGHT: 146 LBS | SYSTOLIC BLOOD PRESSURE: 98 MMHG

## 2025-08-27 DIAGNOSIS — M53.3 PAIN OF RIGHT SACROILIAC JOINT: ICD-10-CM

## 2025-08-27 DIAGNOSIS — R52 PAIN MANAGEMENT: ICD-10-CM

## 2025-08-27 DIAGNOSIS — G89.29 CHRONIC RIGHT-SIDED LOW BACK PAIN WITH RIGHT-SIDED SCIATICA: Primary | ICD-10-CM

## 2025-08-27 DIAGNOSIS — M54.41 CHRONIC RIGHT-SIDED LOW BACK PAIN WITH RIGHT-SIDED SCIATICA: Primary | ICD-10-CM

## 2025-08-27 DIAGNOSIS — Z79.891 CHRONIC PRESCRIPTION OPIATE USE: ICD-10-CM

## 2025-08-27 DIAGNOSIS — M53.3 SACROILIAC PAIN: ICD-10-CM

## 2025-08-27 DIAGNOSIS — G57.01 PIRIFORMIS SYNDROME OF RIGHT SIDE: ICD-10-CM

## 2025-08-27 PROCEDURE — 99215 OFFICE O/P EST HI 40 MIN: CPT | Performed by: NURSE PRACTITIONER

## 2025-08-27 RX ORDER — OXYCODONE HYDROCHLORIDE 5 MG/1
5 TABLET ORAL EVERY 8 HOURS PRN
Qty: 90 TABLET | Refills: 0 | Status: SHIPPED | OUTPATIENT
Start: 2025-08-27 | End: 2025-09-26

## 2025-08-27 ASSESSMENT — ENCOUNTER SYMPTOMS
GASTROINTESTINAL NEGATIVE: 1
BACK PAIN: 1
SORE THROAT: 0
RESPIRATORY NEGATIVE: 1

## 2025-09-05 DIAGNOSIS — M54.41 CHRONIC RIGHT-SIDED LOW BACK PAIN WITH RIGHT-SIDED SCIATICA: ICD-10-CM

## 2025-09-05 DIAGNOSIS — M53.3 PAIN OF RIGHT SACROILIAC JOINT: ICD-10-CM

## 2025-09-05 DIAGNOSIS — M53.3 SACROILIAC PAIN: ICD-10-CM

## 2025-09-05 DIAGNOSIS — G89.29 CHRONIC RIGHT-SIDED LOW BACK PAIN WITH RIGHT-SIDED SCIATICA: ICD-10-CM

## 2025-09-05 DIAGNOSIS — G57.01 PIRIFORMIS SYNDROME OF RIGHT SIDE: ICD-10-CM

## 2025-09-05 RX ORDER — OXYCODONE HYDROCHLORIDE 5 MG/1
5 TABLET ORAL EVERY 8 HOURS PRN
Qty: 90 TABLET | Refills: 0 | Status: SHIPPED | OUTPATIENT
Start: 2025-09-05 | End: 2025-10-05

## (undated) DEVICE — SYRINGE ANGIO 3ML W/ CLR POLYCARB BRL YEL PLUNG BLK MED

## (undated) DEVICE — NEEDLE, QUINCKE, 22GX5": Brand: MEDLINE

## (undated) DEVICE — NEEDLE FLTR 18GA L1.5IN MEM THK5UM BLNT DISP

## (undated) DEVICE — CO2 CANNULA,SSOFT,ADLT,7O2,4CO2,FEMALE: Brand: MEDLINE

## (undated) DEVICE — BANDAGE ADH DIA7/8IN NAT FLEX-FABRIC WVN FOR WND PROTCT

## (undated) DEVICE — PAD, GROUNDING, UNIVERSAL, SPLIT, 9': Brand: MEDLINE

## (undated) DEVICE — GLOVE SURG SZ 65 THK91MIL LTX FREE SYN POLYISOPRENE

## (undated) DEVICE — 4-PORT MANIFOLD: Brand: NEPTUNE 2

## (undated) DEVICE — GLOVE ORANGE PI 8   MSG9080

## (undated) DEVICE — MERCY DEFIANCE ENDO KIT: Brand: MEDLINE INDUSTRIES, INC.

## (undated) DEVICE — Z DISCONTINUED USE 2741852 LINE SAMP O2 6.5FT W/FEMALE CONN F/ADULT CAPNOLINE PLUS

## (undated) DEVICE — FORCEPS BX L240CM JAW DIA2.2MM RAD JAW 4 HOT DISP

## (undated) DEVICE — FORCEPS BX L240CM JAW DIA2.4MM ORNG L CAP W/ NDL DISP RAD

## (undated) DEVICE — GLOVE SURG SZ 8 L12IN THK91MIL BRN LTX FREE POLYCHLOROPRENE

## (undated) DEVICE — GLOVE ORANGE PI 7   MSG9070

## (undated) DEVICE — TRAY PAIN CUST

## (undated) DEVICE — LINE SAMP O2 6.5FT W/FEMALE CONN F/ADULT CAPNOLINE PLUS

## (undated) DEVICE — CHLORAPREP 26ML CLEAR